# Patient Record
Sex: MALE | URBAN - METROPOLITAN AREA
[De-identification: names, ages, dates, MRNs, and addresses within clinical notes are randomized per-mention and may not be internally consistent; named-entity substitution may affect disease eponyms.]

---

## 2023-07-18 ENCOUNTER — LAB REQUISITION (OUTPATIENT)
Dept: LAB | Facility: CLINIC | Age: 43
End: 2023-07-18

## 2023-07-18 DIAGNOSIS — Z13.1 ENCOUNTER FOR SCREENING FOR DIABETES MELLITUS: ICD-10-CM

## 2023-07-18 DIAGNOSIS — Z13.220 ENCOUNTER FOR SCREENING FOR LIPOID DISORDERS: ICD-10-CM

## 2023-07-18 LAB
ANION GAP SERPL CALCULATED.3IONS-SCNC: 12 MMOL/L (ref 7–15)
BUN SERPL-MCNC: 14.7 MG/DL (ref 6–20)
CALCIUM SERPL-MCNC: 9.7 MG/DL (ref 8.6–10)
CHLORIDE SERPL-SCNC: 104 MMOL/L (ref 98–107)
CHOLEST SERPL-MCNC: 291 MG/DL
CREAT SERPL-MCNC: 0.8 MG/DL (ref 0.67–1.17)
DEPRECATED HCO3 PLAS-SCNC: 25 MMOL/L (ref 22–29)
GFR SERPL CREATININE-BSD FRML MDRD: >90 ML/MIN/1.73M2
GLUCOSE SERPL-MCNC: 86 MG/DL (ref 70–99)
HDLC SERPL-MCNC: 49 MG/DL
LDLC SERPL CALC-MCNC: 209 MG/DL
NONHDLC SERPL-MCNC: 242 MG/DL
POTASSIUM SERPL-SCNC: 4.3 MMOL/L (ref 3.4–5.3)
SODIUM SERPL-SCNC: 141 MMOL/L (ref 136–145)
TRIGL SERPL-MCNC: 165 MG/DL

## 2023-07-18 PROCEDURE — 80048 BASIC METABOLIC PNL TOTAL CA: CPT | Performed by: FAMILY MEDICINE

## 2023-07-18 PROCEDURE — 80061 LIPID PANEL: CPT | Performed by: FAMILY MEDICINE

## 2024-09-23 ENCOUNTER — TRANSFERRED RECORDS (OUTPATIENT)
Dept: HEALTH INFORMATION MANAGEMENT | Facility: CLINIC | Age: 44
End: 2024-09-23

## 2024-09-23 LAB — EJECTION FRACTION: NORMAL %

## 2025-01-06 ENCOUNTER — MEDICAL CORRESPONDENCE (OUTPATIENT)
Dept: HEALTH INFORMATION MANAGEMENT | Facility: CLINIC | Age: 45
End: 2025-01-06
Payer: COMMERCIAL

## 2025-01-06 ENCOUNTER — TRANSFERRED RECORDS (OUTPATIENT)
Dept: HEALTH INFORMATION MANAGEMENT | Facility: CLINIC | Age: 45
End: 2025-01-06
Payer: COMMERCIAL

## 2025-01-09 ENCOUNTER — OFFICE VISIT (OUTPATIENT)
Dept: CARDIOLOGY | Facility: CLINIC | Age: 45
End: 2025-01-09
Payer: COMMERCIAL

## 2025-01-09 VITALS
WEIGHT: 193 LBS | OXYGEN SATURATION: 99 % | HEART RATE: 80 BPM | SYSTOLIC BLOOD PRESSURE: 125 MMHG | DIASTOLIC BLOOD PRESSURE: 82 MMHG

## 2025-01-09 DIAGNOSIS — I34.1 MITRAL VALVE PROLAPSE: Primary | ICD-10-CM

## 2025-01-09 DIAGNOSIS — I34.0 NONRHEUMATIC MITRAL VALVE REGURGITATION: ICD-10-CM

## 2025-01-09 PROCEDURE — 99203 OFFICE O/P NEW LOW 30 MIN: CPT | Performed by: INTERNAL MEDICINE

## 2025-01-09 RX ORDER — DEXTROAMPHETAMINE SACCHARATE, AMPHETAMINE ASPARTATE, DEXTROAMPHETAMINE SULFATE AND AMPHETAMINE SULFATE 3.75; 3.75; 3.75; 3.75 MG/1; MG/1; MG/1; MG/1
0.5 TABLET ORAL
COMMUNITY
Start: 2025-01-06

## 2025-01-09 RX ORDER — DEXTROAMPHETAMINE SACCHARATE, AMPHETAMINE ASPARTATE MONOHYDRATE, DEXTROAMPHETAMINE SULFATE AND AMPHETAMINE SULFATE 2.5; 2.5; 2.5; 2.5 MG/1; MG/1; MG/1; MG/1
10 CAPSULE, EXTENDED RELEASE ORAL EVERY MORNING
COMMUNITY
Start: 2025-01-06

## 2025-01-09 NOTE — PROGRESS NOTES
SUBJECTIVE:  Johnie Buenrostro is a 44 year old male who presents for cardiac evaluation due to a recent echocardiogram showing mitral valve prolapse with moderate to severe mitral regurgitation.  Patient is a  by profession.  According to him his mother told him that he has a cardiac murmur but no evaluation was done till now.  Recently patient's PCP heard a murmur and asked for the echocardiogram.  Currently patient is very active with no cardiac symptoms.  He has no significant comorbidities.  Only other abnormality noticed is normal HDL with over 200 LDL level.  Patient has no other significant cardiac risk factors.  There are no active problems to display for this patient.   .  Current Outpatient Medications   Medication Sig Dispense Refill    amphetamine-dextroamphetamine (ADDERALL XR) 10 MG 24 hr capsule Take 10 mg by mouth every morning.      amphetamine-dextroamphetamine (ADDERALL) 15 MG tablet Take 0.5 tablets by mouth daily at 2 pm.       No current facility-administered medications for this visit.     No past medical history on file.  No past surgical history on file.  Allergies   Allergen Reactions    Penicillins Hives     Social History     Socioeconomic History    Marital status:      Spouse name: Not on file    Number of children: Not on file    Years of education: Not on file    Highest education level: Not on file   Occupational History    Not on file   Tobacco Use    Smoking status: Some Days     Types: Cigars    Smokeless tobacco: Never   Substance and Sexual Activity    Alcohol use: Yes     Alcohol/week: 7.0 standard drinks of alcohol     Types: 7 Standard drinks or equivalent per week    Drug use: Never    Sexual activity: Not on file   Other Topics Concern    Not on file   Social History Narrative    Not on file     Social Drivers of Health     Financial Resource Strain: High Risk (1/1/2022)    Received from [facility]    Financial Resource Strain      Difficulty of Paying Living Expenses: Not on file     Difficulty of Paying Living Expenses: Not on file   Food Insecurity: Not on file   Transportation Needs: Not on file   Physical Activity: Not on file   Stress: Not on file   Social Connections: Unknown (1/1/2022)    Received from [facility] [employer]  & [facility]    Social Connections     Frequency of Communication with Friends and Family: Not on file   Interpersonal Safety: Not on file   Housing Stability: Not on file     No family history on file.       REVIEW OF SYSTEMS:  General: negative, fever, chills, night sweats  Skin: negative, acne, rash, and scaling  Eyes: negative, double vision, eye pain, and photophobia  Ears/Nose/Throat: negative, nasal congestion, and purulent rhinorrhea  Respiratory: No dyspnea on exertion, No cough, No hemoptysis, and negative  Cardiovascular: negative, palpitations, tachycardia, irregular heart beat, chest pain, exertional chest pain or pressure, paroxysmal nocturnal dyspnea, dyspnea on exertion, and orthopnea       OBJECTIVE:  Blood pressure 125/82, pulse 80, weight 87.5 kg (193 lb), SpO2 99%.  General Appearance: healthy, alert, active, and no distress  Head: Normocephalic. No masses, lesions, tenderness or abnormalities  Eyes: conjuctiva clear, PERRL, EOM intact  Ears: External ears normal. Canals clear. TM's normal.  Nose: Nares normal  Mouth: normal  Neck: Supple, no cervical adenopathy, no thyromegaly  Lungs: clear to auscultation  Cardiac: regular rate and rhythm, normal S1 and S2, PSM radiating to axilla.     ASSESSMENT/PLAN:  Patient here for cardiac evaluation due to incidentally detected cardiac murmur and mitral valve prolapse with moderate to severe mitral regurgitation.  Patient is a .  Fairly active with no cardiac symptoms.  To admit to some shortness of breath for past 10 years but it is nonprogressive.  No other symptoms related to valvular heart disease.  According to patient his mother  told him that he has a cardiac murmur during childhood.  No evaluation was done.  Recently PCP heard a murmur and as per the echocardiogram.  This was done at outside hospital and reports suggest moderate to severe mitral regurgitation.  Discussed mitral valve prolapse, implications, indications for intervention and situatios.  They can be a sudden symptom change due to either a ruptured chordae or infection.  As patient is totally asymptomatic we will plan for a ARMANDO.  Patient will be contacted with the test result and further plan.  His LDL is about 200 with normal HDL.  This will be addressed in future.    Total visit duration 30 minutes.  This included face-to-face interview, physical exam, chart review, review of outside echo report and documentation

## 2025-01-09 NOTE — PATIENT INSTRUCTIONS
Thank you for coming to the [facility]; please note the following instructions:    You are scheduled for a Transesophageal Echocardiogram at the [facility] ([address], [facility] [address], [phone]).       Visitor Policy: Two visitors.    Follow these instructions:    1. Report to the GOLD waiting room in the [facility] on: Thursday, January 23rd 10:00 am check in    2. Do not eat for 8 hours prior to arrival, you can drink water up until 2 hours prior.    3. The morning of your procedure you may take your scheduled medications with a SIP of water.     4. You will receive medication that makes you sleepy; you will need a  and someone to stay with you for 6 hours following this procedure.  You should not make any legal decisions for 24 hours following discharge.       If you have further questions, please utilize [employer] to contact us.   If your question concerns the above instructions, contact:  Francheska Trujillo RN  Electrophysiology Nurse Coordinator.  [phone]    If your question concerns the schedule/appointment times, contact:  Celine Sanz  EP   [phone]          If you have any questions regarding your visit please contact your care team:     Cardiology  Telephone Number   Francheska CURRY., RN  Frances TANNER, RN  Lore SHI, RN  Vicky WATSON, RORY MCCAIN, RORY AYOUB, [place]  Chanel TANNER, PRASANTH Barr., [phone] (option 1)   For scheduling appts:     [phone] (select option 1)       For the Device Clinic (Pacemakers and ICD's)  RN's :  Adelaida Malhotra   During business hours: [phone]    *After business hours:  [phone] (select option 4)      Normal test result notifications will be released via [employer] or mailed within 7 business days.  All other test results, will be communicated via telephone once reviewed by your cardiologist.    If you need a medication refill please contact your pharmacy.  Please allow 3 business  days for your refill to be completed.    As always, thank you for trusting us with your health care needs!

## 2025-01-09 NOTE — LETTER
1/9/2025      RE: Johnie Buenrostro  [address] [facility] [address]       Dear Colleague,    Thank you for the opportunity to participate in the care of your patient, Johnie Buenrostro, at the [facility]. Please see a copy of my visit note below.       SUBJECTIVE:  Johnie Buenrostro is a 44 year old male who presents for cardiac evaluation due to a recent echocardiogram showing mitral valve prolapse with moderate to severe mitral regurgitation.  Patient is a  by profession.  According to him his mother told him that he has a cardiac murmur but no evaluation was done till now.  Recently patient's PCP heard a murmur and asked for the echocardiogram.  Currently patient is very active with no cardiac symptoms.  He has no significant comorbidities.  Only other abnormality noticed is normal HDL with over 200 LDL level.  Patient has no other significant cardiac risk factors.  There are no active problems to display for this patient.   .  Current Outpatient Medications   Medication Sig Dispense Refill     amphetamine-dextroamphetamine (ADDERALL XR) 10 MG 24 hr capsule Take 10 mg by mouth every morning.       amphetamine-dextroamphetamine (ADDERALL) 15 MG tablet Take 0.5 tablets by mouth daily at 2 pm.       No current facility-administered medications for this visit.     No past medical history on file.  No past surgical history on file.  Allergies   Allergen Reactions     Penicillins Hives     Social History     Socioeconomic History     Marital status:      Spouse name: Not on file     Number of children: Not on file     Years of education: Not on file     Highest education level: Not on file   Occupational History     Not on file   Tobacco Use     Smoking status: Some Days     Types: Cigars     Smokeless tobacco: Never   Substance and Sexual Activity     Alcohol use: Yes     Alcohol/week: 7.0 standard drinks of alcohol      Types: 7 Standard drinks or equivalent per week     Drug use: Never     Sexual activity: Not on file   Other Topics Concern     Not on file   Social History Narrative     Not on file     Social Drivers of Health     Financial Resource Strain: High Risk (1/1/2022)    Received from [facility]    Financial Resource Strain      Difficulty of Paying Living Expenses: Not on file      Difficulty of Paying Living Expenses: Not on file   Food Insecurity: Not on file   Transportation Needs: Not on file   Physical Activity: Not on file   Stress: Not on file   Social Connections: Unknown (1/1/2022)    Received from [facility]    Social Connections      Frequency of Communication with Friends and Family: Not on file   Interpersonal Safety: Not on file   Housing Stability: Not on file     No family history on file.       REVIEW OF SYSTEMS:  General: negative, fever, chills, night sweats  Skin: negative, acne, rash, and scaling  Eyes: negative, double vision, eye pain, and photophobia  Ears/Nose/Throat: negative, nasal congestion, and purulent rhinorrhea  Respiratory: No dyspnea on exertion, No cough, No hemoptysis, and negative  Cardiovascular: negative, palpitations, tachycardia, irregular heart beat, chest pain, exertional chest pain or pressure, paroxysmal nocturnal dyspnea, dyspnea on exertion, and orthopnea       OBJECTIVE:  Blood pressure 125/82, pulse 80, weight 87.5 kg (193 lb), SpO2 99%.  General Appearance: healthy, alert, active, and no distress  Head: Normocephalic. No masses, lesions, tenderness or abnormalities  Eyes: conjuctiva clear, PERRL, EOM intact  Ears: External ears normal. Canals clear. TM's normal.  Nose: Nares normal  Mouth: normal  Neck: Supple, no cervical adenopathy, no thyromegaly  Lungs: clear to auscultation  Cardiac: regular rate and rhythm, normal S1 and S2, PSM radiating to axilla.     ASSESSMENT/PLAN:  Patient here for cardiac  evaluation due to incidentally detected cardiac murmur and mitral valve prolapse with moderate to severe mitral regurgitation.  Patient is a .  Fairly active with no cardiac symptoms.  To admit to some shortness of breath for past 10 years but it is nonprogressive.  No other symptoms related to valvular heart disease.  According to patient his mother told him that he has a cardiac murmur during childhood.  No evaluation was done.  Recently PCP heard a murmur and as per the echocardiogram.  This was done at outside hospital and reports suggest moderate to severe mitral regurgitation.  Discussed mitral valve prolapse, implications, indications for intervention and situatios.  They can be a sudden symptom change due to either a ruptured chordae or infection.  As patient is totally asymptomatic we will plan for a ARMANDO.  Patient will be contacted with the test result and further plan.  His LDL is about 200 with normal HDL.  This will be addressed in future.    Total visit duration 30 minutes.  This included face-to-face interview, physical exam, chart review, review of outside echo report and documentation      Please do not hesitate to contact me if you have any questions/concerns.     Sincerely,     ROBBY Olguin MD

## 2025-01-19 ENCOUNTER — HEALTH MAINTENANCE LETTER (OUTPATIENT)
Age: 45
End: 2025-01-19

## 2025-01-23 ENCOUNTER — HOSPITAL ENCOUNTER (OUTPATIENT)
Dept: CARDIOLOGY | Facility: CLINIC | Age: 45
End: 2025-01-23
Attending: INTERNAL MEDICINE
Payer: COMMERCIAL

## 2025-01-23 VITALS
DIASTOLIC BLOOD PRESSURE: 80 MMHG | SYSTOLIC BLOOD PRESSURE: 106 MMHG | HEART RATE: 66 BPM | OXYGEN SATURATION: 98 % | RESPIRATION RATE: 11 BRPM

## 2025-01-23 DIAGNOSIS — I34.1 MITRAL VALVE PROLAPSE: ICD-10-CM

## 2025-01-23 LAB — LVEF ECHO: NORMAL

## 2025-01-23 PROCEDURE — 93325 DOPPLER ECHO COLOR FLOW MAPG: CPT

## 2025-01-23 PROCEDURE — 250N000011 HC RX IP 250 OP 636: Performed by: INTERNAL MEDICINE

## 2025-01-23 PROCEDURE — 250N000009 HC RX 250: Performed by: INTERNAL MEDICINE

## 2025-01-23 RX ORDER — LIDOCAINE HYDROCHLORIDE 20 MG/ML
15 SOLUTION OROPHARYNGEAL ONCE
Status: COMPLETED | OUTPATIENT
Start: 2025-01-23 | End: 2025-01-23

## 2025-01-23 RX ORDER — SODIUM CHLORIDE 9 MG/ML
1000 INJECTION, SOLUTION INTRAVENOUS CONTINUOUS
OUTPATIENT
Start: 2025-01-23

## 2025-01-23 RX ORDER — FENTANYL CITRATE 50 UG/ML
50 INJECTION, SOLUTION INTRAMUSCULAR; INTRAVENOUS
Status: ACTIVE | OUTPATIENT
Start: 2025-01-23

## 2025-01-23 RX ORDER — ACYCLOVIR 200 MG/1
9.5 CAPSULE ORAL
Status: ACTIVE | OUTPATIENT
Start: 2025-01-23

## 2025-01-23 RX ORDER — NALOXONE HYDROCHLORIDE 0.4 MG/ML
0.4 INJECTION, SOLUTION INTRAMUSCULAR; INTRAVENOUS; SUBCUTANEOUS
Status: ACTIVE | OUTPATIENT
Start: 2025-01-23 | End: 2025-01-25

## 2025-01-23 RX ORDER — NALOXONE HYDROCHLORIDE 0.4 MG/ML
0.2 INJECTION, SOLUTION INTRAMUSCULAR; INTRAVENOUS; SUBCUTANEOUS
Status: ACTIVE | OUTPATIENT
Start: 2025-01-23 | End: 2025-01-25

## 2025-01-23 RX ORDER — FENTANYL CITRATE 50 UG/ML
25 INJECTION, SOLUTION INTRAMUSCULAR; INTRAVENOUS
Status: ACTIVE | OUTPATIENT
Start: 2025-01-23

## 2025-01-23 RX ORDER — FLUMAZENIL 0.1 MG/ML
0.2 INJECTION, SOLUTION INTRAVENOUS
Status: ACTIVE | OUTPATIENT
Start: 2025-01-23 | End: 2025-01-25

## 2025-01-23 RX ADMIN — MIDAZOLAM 1 MG: 1 INJECTION INTRAMUSCULAR; INTRAVENOUS at 11:09

## 2025-01-23 RX ADMIN — FENTANYL CITRATE 25 MCG: 50 INJECTION, SOLUTION INTRAMUSCULAR; INTRAVENOUS at 10:58

## 2025-01-23 RX ADMIN — MIDAZOLAM 2 MG: 1 INJECTION INTRAMUSCULAR; INTRAVENOUS at 10:58

## 2025-01-23 RX ADMIN — MIDAZOLAM 0.5 MG: 1 INJECTION INTRAMUSCULAR; INTRAVENOUS at 11:06

## 2025-01-23 RX ADMIN — MIDAZOLAM 0.5 MG: 1 INJECTION INTRAMUSCULAR; INTRAVENOUS at 11:20

## 2025-01-23 RX ADMIN — MIDAZOLAM 1 MG: 1 INJECTION INTRAMUSCULAR; INTRAVENOUS at 11:01

## 2025-01-23 RX ADMIN — LIDOCAINE HYDROCHLORIDE 15 ML: 20 SOLUTION ORAL at 10:45

## 2025-01-23 RX ADMIN — MIDAZOLAM 0.5 MG: 1 INJECTION INTRAMUSCULAR; INTRAVENOUS at 11:18

## 2025-01-23 RX ADMIN — FENTANYL CITRATE 25 MCG: 50 INJECTION, SOLUTION INTRAMUSCULAR; INTRAVENOUS at 11:01

## 2025-01-23 RX ADMIN — TOPICAL ANESTHETIC 0.5 ML: 200 SPRAY DENTAL; PERIODONTAL at 10:45

## 2025-01-23 RX ADMIN — MIDAZOLAM 0.5 MG: 1 INJECTION INTRAMUSCULAR; INTRAVENOUS at 11:03

## 2025-01-23 RX ADMIN — FENTANYL CITRATE 25 MCG: 50 INJECTION, SOLUTION INTRAMUSCULAR; INTRAVENOUS at 11:18

## 2025-01-23 RX ADMIN — FENTANYL CITRATE 25 MCG: 50 INJECTION, SOLUTION INTRAMUSCULAR; INTRAVENOUS at 11:06

## 2025-01-23 NOTE — PROGRESS NOTES
Pt arrived in ECHO department for scheduled ARMANDO.   Procedure explained, questions answered and consent signed. Discharge instructions discussed with patient.  Pt's throat sprayed at 1045, therefore pt will not be able to eat or drink until 2 hours after at 1245. Informed pt of this time and encouraged to start with warm fluids and soft foods.    Pt tolerated procedure well, and was given a total of 100 mcg IV fentanyl and 6 mg IV versed for conscious sedation. Pt denied throat or chest pain after ARMANDO complete.   ARMANDO probe 61 used for procedure.  Pt denied chest or throat pain after procedure and was D/C home after awake and VSS. Escorted out to front lobby by staff in w/c to meet pt's ride home.

## 2025-01-23 NOTE — PRE-PROCEDURE
GENERAL PRE-PROCEDURE:     Verbal consent obtained?: Yes    Written consent obtained?: Yes    Risks and benefits: Risks, benefits and alternatives were discussed    DC Plan: Appropriate discharge home plan in place for patients who are going home after procedure   Consent given by:  Patient  Patient states understanding of procedure being performed: Yes    Patient's understanding of procedure matches consent: Yes    Procedure consent matches procedure scheduled: Yes    Appropriately NPO:  Yes  ASA Class:  2  Mallampati  :  Grade 1- soft palate, uvula, tonsillar pillars, and posterior pharyngeal wall visible  Lungs:  Lungs clear with good breath sounds bilaterally  Heart:  Normal heart sounds and rate and systolic murmur  Statement of review:  I have reviewed the lab findings, diagnostic data, medications, and the plan for sedation

## 2025-01-28 ENCOUNTER — TELEPHONE (OUTPATIENT)
Dept: CARDIOLOGY | Facility: CLINIC | Age: 45
End: 2025-01-28
Payer: COMMERCIAL

## 2025-01-28 NOTE — TELEPHONE ENCOUNTER
[facility] Call Center    Phone Message    May a detailed message be left on voicemail: yes     Reason for Call: Other: pt does have some questions about the results of the ARMANDO.  Please call to advise.     Action Taken: Message routed to:  [facility] ([facility] cardio    Travel Screening: Not Applicable    Thank you!  [facility]       Date of Service:

## 2025-01-29 NOTE — TELEPHONE ENCOUNTER
Appointment not needed per Dr. Browne.  Dr. Browne contacted patient via phone and discussed referral to Dr. Maxwell CVTS. Patient is agreeable with the plan.    Francheska Trujillo, RN  Cardiology Care Coordinator  [facility]  [phone] option 1    ROBBY Olguin MD  1/28/2025 12:44 PM CST       Zachariah Cheng,  Here is the result of your transesophageal echocardiogram.  The valve leak appears severe.  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  This is not going to get better by itself.  You will need surgery.  Most likely a minimally invasive surgery which means without opening the whole sternum is possible.  I will be referring to a surgeon who does the surgery and the minimally invasive way.  If you want to discuss further you can come to the clinic.  Thanks FAYE

## 2025-02-06 ENCOUNTER — OFFICE VISIT (OUTPATIENT)
Dept: CARDIOLOGY | Facility: CLINIC | Age: 45
End: 2025-02-06
Attending: SURGERY
Payer: COMMERCIAL

## 2025-02-06 VITALS
WEIGHT: 194.6 LBS | OXYGEN SATURATION: 96 % | SYSTOLIC BLOOD PRESSURE: 128 MMHG | DIASTOLIC BLOOD PRESSURE: 87 MMHG | HEART RATE: 92 BPM

## 2025-02-06 DIAGNOSIS — I34.0 NONRHEUMATIC MITRAL VALVE REGURGITATION: ICD-10-CM

## 2025-02-06 DIAGNOSIS — I34.1 MITRAL VALVE PROLAPSE: Primary | ICD-10-CM

## 2025-02-06 PROCEDURE — 99204 OFFICE O/P NEW MOD 45 MIN: CPT | Performed by: SURGERY

## 2025-02-06 PROCEDURE — 99213 OFFICE O/P EST LOW 20 MIN: CPT | Performed by: SURGERY

## 2025-02-06 RX ORDER — ERGOCALCIFEROL 1.25 MG/1
50000 CAPSULE, LIQUID FILLED ORAL WEEKLY
COMMUNITY

## 2025-02-06 ASSESSMENT — PAIN SCALES - GENERAL: PAINLEVEL_OUTOF10: NO PAIN (0)

## 2025-02-06 NOTE — NURSING NOTE
Chief Complaint   Patient presents with    New Patient     NEW CV SURGERY - MV           Vitals were take, medications reconciled     Rio Marrufo, Clinic Assistant     3:14 PM

## 2025-02-06 NOTE — LETTER
Cardiothoracic Surgery          This patient is scheduled to undergo a heart valve repair or replacement.    Please have the dentist sign the attached form and fax or e-mail it to the Cardiothoracic Surgery office prior to their surgery date.    Primary fax number:  [facility] ([facility]) [phone]    E-mail:   [facility] ([facility]): [email or web address]  (Sharda)      Please contact Sharda Youssef RN at [phone].      Thank you,  Cardiothoracic Surgery Office                                    PATIENT:   BATSHEVA VERDUZCO    :   1980       DATE OF DENTAL VISIT: __________________                                                    ___________________________   was seen by me today for dental clearance prior to heart valve surgery.     ______  No additional treatment is needed prior to surgery.    ______  Further treatment is needed and will be completed prior to surgery date.    ______  Surgery date may need to be rescheduled due to this condition     _________________________________________________________.    Dr. _______________________    Phone _______________________.      Clinic name ____________________________________________________      Address_______________________________________________________

## 2025-02-06 NOTE — LETTER
2/6/2025      RE: Johnie Buenrostro  [address] [facility] [place] [address]       Dear Colleague,    Thank you for the opportunity to participate in the care of your patient, Johnie Buenrostro, at the [facility] [place] [facility]. Please see a copy of my visit note below.    Cardiovascular and Thoracic Surgery Clinic Consultation    Johnie Buenrostro MRN# [id number]   YOB: 1980 Age: 44 year old        Reason for consult: I was asked by Dr. Browne to evaluate this patient for minimally invasive mitral valve repair.           Assessment and Plan:   Mr. 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 His LV function is preserved. My recommendation is a minimally invasive mitral valve repair.    I went over the diagnosis in detail as well as the ARMANDO findings, and the reason for recommending the mitral valve repair. I went over the risks and benefits of the operation as well, and the possible complications associated with the surgery. These complications include, but are not strictly limited to, infection, bleeding, stroke, postop MI, postop arrhythmias, pulmonary and renal complications. I think he is an overall excellent surgical candidate, and I quoted an operative mortality risk of 1% or less. I also told him that the valve is highly likely repairable, but in the event that the repair is not feasible, we will replace it with a mechanical valve. This would require him to be on lifelong anticoagulation with Coumadin. He understands and agrees to proceed. We will get a preoerative LHC, RHC, and MICS protocol CTA and hope to schedule him for a right mini-thoracotomy mitral valve repair in the near future.      It was a pleasure to meet Mr. Buenrostro today, and thank you very much for this referral.        Attestation:  Debbie Maxwell MD           Chief Complaint:   Occasional mild dyspnea on exertion           Review of Systems:   Mr. Buenrostro is a very pleasant otherwise healthy 44 year old  who was recently found to have severe mitral valve regurgitation. He was seen by Dr. Browne who referred the patient to me for a surgical evaluation for possible mitral valve repair.             Past Medical History:   No past medical history on file.          Past Surgical History:   No past surgical history on file.            Social History:     Social History     Tobacco Use     Smoking status: Some Days     Types: Cigars     Smokeless tobacco: Never   Substance Use Topics     Alcohol use: Yes     Alcohol/week: 7.0 standard drinks of alcohol     Types: 7 Standard drinks or equivalent per week             Family History:   No family history on file.          Immunizations:     Immunization History   Administered Date(s) Administered     COVID-19 MONOVALENT 12+ (Pfizer) 04/24/2021, 05/13/2021, 12/20/2021             Allergies:     Allergies   Allergen Reactions     Penicillins Hives             Medications:     Current Outpatient Medications   Medication Sig Dispense Refill     amphetamine-dextroamphetamine (ADDERALL XR) 10 MG 24 hr capsule Take 10 mg by mouth every morning.       amphetamine-dextroamphetamine (ADDERALL) 15 MG tablet Take 0.5 tablets by mouth daily at 2 pm.       vitamin D2 (ERGOCALCIFEROL) 24229 units (1250 mcg) capsule Take 50,000 Units by mouth once a week.       No current facility-administered medications for this visit.             Review of Systems:   The 10 point Review of Systems is negative other than noted in the HPI         Physical Examination:   AVSS    General: pleasant, in no acute distress  CV: RRR, normal S1 and S2, grade 3/6 systolic murmur at the apex  Resp: CTAB  Abd: soft, ND  Ext: no  edema  Neuro: no focal deficits            Data:     Lab Results   Component Value Date     2023    POTASSIUM 4.3 2023    CHLORIDE 104 2023    CO2 25 2023    BUN 14.7 2023    CR 0.80 2023    GLC 86 2023        ARMANDO 25:  Results  Transesophageal Echocardiogram (Order [id number])  External Result Report    External Result Report     PACS Images     Show images for Transesophageal Echocardiogram  Transesophageal Echocardiogram  Order: [id number]  Status: Edited Result - FINAL       Visible to patient: Yes (seen)       Next appt: 2025 at 10:00 AM in Radiology. ([facility] ROOM 4)       Dx: Mitral valve prolapse    3 Result Notes       1 Patient Communication  Details    Reading Physician Reading Date Result Priority   Tom Tian MD  [phone]  [phone] 2025      Result Text  [id number]  [id number]  [id number]  [id number]^ANA^ROBBY^EYAD     [facility],[place]  Echocardiography Laboratory  [address] [place] [address]     Name: BATSHEVA VERDUZCO  MRN: [id number]  : 1980  Study Date: 2025 10:57 AM  Age: 44 yrs  Gender: Male  Patient Location: [facility]  Reason For Study: Mitral valve prolapse  Ordering Physician: ROBBY PEREZ  Referring Physician: ROBBY PEREZ  Performed By: Valente Wynne     Attestation  I was present during ARMANDO probe placement by the fellow, Valente Wynne. I  personally viewed the imaging and agree with the interpretation and report as  documented by the fellow.  ______________________________________________________________________________  Interpretation Summary  The visual ejection fraction is 55-60%. No regional wall motion abnormalities  are seen.  The right ventricle is normal in size and function.     Severe mitral insufficiency(ERO 0.54, RVol of 83cc) in the setting of mymatous  degeneration with bileaflet prolapse and flail P2. Cleft also noted  between P2  and P1 scallops.     The left atrial appendage is normal. It is free of spontaneous echo contrast  and thrombus.  The atrial septum is intact as assessed by color Doppler .     No pericardial effusion.     Previous study not available for comparison.  ______________________________________________________________________________  Procedure  Transesophageal Echocardiogram with two-dimensional, color and spectral  Doppler. Procedure location Echo Lab. The procedure was performed in the Echo  Lab. Informed consent for Transesophegeal echo obtained. ARMANDO Probe #61 was  used during the procedure. Patient was sedated using Fentanyl 100 mcg. Patient  was sedated using Versed 6 mg. The heart rate, respiratory rate, oxygen  saturations, blood pressure, and response to care were monitored throughout  the procedure with the assistance of the nurse. I determined this patient to  be an appropriate candidate for the planned sedation and procedure and have  reassessed the patient immediately prior to sedation and procedure. Total  sedation time: 45 minutes of continuous bedside 1:1 monitoring. The Transducer  was inserted without difficulty . The patient tolerated the procedure well.  Complications None. ECG shows normal sinus rhythm.     Left Ventricle  The visual ejection fraction is 55-60%. The Ejection Fraction was visually  estimated. No regional wall motion abnormalities are seen.     Right Ventricle  The right ventricle is normal size. Global right ventricular function is  normal.     Atria  Both atria appear normal. The left atrial appendage is normal. It is free of  spontaneous echo contrast and thrombus. The atrial septum is intact as  assessed by color Doppler .     Mitral Valve  Anterior mitral leaflet length, 32mm. Posterior mitral leaflet length, 22mm.  Mitral leaflet thickness is increased . Mymatous degeneration with bileaflet  prolapse and flail P2. Cleft also noted between P2 and P1. Severe  mitral  insufficiency is present. ERO 0.54, RVol of 83cc.     Aortic Valve  Aortic valve is normal in structure and function. The aortic valve is  tricuspid.     Tricuspid Valve  The tricuspid valve is normal. Trace tricuspid insufficiency is present.     Pulmonic Valve  Trace pulmonic insufficiency is present. Systolic flow reversal.     Vessels  The aorta root is normal. Ascending aorta 3.3 cm.     Pericardium  No pericardial effusion is present.     Compared to Previous Study  Previous study not available for comparison.     ______________________________________________________________________________  Doppler Measurements & Calculations  MV max P.7 mmHg  MV mean P.0 mmHg  MV V2 VTI: 153.0 cm     MR PISA: 9.0 cm2  MR ERO: 0.56 cm2  MR volume: 87.2 ml     ______________________________________________________________________________  Report approved by: Tom Carreno MD on 2025 02:14 PM       Please do not hesitate to contact me if you have any questions/concerns.     Sincerely,     Debbie Maxwell MD

## 2025-02-06 NOTE — PATIENT INSTRUCTIONS
You were seen today in the [facility] Cardiothoracic Surgery Clinic    Preop testing needed:    PAC (anesthesia) appointment  Blood work  Dental clearance  Coronary angiogram  CT MICS protocol    Please call YANG Robin surgery coordinator with any questions.  Thank you.    Sharda Youssef RNCC  Cardiothoracic Surgery  Phone [phone]  Fax [phone]

## 2025-02-08 NOTE — PROGRESS NOTES
Cardiovascular and Thoracic Surgery Clinic Consultation    Johnie Buenrostro MRN# [id number]   YOB: 1980 Age: 44 year old        Reason for consult: I was asked by Dr. Browne to evaluate this patient for minimally invasive mitral valve repair.           Assessment and Plan:   Mr. 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 His LV function is preserved. My recommendation is a minimally invasive mitral valve repair.    I went over the diagnosis in detail as well as the ARMANDO findings, and the reason for recommending the mitral valve repair. I went over the risks and benefits of the operation as well, and the possible complications associated with the surgery. These complications include, but are not strictly limited to, infection, bleeding, stroke, postop MI, postop arrhythmias, pulmonary and renal complications. I think he is an overall excellent surgical candidate, and I quoted an operative mortality risk of 1% or less. I also told him that the valve is highly likely repairable, but in the event that the repair is not feasible, we will replace it with a mechanical valve. This would require him to be on lifelong anticoagulation with Coumadin. He understands and agrees to proceed. We will get a preoerative LHC, RHC, and MICS protocol CTA and hope to schedule him for a right mini-thoracotomy mitral valve repair in the near future.     It was a pleasure to meet Mr. Buenrostro today, and thank you very much for this referral.        Attestation:  Debbie Maxwell MD           Chief Complaint:   Occasional mild dyspnea on exertion           Review of Systems:   Mr. Buenrostro is a very pleasant otherwise healthy 44 year old  who was recently found to have severe  mitral valve regurgitation. He was seen by Dr. Browne who referred the patient to me for a surgical evaluation for possible mitral valve repair.             Past Medical History:   No past medical history on file.          Past Surgical History:   No past surgical history on file.            Social History:     Social History     Tobacco Use    Smoking status: Some Days     Types: Cigars    Smokeless tobacco: Never   Substance Use Topics    Alcohol use: Yes     Alcohol/week: 7.0 standard drinks of alcohol     Types: 7 Standard drinks or equivalent per week             Family History:   No family history on file.          Immunizations:     Immunization History   Administered Date(s) Administered    COVID-19 MONOVALENT 12+ (Pfizer) 04/24/2021, 05/13/2021, 12/20/2021             Allergies:     Allergies   Allergen Reactions    Penicillins Hives             Medications:     Current Outpatient Medications   Medication Sig Dispense Refill    amphetamine-dextroamphetamine (ADDERALL XR) 10 MG 24 hr capsule Take 10 mg by mouth every morning.      amphetamine-dextroamphetamine (ADDERALL) 15 MG tablet Take 0.5 tablets by mouth daily at 2 pm.      vitamin D2 (ERGOCALCIFEROL) 27951 units (1250 mcg) capsule Take 50,000 Units by mouth once a week.       No current facility-administered medications for this visit.             Review of Systems:   The 10 point Review of Systems is negative other than noted in the HPI         Physical Examination:   AVSS    General: pleasant, in no acute distress  CV: RRR, normal S1 and S2, grade 3/6 systolic murmur at the apex  Resp: CTAB  Abd: soft, ND  Ext: no edema  Neuro: no focal deficits            Data:     Lab Results   Component Value Date     07/18/2023    POTASSIUM 4.3 07/18/2023    CHLORIDE 104 07/18/2023    CO2 25 07/18/2023    BUN 14.7 07/18/2023    CR 0.80 07/18/2023    GLC 86 07/18/2023        ARMANDO 1/23/25:  Results  Transesophageal Echocardiogram (Order [id number])  External  Result Report    External Result Report     PACS Images     Show images for Transesophageal Echocardiogram  Transesophageal Echocardiogram  Order: [id number]  Status: Edited Result - FINAL       Visible to patient: Yes (seen)       Next appt: 2025 at 10:00 AM in Radiology. ([facility] ROOM 4)       Dx: Mitral valve prolapse    3 Result Notes       1 Patient Communication  Details    Reading Physician Reading Date Result Priority   Tom Tian MD  [phone]  [phone] 2025      Result Text  [id number]  [id number]  [id number]  [id number]^ANA^K^P     [facility],[place]  Echocardiography Laboratory  [address] [place] [address]     Name: BATSHEVA VERDUZCO  MRN: [id number]  : 1980  Study Date: 2025 10:57 AM  Age: 44 yrs  Gender: Male  Patient Location: [facility]  Reason For Study: Mitral valve prolapse  Ordering Physician: ROBBY PEREZ  Referring Physician: ROBBY PEREZ  Performed By: Valente Wynne     Attestation  I was present during ARMANDO probe placement by the fellow, Valente Wynne. I  personally viewed the imaging and agree with the interpretation and report as  documented by the fellow.  ______________________________________________________________________________  Interpretation Summary  The visual ejection fraction is 55-60%. No regional wall motion abnormalities  are seen.  The right ventricle is normal in size and function.     Severe mitral insufficiency(ERO 0.54, RVol of 83cc) in the setting of mymatous  degeneration with bileaflet prolapse and flail P2. Cleft also noted between P2  and P1 scallops.     The left atrial appendage is normal. It is free of spontaneous echo contrast  and thrombus.  The atrial septum is intact as assessed by color Doppler .     No pericardial effusion.     Previous study not available for  comparison.  ______________________________________________________________________________  Procedure  Transesophageal Echocardiogram with two-dimensional, color and spectral  Doppler. Procedure location Echo Lab. The procedure was performed in the Echo  Lab. Informed consent for Transesophegeal echo obtained. ARMANDO Probe #61 was  used during the procedure. Patient was sedated using Fentanyl 100 mcg. Patient  was sedated using Versed 6 mg. The heart rate, respiratory rate, oxygen  saturations, blood pressure, and response to care were monitored throughout  the procedure with the assistance of the nurse. I determined this patient to  be an appropriate candidate for the planned sedation and procedure and have  reassessed the patient immediately prior to sedation and procedure. Total  sedation time: 45 minutes of continuous bedside 1:1 monitoring. The Transducer  was inserted without difficulty . The patient tolerated the procedure well.  Complications None. ECG shows normal sinus rhythm.     Left Ventricle  The visual ejection fraction is 55-60%. The Ejection Fraction was visually  estimated. No regional wall motion abnormalities are seen.     Right Ventricle  The right ventricle is normal size. Global right ventricular function is  normal.     Atria  Both atria appear normal. The left atrial appendage is normal. It is free of  spontaneous echo contrast and thrombus. The atrial septum is intact as  assessed by color Doppler .     Mitral Valve  Anterior mitral leaflet length, 32mm. Posterior mitral leaflet length, 22mm.  Mitral leaflet thickness is increased . Mymatous degeneration with bileaflet  prolapse and flail P2. Cleft also noted between P2 and P1. Severe mitral  insufficiency is present. ERO 0.54, RVol of 83cc.     Aortic Valve  Aortic valve is normal in structure and function. The aortic valve is  tricuspid.     Tricuspid Valve  The tricuspid valve is normal. Trace tricuspid insufficiency is present.      Pulmonic Valve  Trace pulmonic insufficiency is present. Systolic flow reversal.     Vessels  The aorta root is normal. Ascending aorta 3.3 cm.     Pericardium  No pericardial effusion is present.     Compared to Previous Study  Previous study not available for comparison.     ______________________________________________________________________________  Doppler Measurements & Calculations  MV max P.7 mmHg  MV mean P.0 mmHg  MV V2 VTI: 153.0 cm     MR PISA: 9.0 cm2  MR ERO: 0.56 cm2  MR volume: 87.2 ml     ______________________________________________________________________________  Report approved by: Tom Carreno MD on 2025 02:14 PM

## 2025-02-10 DIAGNOSIS — I34.0 NONRHEUMATIC MITRAL VALVE REGURGITATION: Primary | ICD-10-CM

## 2025-02-10 RX ORDER — ACETAMINOPHEN 325 MG/1
975 TABLET ORAL ONCE
OUTPATIENT
Start: 2025-02-10 | End: 2025-02-10

## 2025-02-10 RX ORDER — CHLORHEXIDINE GLUCONATE ORAL RINSE 1.2 MG/ML
10 SOLUTION DENTAL ONCE
OUTPATIENT
Start: 2025-02-10 | End: 2025-02-10

## 2025-02-10 RX ORDER — CLINDAMYCIN PHOSPHATE 900 MG/50ML
900 INJECTION, SOLUTION INTRAVENOUS SEE ADMIN INSTRUCTIONS
OUTPATIENT
Start: 2025-02-10

## 2025-02-10 RX ORDER — GABAPENTIN 300 MG/1
300 CAPSULE ORAL
OUTPATIENT
Start: 2025-02-10

## 2025-02-10 RX ORDER — LIDOCAINE 40 MG/G
CREAM TOPICAL
OUTPATIENT
Start: 2025-02-10

## 2025-02-10 RX ORDER — DEXMEDETOMIDINE HYDROCHLORIDE 4 UG/ML
0.2-1.2 INJECTION, SOLUTION INTRAVENOUS CONTINUOUS
OUTPATIENT
Start: 2025-02-10

## 2025-02-10 RX ORDER — FAMOTIDINE 20 MG/1
20 TABLET, FILM COATED ORAL
OUTPATIENT
Start: 2025-02-10

## 2025-02-10 RX ORDER — CLINDAMYCIN PHOSPHATE 900 MG/50ML
900 INJECTION, SOLUTION INTRAVENOUS
OUTPATIENT
Start: 2025-02-10

## 2025-02-10 NOTE — TELEPHONE ENCOUNTER
FUTURE VISIT INFORMATION      SURGERY INFORMATION:  Date: 3/14/25  Location:  HEART CARDIAC CATH LAB   Surgeon:  Cain Juarez MD   Anesthesia Type:  Moderate Sedation   Procedure: Coronary Angiogram Left Heart Catheterization Right Heart Catheterization     RECORDS REQUESTED FROM:       Primary Care Provider: Kaushik Sun MD     Most recent Coronary Angiogram: 3/14/25

## 2025-03-06 ENCOUNTER — TELEPHONE (OUTPATIENT)
Dept: CARDIOLOGY | Facility: CLINIC | Age: 45
End: 2025-03-06
Payer: COMMERCIAL

## 2025-03-06 LAB
ABO + RH BLD: NORMAL
BLD GP AB SCN SERPL QL: NEGATIVE
SPECIMEN EXP DATE BLD: NORMAL

## 2025-03-06 NOTE — TELEPHONE ENCOUNTER
Pt needed to rescheduled CT due to change in ins. Spoke with pt and wife and rescheduled to 3/18. Pt and wife agreed to the plan

## 2025-03-07 ENCOUNTER — PRE VISIT (OUTPATIENT)
Dept: SURGERY | Facility: CLINIC | Age: 45
End: 2025-03-07

## 2025-03-07 ENCOUNTER — ANESTHESIA EVENT (OUTPATIENT)
Dept: SURGERY | Facility: CLINIC | Age: 45
End: 2025-03-07
Payer: COMMERCIAL

## 2025-03-07 ENCOUNTER — LAB (OUTPATIENT)
Dept: LAB | Facility: CLINIC | Age: 45
End: 2025-03-07
Payer: COMMERCIAL

## 2025-03-07 DIAGNOSIS — I34.1 MITRAL VALVE PROLAPSE: ICD-10-CM

## 2025-03-07 DIAGNOSIS — I34.0 NONRHEUMATIC MITRAL VALVE REGURGITATION: ICD-10-CM

## 2025-03-07 LAB
ALBUMIN SERPL BCG-MCNC: 4.5 G/DL (ref 3.5–5.2)
ALBUMIN UR-MCNC: NEGATIVE MG/DL
ALP SERPL-CCNC: 59 U/L (ref 40–150)
ALT SERPL W P-5'-P-CCNC: 22 U/L (ref 0–70)
ANION GAP SERPL CALCULATED.3IONS-SCNC: 9 MMOL/L (ref 7–15)
APPEARANCE UR: CLEAR
APTT PPP: 28 SECONDS (ref 22–38)
AST SERPL W P-5'-P-CCNC: 21 U/L (ref 0–45)
BILIRUB SERPL-MCNC: 1.2 MG/DL
BILIRUB UR QL STRIP: NEGATIVE
BUN SERPL-MCNC: 14.6 MG/DL (ref 6–20)
CALCIUM SERPL-MCNC: 9.5 MG/DL (ref 8.8–10.4)
CHLORIDE SERPL-SCNC: 103 MMOL/L (ref 98–107)
COLOR UR AUTO: NORMAL
CREAT SERPL-MCNC: 0.83 MG/DL (ref 0.67–1.17)
EGFRCR SERPLBLD CKD-EPI 2021: >90 ML/MIN/1.73M2
ERYTHROCYTE [DISTWIDTH] IN BLOOD BY AUTOMATED COUNT: 12.8 % (ref 10–15)
EST. AVERAGE GLUCOSE BLD GHB EST-MCNC: 100 MG/DL
GLUCOSE SERPL-MCNC: 90 MG/DL (ref 70–99)
GLUCOSE UR STRIP-MCNC: NEGATIVE MG/DL
HBA1C MFR BLD: 5.1 %
HCO3 SERPL-SCNC: 27 MMOL/L (ref 22–29)
HCT VFR BLD AUTO: 45 % (ref 40–53)
HGB BLD-MCNC: 15 G/DL (ref 13.3–17.7)
HGB UR QL STRIP: NEGATIVE
INR PPP: 0.99 (ref 0.85–1.15)
KETONES UR STRIP-MCNC: NEGATIVE MG/DL
LEUKOCYTE ESTERASE UR QL STRIP: NEGATIVE
MAGNESIUM SERPL-MCNC: 2.2 MG/DL (ref 1.7–2.3)
MCH RBC QN AUTO: 30.6 PG (ref 26.5–33)
MCHC RBC AUTO-ENTMCNC: 33.3 G/DL (ref 31.5–36.5)
MCV RBC AUTO: 92 FL (ref 78–100)
NITRATE UR QL: NEGATIVE
PH UR STRIP: 7 [PH] (ref 5–7)
PLATELET # BLD AUTO: 245 10E3/UL (ref 150–450)
POTASSIUM SERPL-SCNC: 4.4 MMOL/L (ref 3.4–5.3)
PREALB SERPL-MCNC: 30.5 MG/DL (ref 20–40)
PROT SERPL-MCNC: 7.4 G/DL (ref 6.4–8.3)
RBC # BLD AUTO: 4.9 10E6/UL (ref 4.4–5.9)
SODIUM SERPL-SCNC: 139 MMOL/L (ref 135–145)
SP GR UR STRIP: 1.01 (ref 1–1.03)
UROBILINOGEN UR STRIP-MCNC: NORMAL MG/DL
WBC # BLD AUTO: 4.5 10E3/UL (ref 4–11)

## 2025-03-07 PROCEDURE — 83036 HEMOGLOBIN GLYCOSYLATED A1C: CPT | Performed by: SURGERY

## 2025-03-07 PROCEDURE — 86850 RBC ANTIBODY SCREEN: CPT

## 2025-03-07 PROCEDURE — 85027 COMPLETE CBC AUTOMATED: CPT | Performed by: PATHOLOGY

## 2025-03-07 PROCEDURE — 80053 COMPREHEN METABOLIC PANEL: CPT | Performed by: PATHOLOGY

## 2025-03-07 PROCEDURE — 83735 ASSAY OF MAGNESIUM: CPT | Performed by: PATHOLOGY

## 2025-03-07 PROCEDURE — 85610 PROTHROMBIN TIME: CPT | Performed by: PATHOLOGY

## 2025-03-07 PROCEDURE — 85730 THROMBOPLASTIN TIME PARTIAL: CPT | Performed by: PATHOLOGY

## 2025-03-07 PROCEDURE — 86900 BLOOD TYPING SEROLOGIC ABO: CPT

## 2025-03-07 PROCEDURE — 99000 SPECIMEN HANDLING OFFICE-LAB: CPT | Performed by: PATHOLOGY

## 2025-03-07 PROCEDURE — 84134 ASSAY OF PREALBUMIN: CPT | Performed by: SURGERY

## 2025-03-07 PROCEDURE — 36415 COLL VENOUS BLD VENIPUNCTURE: CPT | Performed by: PATHOLOGY

## 2025-03-07 PROCEDURE — 81003 URINALYSIS AUTO W/O SCOPE: CPT | Performed by: PATHOLOGY

## 2025-03-10 ENCOUNTER — TELEPHONE (OUTPATIENT)
Dept: CARDIOLOGY | Facility: CLINIC | Age: 45
End: 2025-03-10
Payer: COMMERCIAL

## 2025-03-10 NOTE — TELEPHONE ENCOUNTER
Pre-procedure instructions - Coronary Angiogram  Patient Education     Your arrival time is 10:30 AM on Friday, March 14.  Location is [address] Waiting Room  Please plan on being at the hospital all day. If you are on dialysis, DO NOT schedule on a dialysis day.  At any time, emergencies and/or urgent cases may come up which could delay the start of your procedure.     Pre-procedure instructions - Coronary Angiogram  Shower in the evening before or the morning of the procedure  No solid food for 8 hours prior and nothing to drink 2 hours prior to arrival time  You can take your morning medications (except for diabetic and blood thinners) with sips of water.  Take 325 mg of Aspirin the night before and the morning of your procedure.  You will need to arrange a ride to drop you off and , as you will be unable to drive home. Prior to discharge you may be required to lay flat for approximately 2-6 hours in the recovery unit to ensure proper clotting of the artery. Please note: You cannot take an Uber/Taxi/etc unless you are accompanied by someone.You will need a responsible adult to stay with you for 24 hours post-procedure.               Diabetic Medication Instructions  Hold oral diabetic medication in morning of your procedure and for 48 hours after IV contrast is given  Typical instructions for insulin diabetic medication holding are below. However, please reach out to your Primary Care Provider or Endocrinologist for specific instructions  DO NOT take any oral diabetic medication, short-acting diabetes medications/insulin, humalog or regular insulin the morning of your test  Take   dose of long-acting insulin (Lantus, Levemir) the day of your test  Remember to bring your glucometer and insulin with you to take after your test if needed  GLP-1 Agonists Instructions  DO NOT take injectable GLP-1 agonists semaglutide  (Ozempic, Wegovy), dulaglutide (Trulicity), exenatide ER (Bydureon), tirzepatide (Mounjaro), or oral semaglutide (Rybelsus) for 7 days prior your procedure  Hold once daily injectable GLP-1 agonists exenatide (Byetta), liraglutide (Saxenda, Victoza), lixisenatide (Soligua) the day before and day of your procedure               Anticoagulation Medication Instructions   NA      Please call Sharda if you have any questions.     Sharda Youssef  Cardiovascular Surgery RN care coordinator  [phone]      ----- Message from Sharda MONIQUE sent at 2/10/2025  8:29 AM CST -----  Review instructions for CATH 3/14  ----- Message -----  From: Vashti Fields  Sent: 2/7/2025   4:10 PM CST  To: Sharda Youssef RN      He is in for the CT MICs at [facility]. PAC and lab at the [facility] on 3/7  Cath on 3/14  ----- Message -----  From: Marta Alvares  Sent: 2/7/2025  12:53 PM CST  To: Vashti Fields; YANG Plata!    I talked to him and scheduled surgery for 3/26    Thanks!  ----- Message -----  From: Sharda Youssef RN  Sent: 2/7/2025   8:00 AM CST  To: Marta Vergara friends,    Pt needs to schedule with RKV at the : minimally invasive Mitral valve repair    He also needs:  PAC  Labs  CT MICS  Cath    Please call him to schedule, thank you!  Mendy

## 2025-03-14 ENCOUNTER — APPOINTMENT (OUTPATIENT)
Dept: MEDSURG UNIT | Facility: CLINIC | Age: 45
End: 2025-03-14
Attending: INTERNAL MEDICINE
Payer: COMMERCIAL

## 2025-03-14 ENCOUNTER — APPOINTMENT (OUTPATIENT)
Dept: LAB | Facility: CLINIC | Age: 45
End: 2025-03-14
Attending: INTERNAL MEDICINE
Payer: COMMERCIAL

## 2025-03-14 ENCOUNTER — HOSPITAL ENCOUNTER (OUTPATIENT)
Facility: CLINIC | Age: 45
Discharge: HOME | End: 2025-03-14
Attending: INTERNAL MEDICINE | Admitting: INTERNAL MEDICINE
Payer: COMMERCIAL

## 2025-03-14 VITALS
OXYGEN SATURATION: 96 % | WEIGHT: 190.7 LBS | RESPIRATION RATE: 14 BRPM | DIASTOLIC BLOOD PRESSURE: 77 MMHG | HEIGHT: 72 IN | HEART RATE: 86 BPM | TEMPERATURE: 98.2 F | SYSTOLIC BLOOD PRESSURE: 118 MMHG | BODY MASS INDEX: 25.83 KG/M2

## 2025-03-14 DIAGNOSIS — I34.0 NONRHEUMATIC MITRAL VALVE REGURGITATION: ICD-10-CM

## 2025-03-14 DIAGNOSIS — I34.1 MITRAL VALVE PROLAPSE: ICD-10-CM

## 2025-03-14 PROBLEM — Z98.890 STATUS POST CORONARY ANGIOGRAM: Status: ACTIVE | Noted: 2025-03-14

## 2025-03-14 LAB
ANION GAP SERPL CALCULATED.3IONS-SCNC: 13 MMOL/L (ref 7–15)
APTT PPP: 28 SECONDS (ref 22–38)
BUN SERPL-MCNC: 14.3 MG/DL (ref 6–20)
CALCIUM SERPL-MCNC: 9.5 MG/DL (ref 8.8–10.4)
CHLORIDE SERPL-SCNC: 103 MMOL/L (ref 98–107)
CREAT SERPL-MCNC: 0.81 MG/DL (ref 0.67–1.17)
EGFRCR SERPLBLD CKD-EPI 2021: >90 ML/MIN/1.73M2
ERYTHROCYTE [DISTWIDTH] IN BLOOD BY AUTOMATED COUNT: 12.7 % (ref 10–15)
GLUCOSE SERPL-MCNC: 89 MG/DL (ref 70–99)
HCO3 SERPL-SCNC: 23 MMOL/L (ref 22–29)
HCT VFR BLD AUTO: 43.8 % (ref 40–53)
HGB BLD-MCNC: 14.9 G/DL (ref 13.3–17.7)
INR PPP: 1.04 (ref 0.85–1.15)
MCH RBC QN AUTO: 31.2 PG (ref 26.5–33)
MCHC RBC AUTO-ENTMCNC: 34 G/DL (ref 31.5–36.5)
MCV RBC AUTO: 92 FL (ref 78–100)
PLATELET # BLD AUTO: 252 10E3/UL (ref 150–450)
POTASSIUM SERPL-SCNC: 4.3 MMOL/L (ref 3.4–5.3)
RBC # BLD AUTO: 4.78 10E6/UL (ref 4.4–5.9)
SODIUM SERPL-SCNC: 139 MMOL/L (ref 135–145)
WBC # BLD AUTO: 6.6 10E3/UL (ref 4–11)

## 2025-03-14 PROCEDURE — 250N000011 HC RX IP 250 OP 636: Performed by: INTERNAL MEDICINE

## 2025-03-14 PROCEDURE — 999N000134 HC STATISTIC PP CARE STAGE 3

## 2025-03-14 PROCEDURE — 250N000013 HC RX MED GY IP 250 OP 250 PS 637: Performed by: STUDENT IN AN ORGANIZED HEALTH CARE EDUCATION/TRAINING PROGRAM

## 2025-03-14 PROCEDURE — 272N000001 HC OR GENERAL SUPPLY STERILE: Performed by: INTERNAL MEDICINE

## 2025-03-14 PROCEDURE — 999N000054 HC STATISTIC EKG NON-CHARGEABLE

## 2025-03-14 PROCEDURE — 93456 R HRT CORONARY ARTERY ANGIO: CPT | Mod: 26 | Performed by: INTERNAL MEDICINE

## 2025-03-14 PROCEDURE — 250N000009 HC RX 250: Performed by: INTERNAL MEDICINE

## 2025-03-14 PROCEDURE — 250N000013 HC RX MED GY IP 250 OP 250 PS 637: Performed by: NURSE PRACTITIONER

## 2025-03-14 PROCEDURE — 99152 MOD SED SAME PHYS/QHP 5/>YRS: CPT | Performed by: INTERNAL MEDICINE

## 2025-03-14 PROCEDURE — 85014 HEMATOCRIT: CPT | Performed by: NURSE PRACTITIONER

## 2025-03-14 PROCEDURE — 85610 PROTHROMBIN TIME: CPT | Performed by: NURSE PRACTITIONER

## 2025-03-14 PROCEDURE — 80048 BASIC METABOLIC PNL TOTAL CA: CPT | Performed by: NURSE PRACTITIONER

## 2025-03-14 PROCEDURE — 93456 R HRT CORONARY ARTERY ANGIO: CPT | Performed by: INTERNAL MEDICINE

## 2025-03-14 PROCEDURE — 36415 COLL VENOUS BLD VENIPUNCTURE: CPT | Performed by: NURSE PRACTITIONER

## 2025-03-14 PROCEDURE — 93005 ELECTROCARDIOGRAM TRACING: CPT

## 2025-03-14 PROCEDURE — C1894 INTRO/SHEATH, NON-LASER: HCPCS | Performed by: INTERNAL MEDICINE

## 2025-03-14 PROCEDURE — C1726 CATH, BAL DIL, NON-VASCULAR: HCPCS | Performed by: INTERNAL MEDICINE

## 2025-03-14 PROCEDURE — 99152 MOD SED SAME PHYS/QHP 5/>YRS: CPT | Mod: GC | Performed by: INTERNAL MEDICINE

## 2025-03-14 PROCEDURE — 999N000142 HC STATISTIC PROCEDURE PREP ONLY

## 2025-03-14 PROCEDURE — 258N000003 HC RX IP 258 OP 636: Performed by: NURSE PRACTITIONER

## 2025-03-14 PROCEDURE — 93010 ELECTROCARDIOGRAM REPORT: CPT | Performed by: INTERNAL MEDICINE

## 2025-03-14 PROCEDURE — 99153 MOD SED SAME PHYS/QHP EA: CPT | Performed by: INTERNAL MEDICINE

## 2025-03-14 PROCEDURE — 250N000009 HC RX 250: Performed by: NURSE PRACTITIONER

## 2025-03-14 PROCEDURE — 85730 THROMBOPLASTIN TIME PARTIAL: CPT | Performed by: NURSE PRACTITIONER

## 2025-03-14 PROCEDURE — 84132 ASSAY OF SERUM POTASSIUM: CPT | Performed by: NURSE PRACTITIONER

## 2025-03-14 PROCEDURE — C1751 CATH, INF, PER/CENT/MIDLINE: HCPCS | Performed by: INTERNAL MEDICINE

## 2025-03-14 RX ORDER — OXYCODONE HYDROCHLORIDE 5 MG/1
5 TABLET ORAL EVERY 4 HOURS PRN
Status: DISCONTINUED | OUTPATIENT
Start: 2025-03-14 | End: 2025-03-14 | Stop reason: HOSPADM

## 2025-03-14 RX ORDER — NALOXONE HYDROCHLORIDE 0.4 MG/ML
0.2 INJECTION, SOLUTION INTRAMUSCULAR; INTRAVENOUS; SUBCUTANEOUS
Status: DISCONTINUED | OUTPATIENT
Start: 2025-03-14 | End: 2025-03-14 | Stop reason: HOSPADM

## 2025-03-14 RX ORDER — FLUMAZENIL 0.1 MG/ML
0.2 INJECTION, SOLUTION INTRAVENOUS
Status: DISCONTINUED | OUTPATIENT
Start: 2025-03-14 | End: 2025-03-14 | Stop reason: HOSPADM

## 2025-03-14 RX ORDER — ASPIRIN 325 MG
325 TABLET ORAL ONCE
Status: COMPLETED | OUTPATIENT
Start: 2025-03-14 | End: 2025-03-14

## 2025-03-14 RX ORDER — NICARDIPINE HYDROCHLORIDE 2.5 MG/ML
INJECTION INTRAVENOUS
Status: DISCONTINUED | OUTPATIENT
Start: 2025-03-14 | End: 2025-03-14 | Stop reason: HOSPADM

## 2025-03-14 RX ORDER — ASPIRIN 81 MG/1
243 TABLET, CHEWABLE ORAL ONCE
Status: COMPLETED | OUTPATIENT
Start: 2025-03-14 | End: 2025-03-14

## 2025-03-14 RX ORDER — POTASSIUM CHLORIDE 750 MG/1
40 TABLET, EXTENDED RELEASE ORAL
Status: DISCONTINUED | OUTPATIENT
Start: 2025-03-14 | End: 2025-03-14 | Stop reason: HOSPADM

## 2025-03-14 RX ORDER — NALOXONE HYDROCHLORIDE 0.4 MG/ML
0.4 INJECTION, SOLUTION INTRAMUSCULAR; INTRAVENOUS; SUBCUTANEOUS
Status: DISCONTINUED | OUTPATIENT
Start: 2025-03-14 | End: 2025-03-14 | Stop reason: HOSPADM

## 2025-03-14 RX ORDER — SODIUM CHLORIDE 9 MG/ML
INJECTION, SOLUTION INTRAVENOUS CONTINUOUS
Status: DISCONTINUED | OUTPATIENT
Start: 2025-03-14 | End: 2025-03-14 | Stop reason: HOSPADM

## 2025-03-14 RX ORDER — NITROGLYCERIN 5 MG/ML
VIAL (ML) INTRAVENOUS
Status: DISCONTINUED | OUTPATIENT
Start: 2025-03-14 | End: 2025-03-14 | Stop reason: HOSPADM

## 2025-03-14 RX ORDER — FENTANYL CITRATE 50 UG/ML
INJECTION, SOLUTION INTRAMUSCULAR; INTRAVENOUS
Status: DISCONTINUED | OUTPATIENT
Start: 2025-03-14 | End: 2025-03-14 | Stop reason: HOSPADM

## 2025-03-14 RX ORDER — HEPARIN SODIUM 1000 [USP'U]/ML
INJECTION, SOLUTION INTRAVENOUS; SUBCUTANEOUS
Status: DISCONTINUED | OUTPATIENT
Start: 2025-03-14 | End: 2025-03-14 | Stop reason: HOSPADM

## 2025-03-14 RX ORDER — IOPAMIDOL 755 MG/ML
INJECTION, SOLUTION INTRAVASCULAR
Status: DISCONTINUED | OUTPATIENT
Start: 2025-03-14 | End: 2025-03-14 | Stop reason: HOSPADM

## 2025-03-14 RX ORDER — LIDOCAINE 40 MG/G
CREAM TOPICAL
Status: DISCONTINUED | OUTPATIENT
Start: 2025-03-14 | End: 2025-03-14 | Stop reason: HOSPADM

## 2025-03-14 RX ORDER — FENTANYL CITRATE 50 UG/ML
25 INJECTION, SOLUTION INTRAMUSCULAR; INTRAVENOUS
Status: DISCONTINUED | OUTPATIENT
Start: 2025-03-14 | End: 2025-03-14 | Stop reason: HOSPADM

## 2025-03-14 RX ORDER — ACETAMINOPHEN 325 MG/1
650 TABLET ORAL EVERY 4 HOURS PRN
Status: DISCONTINUED | OUTPATIENT
Start: 2025-03-14 | End: 2025-03-14 | Stop reason: HOSPADM

## 2025-03-14 RX ORDER — OXYCODONE HYDROCHLORIDE 10 MG/1
10 TABLET ORAL EVERY 4 HOURS PRN
Status: DISCONTINUED | OUTPATIENT
Start: 2025-03-14 | End: 2025-03-14 | Stop reason: HOSPADM

## 2025-03-14 RX ORDER — POTASSIUM CHLORIDE 750 MG/1
20 TABLET, EXTENDED RELEASE ORAL
Status: DISCONTINUED | OUTPATIENT
Start: 2025-03-14 | End: 2025-03-14 | Stop reason: HOSPADM

## 2025-03-14 RX ORDER — ATROPINE SULFATE 0.1 MG/ML
0.5 INJECTION INTRAVENOUS
Status: DISCONTINUED | OUTPATIENT
Start: 2025-03-14 | End: 2025-03-14 | Stop reason: HOSPADM

## 2025-03-14 RX ADMIN — SODIUM CHLORIDE: 0.9 INJECTION, SOLUTION INTRAVENOUS at 11:29

## 2025-03-14 RX ADMIN — ASPIRIN 325 MG ORAL TABLET 325 MG: 325 PILL ORAL at 11:21

## 2025-03-14 RX ADMIN — ACETAMINOPHEN 650 MG: 325 TABLET, FILM COATED ORAL at 18:35

## 2025-03-14 RX ADMIN — LIDOCAINE: 40 CREAM TOPICAL at 11:33

## 2025-03-14 ASSESSMENT — ACTIVITIES OF DAILY LIVING (ADL)
ADLS_ACUITY_SCORE: 41

## 2025-03-14 NOTE — Clinical Note
dry, intact, no bleeding and no hematoma. 7F venous sheath removed from the RIJ, manual pressure held.  No complications.  6F arterial sheath removed from the left radial artery, TR band in place.

## 2025-03-14 NOTE — PRE-PROCEDURE
GENERAL PRE-PROCEDURE:   Procedure:  RHC/LHC/coronary angiogram  Date/Time:  3/14/2025 11:39 AM    Verbal consent obtained?: Yes    Written consent obtained?: Yes    Risks and benefits: Risks, benefits and alternatives were discussed    DC Plan: Appropriate discharge home plan in place for patients who are going home after procedure   Consent given by:  Patient  Patient states understanding of procedure being performed: Yes    Patient's understanding of procedure matches consent: Yes    Procedure consent matches procedure scheduled: Yes    Expected level of sedation:  Moderate  Appropriately NPO:  Yes  ASA Class:  3  Mallampati  :  Grade 1- soft palate, uvula, tonsillar pillars, and posterior pharyngeal wall visible  Lungs:  Lungs clear with good breath sounds bilaterally  Heart:  Normal heart sounds and rate  History & Physical reviewed:  History and physical reviewed and updates made (see comment)  H&P Comments:  Clinically Significant Risk Factors Present on Admission    Cardiovascular: Non-Rheumatic Valve Disease: Mitral valve prolapse    Fluid & Electrolyte Disorders : Not present on admission    Gastroenterology : Not present on admission    Hematology/Oncology : Not present on admission    Nephrology : Not present on admission    Neurology : Not present on admission    Pulmonology : Not present on admission    Systemic : Not present on admission    [unfilled]    Statement of review:  I have reviewed the lab findings, diagnostic data, medications, and the plan for sedation    Consenting/Education for Cardiology Procedure: Right heart catheterization , Left heart catheterization, Possible percutaneous intervention, and Coronary angiogram    Patient understands we would like to perform the listed procedure(s) due to mitral valve prolapse, workup for surgery.    The patient understands the following:     The procedure was described to the patient in detail.    Moderate sedation is required for  this procedure and the risks, benefits and alternatives to moderate sedation were discussed. Patient also understands risks and complications of the procedure which include but are not limited to bruising/swelling around the incision site, infection, bleeding, allergic reaction to local anesthetic, air embolism, arterial puncture, stroke, heart attack, need for emergency surgery, death.    Patient verbalized understanding of risks and benefits and has elected to proceed with the procedure or procedures listed above.    Linus Angeles, BAKARI  Interventional Cardiology

## 2025-03-14 NOTE — PROGRESS NOTES
D/I/A: Pt roomed on 2A, room 9.  Arrived via litter and accompanied by RN off monitor.  VSSA.  Rhythm upon arrival: SR on monitor.  Denies pain or sob.  Reviewed activity restrictions and when to notify RN, ie-changes to breathing or increased chest pressure or chest pain.  CCL access: Right internal jugular with primapore soft, dry and intact. Left radial TR band with 12cc of air. CMS intact. Pt eating/drinking. Pt and spouse updated by MD at bedside.   P: Continue to monitor status.  Discharge to home once meeting criteria.

## 2025-03-14 NOTE — DISCHARGE INSTRUCTIONS
Going Home after a Coronary Angiogram, Right and Left Heart Catherization        After you go home:  Have an adult stay with you for 24 hours.  Drink plenty of fluids.  You may eat your normal diet, unless your doctor tells you otherwise.  Do not scrub the procedure site vigorously  No lotion or powder to the puncture site for 3 days.  For 24 hours:  - Relax and take it easy.  - Do NOT smoke.  - Do NOT make any important or legal decisions.  - Do NOT drive or operate machines at home or at work.  - Do NOT drink alcohol.    Remove the Dressings after 24 hours. If there is minor oozing, apply another Band-aid and remove it after 12 hours.  For 2 days, do NOT have sex or do any heavy exercise.  Do NOT take a bath, or use a hot tub or pool for at least 3 days. You may shower.    Care of wrist or arm site  It is normal to have soreness at the puncture site and mild tingling in your hand for up to 3 days.  For 2 days, do not use your hand or arm to support your weight (such as rising from a chair) or bend your wrist (such as lifting a garage door).  For 2 days, do not lift more than 5 pounds or exercise your arm (tennis, golf or bowling).      If you start bleeding from the site in your arm:  Sit down and press firmly on the site with your fingers for 10 minutes. Call your doctor as soon as you can.  If the bleeding stops, sit still and keep your wrist straight for 2 hours.    Medicines  No changes to your medication.     Call your doctor if:  You have a large or growing hard lump around the site.    The site is red, swollen, hot or tender.  Blood or fluid is draining from the site.  You have chills or a fever greater than 101 F (38 C).  Your leg or arm turns bluish, feels numb or cool.  You have hives, a rash or unusual itching.    CALL YOUR PRIMARY PHYSICIAN IF: Monitor neck site for bleeding, swelling, or voice changes. If you notice bleeding or swelling immediately apply pressure to the site and call number below to  speak with Cardiology Fellow.  If you experience any changes in your breathing you should call your doctor immediately or come to the closest Emergency Department.  Do not drive yourself.    Call [phone] right away if you have:   Bleeding that does not stop.   Heavy bleeding.    Follow Up: Per your primary cardiology team    If you have any questions or concerns regarding your procedure site please call [phone] at anytime and ask for Cardiology Fellow on call.  They are available 24 hours a day.  You may also contact the Cardiology Clinic after hours number at [phone].                                                       Telephone Numbers [phone] Monday-Friday 8:00 am to 4:30 pm    [phone] After 4:30 pm Monday-Friday, Weekends & Holidays  Ask for Interventional Cardiologist on call. Someone is on call 24 hours/day   [facility] toll free number [phone] Monday-Friday 8:00 am to 4:30 pm   [facility] Emergency Dept [phone]

## 2025-03-14 NOTE — PROGRESS NOTES
Pt arrived on 2A, room 9 for RHC, LHC, Coronary Angiogram. Vitals stable. Denies any pain. Labs reviewed. Consent signed and H and P is up to date. Aspirin 325mg given as pt did not take any dose today. Groin prepped and pulses marked. Wife Gretchen at bedside: [phone].

## 2025-03-15 NOTE — PROGRESS NOTES
Left radial TR band deflated, site covered with primapore remained soft, dry and intact. CMS intact. Pt c/o  pain at the puncture site radiating to his shoulder, tylenol given with some relief. Dr Mcneal notified, warm compress applied per MD recommendation. Pt will call if pain gets worse. Right internal jugular site covered with tegaderm soft, dry and intact. Pt ate/drank, voided and ambulated in [place] with steady gait. Vitals stable. Rhythm: SR. Discharge instructions were reviewed with pt and spouse and they verbalizes understanding. Copy of AVS given. PIV removed. Pt discharged ambulatory to the  desk accompanied by his wife. Pt has all his belongings.

## 2025-03-16 LAB
ATRIAL RATE - MUSE: 78 BPM
DIASTOLIC BLOOD PRESSURE - MUSE: NORMAL MMHG
INTERPRETATION ECG - MUSE: NORMAL
P AXIS - MUSE: 40 DEGREES
PR INTERVAL - MUSE: 148 MS
QRS DURATION - MUSE: 80 MS
QT - MUSE: 378 MS
QTC - MUSE: 430 MS
R AXIS - MUSE: -10 DEGREES
SYSTOLIC BLOOD PRESSURE - MUSE: NORMAL MMHG
T AXIS - MUSE: 4 DEGREES
VENTRICULAR RATE- MUSE: 78 BPM

## 2025-03-17 ENCOUNTER — TELEPHONE (OUTPATIENT)
Dept: CARDIOLOGY | Facility: CLINIC | Age: 45
End: 2025-03-17
Payer: COMMERCIAL

## 2025-03-17 NOTE — TELEPHONE ENCOUNTER
Pt needs to reschedule CT MICs due to ins auth not received. Rescheduled and left VM for pt informing.

## 2025-03-26 ENCOUNTER — TELEPHONE (OUTPATIENT)
Dept: CARDIOLOGY | Facility: CLINIC | Age: 45
End: 2025-03-26
Payer: COMMERCIAL

## 2025-03-27 DIAGNOSIS — I34.0 NONRHEUMATIC MITRAL VALVE REGURGITATION: Primary | ICD-10-CM

## 2025-03-27 NOTE — TELEPHONE ENCOUNTER
FUTURE VISIT INFORMATION      SURGERY INFORMATION:  Date: 4/30/25  Location: [facility] OR  Surgeon:  Debbie Maxwell MD   Anesthesia Type:  General   Procedure: REPAIR, MITRAL VALVE, MINIMALLY INVASIVE, TRANSESOPHAGEAL ECHOCARDIOGRAM (ARMANDO) AND ANY ASSOCIATED PROCEDURES  Consult: 2/6/25    RECORDS REQUESTED FROM:       Primary Care Provider: Kaushik Sun MD - [facility]     Pertinent Medical History: Mitral valve prolapse    Most recent EKG+ Tracing: 3/14/25    Most recent ECHO: 1/23/25    Most recent Coronary Angiogram: 3/14/25

## 2025-03-30 LAB
ABO + RH BLD: NORMAL
BLD GP AB SCN SERPL QL: NEGATIVE
SPECIMEN EXP DATE BLD: NORMAL

## 2025-03-31 ENCOUNTER — LAB (OUTPATIENT)
Dept: LAB | Facility: CLINIC | Age: 45
End: 2025-03-31
Attending: FAMILY MEDICINE
Payer: COMMERCIAL

## 2025-03-31 ENCOUNTER — HOSPITAL ENCOUNTER (OUTPATIENT)
Dept: CT IMAGING | Facility: CLINIC | Age: 45
Discharge: HOME | End: 2025-03-31
Attending: SURGERY
Payer: COMMERCIAL

## 2025-03-31 DIAGNOSIS — I34.0 NONRHEUMATIC MITRAL VALVE REGURGITATION: ICD-10-CM

## 2025-03-31 DIAGNOSIS — I34.1 MITRAL VALVE PROLAPSE: ICD-10-CM

## 2025-03-31 PROCEDURE — 74174 CTA ABD&PLVS W/CONTRAST: CPT | Mod: 26 | Performed by: INTERNAL MEDICINE

## 2025-03-31 PROCEDURE — 71275 CT ANGIOGRAPHY CHEST: CPT | Mod: 26 | Performed by: INTERNAL MEDICINE

## 2025-03-31 PROCEDURE — 71275 CT ANGIOGRAPHY CHEST: CPT

## 2025-03-31 PROCEDURE — 86900 BLOOD TYPING SEROLOGIC ABO: CPT

## 2025-03-31 PROCEDURE — 250N000011 HC RX IP 250 OP 636: Performed by: INTERNAL MEDICINE

## 2025-03-31 PROCEDURE — 86850 RBC ANTIBODY SCREEN: CPT

## 2025-03-31 PROCEDURE — 36415 COLL VENOUS BLD VENIPUNCTURE: CPT

## 2025-03-31 RX ORDER — IOPAMIDOL 755 MG/ML
110 INJECTION, SOLUTION INTRAVASCULAR ONCE
Status: COMPLETED | OUTPATIENT
Start: 2025-03-31 | End: 2025-03-31

## 2025-03-31 RX ADMIN — IOPAMIDOL 110 ML: 755 INJECTION, SOLUTION INTRAVENOUS at 14:00

## 2025-04-03 ENCOUNTER — PRE VISIT (OUTPATIENT)
Dept: SURGERY | Facility: CLINIC | Age: 45
End: 2025-04-03

## 2025-04-03 ENCOUNTER — VIRTUAL VISIT (OUTPATIENT)
Dept: SURGERY | Facility: CLINIC | Age: 45
End: 2025-04-03
Payer: COMMERCIAL

## 2025-04-03 VITALS — HEIGHT: 72 IN | BODY MASS INDEX: 26.14 KG/M2 | WEIGHT: 193 LBS

## 2025-04-03 DIAGNOSIS — I34.0 NONRHEUMATIC MITRAL VALVE REGURGITATION: ICD-10-CM

## 2025-04-03 DIAGNOSIS — Z01.818 PREOP EXAMINATION: Primary | ICD-10-CM

## 2025-04-03 RX ORDER — NAPROXEN SODIUM 220 MG/1
220 TABLET, FILM COATED ORAL 2 TIMES DAILY PRN
COMMUNITY

## 2025-04-03 ASSESSMENT — ENCOUNTER SYMPTOMS
DYSRHYTHMIAS: 0
SEIZURES: 1

## 2025-04-03 ASSESSMENT — LIFESTYLE VARIABLES: TOBACCO_USE: 1

## 2025-04-03 NOTE — PATIENT INSTRUCTIONS
Preparing for Your Surgery      Name:  Johnie Buenrostro   MRN:  [id number]   :  1980   Today's Date:  4/3/2025     The [place] [employer] [address] Construction Project                                Timeline 2025 -2025    This project will affect travel to the [facility] and [facility], as well as the [facility] and [facility] Center.      Please check the [facility] [address] project website for the most up to date information and give yourself additional time to reach your destination.        Arriving for surgery:  Surgery date:  2025  Arrival time:  5:00 am    Please come to:         [facility]    [address] [place]  [address]     The [facility] [place] Patient/Visitor Ramp is at [address]. Patients and visitors who self-park will receive the reduced hospital parking rate. If the Patient /Visitor Ramp is full, please follow the signs to the [employer] car park located at the [facility] entrance.      [address] parking is available (24 hours/ 7 days a week)      Discounted parking pass options are available for patients and visitors. They can be purchased at the [employer] Park and Ride desk at the [facility] entrance.     -    Stop at the security desk and they will direct surgery patients to the Surgery Check in and Family Lounge. [phone]        - If you need directions, a wheelchair or an escort please stop at the Information/security desk in the lobby.     What can I eat or drink?  -  You may eat and drink normally up to 8 hours prior to arrival time. (Until 25 at 10 pm)  -  You may have clear liquids until 2 hours prior to arrival time. (Until 3 am)    Examples of clear liquids:  Water  Clear broth  Juices (apple, white grape, white cranberry  and cider) without pulp  Noncarbonated, powder based beverages  (lemonade and Aries-Aid)  Sodas  (Karmen, 7-Up, ginger ale and seltzer)  Coffee or tea (without milk or cream)  Gatorade    -  No Alcohol or cannabis products for at least 24 hours before surgery.     Which medicines can I take?    Hold Multivitamins for 7 days before surgery.  Hold Supplements for 7 days before surgery.  Hold Ibuprofen (Advil, Motrin) for 1 day(s) before surgery--unless otherwise directed by surgeon.    Hold Naproxen (Aleve) for 4 days before surgery.    -  DO NOT take these medications the day of surgery:  Adderall   Vitamin D      -  PLEASE TAKE these medications the day of surgery:  NONE      How do I prepare myself?  - Please take 2 showers (one the night prior to surgery and one the morning of surgery) using Scrubcare or Hibiclens soap.    Use this soap only from the neck to your toes. Avoid genital area      Leave the soap on your skin for one minute--then rinse thoroughly.      You may use your own shampoo and conditioner. No other hair products.   - Please remove all jewelry and body piercings.  - No lotions, deodorants or fragrance.  - No makeup or fingernail polish.   - Bring your ID and insurance card.    -For patients being admitted to the [facility]  Family members are to take the patient belongings with them and place them in the lockers provided in the Family Lounge.  Please limit the items you bring to 1 bag as the lockers are small.      -If you use a CPAP machine, please bring the CPAP machine, tubing, and mask to hospital.    -If you have a Deep Brain Stimulator, Spinal Cord Stimulator, or any Neuro Stimulator device---you must bring the remote control to the hospital.      ALL PATIENTS GOING HOME THE SAME DAY OF SURGERY ARE REQUIRED TO HAVE A RESPONSIBLE ADULT TO DRIVE AND BE IN ATTENDANCE WITH THEM FOR 24 HOURS FOLLOWING SURGERY.    Covid testing policy as of 12/06/2022  Your surgeon will notify and schedule you for a COVID test if one is needed before surgery--please direct any questions or COVID  symptoms to your surgeon      Questions or Concerns:    - For any questions regarding the day of surgery or your hospital stay, please contact the Pre Admission Nursing Office at [phone].       - If you have health changes between today and your surgery, please call your surgeon.   Please call me with any questions.  Thank you,  Sharda Youssef RN  Cardiothoracic Surgery Coordinator  [phone]  Direct Phone  [phone]  Fax         - For questions after surgery, please call your surgeons office.           Current Visitor Guidelines    2 adult visitors for adult patients in the pre op area    If additional visitors come (beyond a patient care attendant or a group home caregiver), the additional visitors will be asked to wait in the main lobby of the hospital    Visiting hours: 8 a.m. to 8:30 p.m.    Patients confirmed or suspected to have symptoms of COVID 19 or flu:     No visitors allowed for adult patients.   Children (under age 18) can have 1 named visitor.     People who are sick or showing symptoms of COVID 19 or flu:    Are not allowed to visit patients--we can only make exceptions in special situations.       Please follow these guidelines for your visit:          Please maintain social distance          Masking is optional--however at times you may be asked to wear a mask for the safety of yourself and others     Clean your hands with alcohol hand . Do this when you arrive at and leave the building and patient room,    And again after you touch your mask or anything in the room.     Go directly to and from the room you are visiting.     Stay in the patient s room during your visit. Limit going to other places in the hospital as much as possible     Leave bags and jackets at home or in the car.     For everyone s health, please don t come and go during your visit. That includes for smoking   during your visit.

## 2025-04-03 NOTE — H&P
Pre-Operative H & P     CC:  Preoperative exam to assess for increased cardiopulmonary risk while undergoing surgery and anesthesia.    Date of Encounter: 4/3/2025  Primary Care Physician:  Kaushik Sun     Reason for visit:   Encounter Diagnoses   Name Primary?    Preop examination Yes    Nonrheumatic mitral valve regurgitation        HPI  Johnie Buenrostro is a 45 year old male who presents for pre-operative H & P in preparation for  Procedure Information       Case: [id number] Date/Time: 04/30/25 0730    Procedure: REPAIR, MITRAL VALVE, MINIMALLY INVASIVE, Transesophageal Echocardiogram (ARMANDO) AND ANY ASSOCIATED PROCEDURES (Chest)    Anesthesia type: General    Diagnosis: Mitral regurgitation [I34.0]    Pre-op diagnosis: Mitral regurgitation [I34.0]    Location:  OR  /  OR    Providers: Debbie Maxwell MD          History is obtained from the patient and chart review    Patient who was recently referred to Dr. 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 His LV function is preserved. He has been counseled for above procedures.     On 3/14/2025, a coronary angiogram was performed by Dr. Reynoso with findings of normal bilateral filling pressures, pulmonary artery pressure, and cardiac output.  Coronary arteries were angiographically normal.    The patient was initially seen in the [facility] on 3/7/2025 for an earlier surgery date, that had to be rescheduled.     He presents today virtually for updated preop evaluation for above surgery.    There have been no changes in the interim, except that he has had a minor injury to his back with low back soreness, improving with conservative measures. He continues to swim regularly.    His history  is otherwise significant for ADHD and epilepsy as a child, on meds until 2017.      Hx of abnormal bleeding or anti-platelet use: Denies      Past Medical History  Past Medical History:   Diagnosis Date    ADHD (attention deficit hyperactivity disorder)     Epilepsy (H)     as child    Mitral valve regurgitation     severe    Myxomatous mitral valve        Past Surgical History  Past Surgical History:   Procedure Laterality Date    COLONOSCOPY      X 2    CV CORONARY ANGIOGRAM N/A 3/14/2025    Procedure: Coronary Angiogram;  Surgeon: Cain Juarez MD;  Location:  [facility]    CV RIGHT HEART CATH MEASUREMENTS RECORDED N/A 3/14/2025    Procedure: Right Heart Catheterization;  Surgeon: Cain Juarez MD;  Location:  [facility]ARD[facility] CATH LAB    [place] teeth extraction      age 16       Prior to Admission Medications  Current Outpatient Medications   Medication Sig Dispense Refill    amphetamine-dextroamphetamine (ADDERALL XR) 10 MG 24 hr capsule Take 10 mg by mouth every morning.      naproxen sodium (ANAPROX) 220 MG tablet Take 220 mg by mouth 2 times daily as needed for moderate pain.      amphetamine-dextroamphetamine (ADDERALL) 15 MG tablet Take 0.5 tablets by mouth every morning.      cholecalciferol (VITAMIN D3) 125 mcg (5000 units) capsule Take 125 mcg by mouth every morning. (Patient not taking: Reported on 4/3/2025)         Allergies  Allergies   Allergen Reactions    Berm[place] Grass Shortness Of Breath    Mugwort Shortness Of Breath    Penicillins Hives and Rash       Social History  Social History     Socioeconomic History    Marital status:      Spouse name: Not on file    Number of children: Not on file    Years of education: Not on file    Highest education level: Not on file   Occupational History    Not on file   Tobacco Use    Smoking status: Some Days     Types: Cigars     Passive exposure: Current    Smokeless tobacco: Never    Tobacco comments:     Once a month    Substance  and Sexual Activity    Alcohol use: Yes     Comment: 1 drink a day    Drug use: Never    Sexual activity: Not on file   Other Topics Concern    Not on file   Social History Narrative    Not on file     Social Drivers of Health     Financial Resource Strain: High Risk (1/1/2022)    Received from [facility]    Financial Resource Strain     Difficulty of Paying Living Expenses: Not on file     Difficulty of Paying Living Expenses: Not on file   Food Insecurity: Not on file   Transportation Needs: Not on file   Physical Activity: Not on file   Stress: Not on file   Social Connections: Unknown (1/1/2022)    Received from [facility]    Social Connections     Frequency of Communication with Friends and Family: Not on file   Interpersonal Safety: Unknown (3/14/2025)    Interpersonal Safety     Do you feel physically and emotionally safe where you currently live?: Patient unable to answer     Within the past 12 months, have you been hit, slapped, kicked or otherwise physically hurt by someone?: Patient unable to answer     Within the past 12 months, have you been humiliated or emotionally abused in other ways by your partner or ex-partner?: Patient unable to answer   Housing Stability: Not on file       Family History  Family History   Problem Relation Age of Onset    Anesthesia Reaction Mother         Woke up during surgery    Bleeding Disorder No family hx of     Clotting Disorder No family hx of        Review of Systems  The complete review of systems is negative other than noted in the HPI or here.   Anesthesia Evaluation   Pt has had prior anesthetic. Type: MAC.    History of anesthetic complications  - .  Patient reports waking up during one of his colonoscopies. Angiogram went well.    ROS/MED HX  ENT/Pulmonary: Comment: Occ cigar    (+)                tobacco use,                     (-) recent URI   Neurologic: Comment: ADHD    (+)       seizures,  last seizure: History of epilepsy as child, on meds until 2017, features: no recurrence,                    (-) no CVA   Cardiovascular: Comment: Reports 1 episode of chest pain, at rest, brief, with no associated symptoms. No recurrence. Angiogram on 3/14/25    (+)  - -   -  - -                           valvular problems/murmurs type: MR severe.    Previous cardiac testing   Echo: Date: 1/23/25 Results:    Stress Test:  Date: Results:    ECG Reviewed:  Date: 3/14/25 Results:  Sinus rhythm with sinus arrhythmia   Normal ECG     Cath:  Date: 3/14/25 Results:   (-) taking anticoagulants/antiplatelets, ESQUIVEL and arrhythmias   METS/Exercise Tolerance: >4 METS Comment: Vigorous swim routine for exercise without exertional symptoms   Hematologic:    (-) history of blood clots and history of blood transfusion   Musculoskeletal: Comment: Low back pain with recent injury, mild, improving with conservative measures      GI/Hepatic:    (-) GERD   Renal/Genitourinary:  - neg Renal ROS     Endo:    (-) Type II DM   Psychiatric/Substance Use:  - neg psychiatric ROS  (-) psychiatric history   Infectious Disease:  - neg infectious disease ROS     Malignancy:  - neg malignancy ROS     Other:  - neg other ROS          Virtual visit -  No vitals were obtained    Physical Exam  Constitutional: Awake, alert, no apparent distress, and appears stated age.  HENT: Normocephalic  Respiratory: non labored breathing: no cough   Neurologic: Oriented to name, place and time.   Neuropsychiatric: Calm, cooperative. Normal affect.      Prior Labs/Diagnostic Studies   All labs and imaging personally reviewed   Lab Results   Component Value Date    WBC 6.6 03/14/2025     Lab Results   Component Value Date    RBC 4.78 03/14/2025     Lab Results   Component Value Date    HGB 14.9 03/14/2025     Lab Results   Component Value Date    HCT 43.8 03/14/2025     Lab Results   Component Value Date    MCV 92 03/14/2025     Lab Results   Component Value Date     MCH 31.2 03/14/2025     Lab Results   Component Value Date    MCHC 34.0 03/14/2025     Lab Results   Component Value Date    RDW 12.7 03/14/2025     Lab Results   Component Value Date     03/14/2025     Last Comprehensive Metabolic Panel:  Sodium   Date Value Ref Range Status   03/14/2025 139 135 - 145 mmol/L Final     Potassium   Date Value Ref Range Status   03/14/2025 4.3 3.4 - 5.3 mmol/L Final     Chloride   Date Value Ref Range Status   03/14/2025 103 98 - 107 mmol/L Final     Carbon Dioxide (CO2)   Date Value Ref Range Status   03/14/2025 23 22 - 29 mmol/L Final     Anion Gap   Date Value Ref Range Status   03/14/2025 13 7 - 15 mmol/L Final     Glucose   Date Value Ref Range Status   03/14/2025 89 70 - 99 mg/dL Final     Urea Nitrogen   Date Value Ref Range Status   03/14/2025 14.3 6.0 - 20.0 mg/dL Final     Creatinine   Date Value Ref Range Status   03/14/2025 0.81 0.67 - 1.17 mg/dL Final     GFR Estimate   Date Value Ref Range Status   03/14/2025 >90 >60 mL/min/1.73m2 Final     Comment:     eGFR calculated using 2021 CKD-EPI equation.     Calcium   Date Value Ref Range Status   03/14/2025 9.5 8.8 - 10.4 mg/dL Final     Bilirubin Total   Date Value Ref Range Status   03/07/2025 1.2 <=1.2 mg/dL Final     Alkaline Phosphatase   Date Value Ref Range Status   03/07/2025 59 40 - 150 U/L Final     ALT   Date Value Ref Range Status   03/07/2025 22 0 - 70 U/L Final     AST   Date Value Ref Range Status   03/07/2025 21 0 - 45 U/L Final     INR 1.04  PTT 28    Last Mg 2.2  Type and screen     EKG: 3/14/25 Sinus rhythm with sinus arrhythmia   Normal ECG     Echocardiogram (ARMANDO)1/23/25  Interpretation Summary  The visual ejection fraction is 55-60%. No regional wall motion abnormalities  are seen.  The right ventricle is normal in size and function.     Severe mitral insufficiency(ERO 0.54, RVol of 83cc) in the setting of mymatous  degeneration with bileaflet prolapse and flail P2. Cleft also noted between P2  and  P1 scallops.     The left atrial appendage is normal. It is free of spontaneous echo contrast  and thrombus.  The atrial septum is intact as assessed by color Doppler .     No pericardial effusion.     The patient's records and results personally reviewed by this provider.     Outside records reviewed from: Care Everywhere      Assessment    Johnie Buenrostro is a 45 year old male seen as a PAC referral for risk assessment and optimization for anesthesia.    Plan/Recommendations  Pt will be optimized for the proposed procedure.  See below for details on the assessment, risk, and preoperative recommendations    Anesthesia concern: Patient reports waking up earlier than expected with sedation procedures.  Angiogram went well.    NEUROLOGY  History of epilepsy as a child, followed by neurology and on medication until 2017.  No recurrence  Denies stroke history  ADHD.  Will hold Adderall on day of surgery    -Post Op delirium risk factors:  No risk identified    ENT  - No current airway concerns.  Will need to be reassessed day of surgery.  Mallampati: Unable to assess  TM: Unable to assess    Denies swallowing difficulty    CARDIAC  Normal BP range. Patient knew he had a murmur growing up, but was not aware of his severe regurgitation until evaluation.  Today he denies chest pain, irregular heart rate, or dyspnea on exertion. Coronary arteries were angiographically normal on recent cardiac cath. Swims regularly for exercise with no exertional symptoms. All testing above  - METS (Metabolic Equivalents)>4    PULMONARY  Monthly cigar  Denies cough or use of inhaler  Low risk for ELISA  - Tobacco History    History   Smoking Status    Some Days    Types: Cigars   Smokeless Tobacco    Never       GI: Denies GERD    /RENAL  - Baseline Creatinine  0.81    ENDOCRINE    - BMI: Estimated body mass index is 26.18 kg/m  as calculated from the following:    Height as of this encounter: 1.829 m (6').    Weight as of this encounter: 87.5  kg (193 lb).  Overweight (BMI 25.0-29.9)  - No history of Diabetes Mellitus  Last A1C  5.1    HEME  VTE Low Risk 0.26%             Total Score: 0      Denies personal or family history of blood clots  Denies personal or family history of bleeding disorder  Denies history of blood transfusion   Type and screen drawn by service     TOMMY  Patient is NOT Frail             Total Score: 0      Patient has sore lower back at this time, is being managed conservatively, and reported improvement      Different anesthesia methods/types have been discussed with the patient, but they are aware that the final plan will be decided by the assigned anesthesia provider on the date of service.    The patient is optimized for their procedure. AVS with information on surgery time/arrival time, meds and NPO status given by nursing staff. No further diagnostic testing indicated.    Please refer to the physical examination documented by the anesthesiologist in the anesthesia record on the day of surgery.    Video-Visit Details    Type of service:  Video Visit    Provider received verbal consent for a Video Visit from the patient? Yes   Video Start Time:  9:00am  Video End Time: 9:12am    Originating Location (pt. Location): Home    Distant Location (provider location):  Off-site  Mode of Communication:  Video Conference via [employer] when no connection with [employer]  On the day of service:     Prep time: 10 minutes  Visit time: 12 minutes  Documentation time: 13 minutes  ------------------------------------------  Total time: 35 minutes      AMY Hernandez CNS  Preoperative Assessment Center  [facility]  Phone: [phone]  Fax: [phone]

## 2025-04-28 ASSESSMENT — ENCOUNTER SYMPTOMS
SEIZURES: 1
DYSRHYTHMIAS: 0

## 2025-04-28 ASSESSMENT — LIFESTYLE VARIABLES: TOBACCO_USE: 1

## 2025-04-28 NOTE — ANESTHESIA PREPROCEDURE EVALUATION
Anesthesia Pre-Procedure Evaluation    Patient: Johnie Buenrostro   MRN: [id number] : 1980        Procedure : Procedure(s):  REPAIR, MITRAL VALVE, MINIMALLY INVASIVE, Transesophageal Echocardiogram (ARMANDO) AND ANY ASSOCIATED PROCEDURES          Past Medical History:   Diagnosis Date    ADHD (attention deficit hyperactivity disorder)     Epilepsy (H)     as child    Mitral valve regurgitation     severe    Myxomatous mitral valve       Past Surgical History:   Procedure Laterality Date    COLONOSCOPY      X 2    CV CORONARY ANGIOGRAM N/A 3/14/2025    Procedure: Coronary Angiogram;  Surgeon: Cain Juarez MD;  [facility]    CV RIGHT HEART CATH MEASUREMENTS RECORDED N/A 3/14/2025    Procedure: Right Heart Catheterization;  Surgeon: Cain Juarez MD;  Location:  HEART CARDIAC CATH LAB    [place] teeth extraction      age 16      Allergies   Allergen Reactions    Berm[place] Grass Shortness Of Breath    Mugwort Shortness Of Breath    Penicillins Hives and Rash      Social History     Tobacco Use    Smoking status: Some Days     Types: Cigars     Passive exposure: Current    Smokeless tobacco: Never    Tobacco comments:     Once a month    Substance Use Topics    Alcohol use: Yes     Comment: 1 drink a day      Wt Readings from Last 1 Encounters:   25 87.5 kg (193 lb)        Anesthesia Evaluation   Pt has had prior anesthetic. Type: MAC.    History of anesthetic complications  - .  Patient reports waking up during one of his colonoscopies. Angiogram went well.    ROS/MED HX  ENT/Pulmonary: Comment: Occ cigar    (+)                tobacco use,                     (-) recent URI   Neurologic: Comment: ADHD    (+)       seizures, last seizure: History of epilepsy as child, on meds until 2017, features: no recurrence,                    (-) no CVA   Cardiovascular: Comment: Reports 1 episode of chest pain, at rest, brief, with no associated symptoms. No recurrence. Angiogram on 3/14/25     (+)  - -   -  - -                           valvular problems/murmurs type: MR severe.    Previous cardiac testing   Echo: Date: 1/23/25 Results:  The visual ejection fraction is 55-60%. No regional wall motion abnormalities  are seen.  The right ventricle is normal in size and function.     Severe mitral insufficiency(ERO 0.54, RVol of 83cc) in the setting of mymatous  degeneration with bileaflet prolapse and flail P2. Cleft also noted between P2  and P1 scallops.     The left atrial appendage is normal. It is free of spontaneous echo contrast  and thrombus.  The atrial septum is intact as assessed by color Doppler .     No pericardial effusion.     The patient's records and results personally reviewed by this provider.      Outside records reviewed from: Care Everywhere    Stress Test:  Date: Results:    ECG Reviewed:  Date: 3/14/25 Results:  Sinus rhythm with sinus arrhythmia   Normal ECG     Cath:  Date: 3/14/25 Results:  1. Normal bilateral filling pressures.  2. Normal pulmonary artery pressure.  3. Normal cardiac output.  4. Angiographically normal coronary arteries.  5. Successful uncomplicated left radial arterial and right internal jugular venous access with hemostasis obtained by TR band and manual compression respectively.   (-) taking anticoagulants/antiplatelets, ESQUIVEL and arrhythmias   METS/Exercise Tolerance: >4 METS Comment: Vigorous swim routine for exercise without exertional symptoms   Hematologic:    (-) history of blood clots and history of blood transfusion   Musculoskeletal: Comment: Low back pain with recent injury, mild, improving with conservative measures      GI/Hepatic:    (-) GERD   Renal/Genitourinary:  - neg Renal ROS     Endo:    (-) Type II DM   Psychiatric/Substance Use:  - neg psychiatric ROS  (-) psychiatric history   Infectious Disease:  - neg infectious disease ROS     Malignancy:  - neg malignancy ROS     Other:  - neg other ROS          Physical Exam    Airway       "  Mallampati: I   TM distance: > 3 FB   Neck ROM: full   Mouth opening: > 3 cm    Respiratory Devices and Support         Dental       (+) Completely normal teeth      Cardiovascular   cardiovascular exam normal       Rhythm and rate: regular and normal     Pulmonary   pulmonary exam normal                OUTSIDE LABS:  CBC:   Lab Results   Component Value Date    WBC 6.6 03/14/2025    WBC 4.5 03/07/2025    HGB 14.9 03/14/2025    HGB 15.0 03/07/2025    HCT 43.8 03/14/2025    HCT 45.0 03/07/2025     03/14/2025     03/07/2025     BMP:   Lab Results   Component Value Date     03/14/2025     03/07/2025    POTASSIUM 4.3 03/14/2025    POTASSIUM 4.4 03/07/2025    CHLORIDE 103 03/14/2025    CHLORIDE 103 03/07/2025    CO2 23 03/14/2025    CO2 27 03/07/2025    BUN 14.3 03/14/2025    BUN 14.6 03/07/2025    CR 0.81 03/14/2025    CR 0.83 03/07/2025    GLC 89 03/14/2025    GLC 90 03/07/2025     COAGS:   Lab Results   Component Value Date    PTT 28 03/14/2025    INR 1.04 03/14/2025     POC: No results found for: \"BGM\", \"HCG\", \"HCGS\"  HEPATIC:   Lab Results   Component Value Date    ALBUMIN 4.5 03/07/2025    PROTTOTAL 7.4 03/07/2025    ALT 22 03/07/2025    AST 21 03/07/2025    ALKPHOS 59 03/07/2025    BILITOTAL 1.2 03/07/2025     OTHER:   Lab Results   Component Value Date    A1C 5.1 03/07/2025    JOSE MARIA 9.5 03/14/2025    MAG 2.2 03/07/2025       Anesthesia Plan    ASA Status:  4    NPO Status:  NPO Appropriate    Anesthesia Type: General.     - Airway: ETT   Induction: Intravenous, Propofol.   Maintenance: Balanced.   Techniques and Equipment:     - Lines/Monitors: Arterial Line, Central Line, PAC, CVP, BIS, NIRS, ARMANDO            ARMANDO Absolute Contra-indication: NONE            ARMANDO Relative Contra-indication: NONE     - Blood: T&S, PRBC     - Drips/Meds: Norepi, Epinephrine     Consents    Anesthesia Plan(s) and associated risks, benefits, and realistic alternatives discussed. Questions answered and " patient/representative(s) expressed understanding.     - Discussed: Risks, Benefits and Alternatives for BOTH SEDATION and the PROCEDURE were discussed     - Discussed with:  Patient      - Extended Intubation/Ventilatory Support Discussed: Yes.      - Patient is DNR/DNI Status: No     Use of blood products discussed: Yes.     - Discussed with: Patient.     - Consented: consented to blood products     Postoperative Care    Pain management: IV analgesics, Oral pain medications, Peripheral nerve block (Continuous), Multi-modal analgesia.   PONV prophylaxis: Ondansetron (or other 5HT-3), Dexamethasone or Solumedrol, Background Propofol Infusion     Comments:               Johnie Kelley MD    Clinically Significant Risk Factors Present on Admission                             # Overweight: Estimated body mass index is 26.18 kg/m  as calculated from the following:    Height as of 4/3/25: 1.829 m (6').    Weight as of 4/3/25: 87.5 kg (193 lb).

## 2025-04-30 ENCOUNTER — APPOINTMENT (OUTPATIENT)
Dept: GENERAL RADIOLOGY | Facility: CLINIC | Age: 45
End: 2025-04-30
Attending: STUDENT IN AN ORGANIZED HEALTH CARE EDUCATION/TRAINING PROGRAM
Payer: COMMERCIAL

## 2025-04-30 ENCOUNTER — ANESTHESIA (OUTPATIENT)
Dept: SURGERY | Facility: CLINIC | Age: 45
End: 2025-04-30
Payer: COMMERCIAL

## 2025-04-30 ENCOUNTER — APPOINTMENT (OUTPATIENT)
Dept: GENERAL RADIOLOGY | Facility: CLINIC | Age: 45
End: 2025-04-30
Payer: COMMERCIAL

## 2025-04-30 ENCOUNTER — HOSPITAL ENCOUNTER (INPATIENT)
Facility: CLINIC | Age: 45
End: 2025-04-30
Attending: SURGERY | Admitting: SURGERY
Payer: COMMERCIAL

## 2025-04-30 DIAGNOSIS — Z98.890 S/P MITRAL VALVE REPAIR: Primary | ICD-10-CM

## 2025-04-30 DIAGNOSIS — I34.0 NONRHEUMATIC MITRAL VALVE REGURGITATION: ICD-10-CM

## 2025-04-30 LAB
ABO + RH BLD: NORMAL
ALBUMIN SERPL BCG-MCNC: 3.8 G/DL (ref 3.5–5.2)
ALBUMIN SERPL BCG-MCNC: 4.2 G/DL (ref 3.5–5.2)
ALLEN'S TEST: ABNORMAL
ALLEN'S TEST: NORMAL
ALP SERPL-CCNC: 45 U/L (ref 40–150)
ALP SERPL-CCNC: 51 U/L (ref 40–150)
ALT SERPL W P-5'-P-CCNC: 23 U/L (ref 0–70)
ALT SERPL W P-5'-P-CCNC: 25 U/L (ref 0–70)
ANION GAP SERPL CALCULATED.3IONS-SCNC: 11 MMOL/L (ref 7–15)
ANION GAP SERPL CALCULATED.3IONS-SCNC: 12 MMOL/L (ref 7–15)
APTT PPP: 21 SECONDS (ref 22–38)
APTT PPP: 27 SECONDS (ref 22–38)
AST SERPL W P-5'-P-CCNC: 74 U/L (ref 0–45)
AST SERPL W P-5'-P-CCNC: 81 U/L (ref 0–45)
ATRIAL RATE - MUSE: 83 BPM
ATRIAL RATE - MUSE: 88 BPM
BASE EXCESS BLDA CALC-SCNC: -0.3 MMOL/L (ref -3–3)
BASE EXCESS BLDA CALC-SCNC: -0.3 MMOL/L (ref -3–3)
BASE EXCESS BLDA CALC-SCNC: -0.6 MMOL/L (ref -3–3)
BASE EXCESS BLDA CALC-SCNC: -0.9 MMOL/L (ref -3–3)
BASE EXCESS BLDA CALC-SCNC: -3.7 MMOL/L (ref -3–3)
BASE EXCESS BLDA CALC-SCNC: 0.1 MMOL/L (ref -3–3)
BASE EXCESS BLDA CALC-SCNC: 0.5 MMOL/L (ref -3–3)
BASE EXCESS BLDA CALC-SCNC: 0.7 MMOL/L (ref -3–3)
BASE EXCESS BLDA CALC-SCNC: 1.3 MMOL/L (ref -3–3)
BASE EXCESS BLDV CALC-SCNC: -1.4 MMOL/L (ref -3–3)
BASE EXCESS BLDV CALC-SCNC: 0.2 MMOL/L (ref -3–3)
BASE EXCESS BLDV CALC-SCNC: 0.6 MMOL/L (ref -3–3)
BASE EXCESS BLDV CALC-SCNC: 1.6 MMOL/L (ref -3–3)
BILIRUB SERPL-MCNC: 1.2 MG/DL
BILIRUB SERPL-MCNC: 1.6 MG/DL
BLD GP AB SCN SERPL QL: NEGATIVE
BLD PROD TYP BPU: NORMAL
BLD PROD TYP BPU: NORMAL
BLOOD COMPONENT TYPE: NORMAL
BLOOD COMPONENT TYPE: NORMAL
BUN SERPL-MCNC: 16.4 MG/DL (ref 6–20)
BUN SERPL-MCNC: 18.7 MG/DL (ref 6–20)
CA-I BLD-MCNC: 3.5 MG/DL (ref 4.4–5.2)
CA-I BLD-MCNC: 3.7 MG/DL (ref 4.4–5.2)
CA-I BLD-MCNC: 3.9 MG/DL (ref 4.4–5.2)
CA-I BLD-MCNC: 4 MG/DL (ref 4.4–5.2)
CA-I BLD-MCNC: 4.1 MG/DL (ref 4.4–5.2)
CA-I BLD-MCNC: 4.3 MG/DL (ref 4.4–5.2)
CA-I BLD-MCNC: 4.3 MG/DL (ref 4.4–5.2)
CA-I BLD-MCNC: 4.5 MG/DL (ref 4.4–5.2)
CA-I BLD-MCNC: 4.6 MG/DL (ref 4.4–5.2)
CA-I BLD-MCNC: 4.6 MG/DL (ref 4.4–5.2)
CALCIUM SERPL-MCNC: 8.5 MG/DL (ref 8.8–10.4)
CALCIUM SERPL-MCNC: 8.6 MG/DL (ref 8.8–10.4)
CHLORIDE SERPL-SCNC: 103 MMOL/L (ref 98–107)
CHLORIDE SERPL-SCNC: 106 MMOL/L (ref 98–107)
CLOT INIT KAOL IND TO POST HEP NEUT TRTO: 1 {RATIO}
CLOT INIT KAOL IND TO POST HEP NEUT TRTO: 1 {RATIO}
CLOT INIT KAOLIN IND BLD US: 102 SEC (ref 113–166)
CLOT INIT KAOLIN IND BLD US: 131 SEC (ref 113–166)
CLOT INIT KAOLIN IND P HEP NEUT BLD US: 107 SEC (ref 103–153)
CLOT INIT KAOLIN IND P HEP NEUT BLD US: 132 SEC (ref 103–153)
CLOT STIFF PLT CONT BLD CALC: 15.9 HPA (ref 11.9–29.8)
CLOT STIFF PLT CONT BLD CALC: 17.6 HPA (ref 11.9–29.8)
CLOT STIFF TF IND P HEP NEUT BLD US: 17.7 HPA (ref 13–33.2)
CLOT STIFF TF IND P HEP NEUT BLD US: 20 HPA (ref 13–33.2)
CLOT STIFF TF IND+IIB-IIIA INH P HEP NEU: 1.8 HPA (ref 1–3.7)
CLOT STIFF TF IND+IIB-IIIA INH P HEP NEU: 2.4 HPA (ref 1–3.7)
CODING SYSTEM: NORMAL
CODING SYSTEM: NORMAL
CREAT SERPL-MCNC: 0.77 MG/DL (ref 0.67–1.17)
CREAT SERPL-MCNC: 0.89 MG/DL (ref 0.67–1.17)
CROSSMATCH: NORMAL
CROSSMATCH: NORMAL
DIASTOLIC BLOOD PRESSURE - MUSE: NORMAL MMHG
DIASTOLIC BLOOD PRESSURE - MUSE: NORMAL MMHG
EGFRCR SERPLBLD CKD-EPI 2021: >90 ML/MIN/1.73M2
EGFRCR SERPLBLD CKD-EPI 2021: >90 ML/MIN/1.73M2
ERYTHROCYTE [DISTWIDTH] IN BLOOD BY AUTOMATED COUNT: 12.6 % (ref 10–15)
ERYTHROCYTE [DISTWIDTH] IN BLOOD BY AUTOMATED COUNT: 12.7 % (ref 10–15)
FIBRINOGEN PPP-MCNC: 264 MG/DL (ref 170–510)
GLUCOSE BLD-MCNC: 101 MG/DL (ref 70–99)
GLUCOSE BLD-MCNC: 107 MG/DL (ref 70–99)
GLUCOSE BLD-MCNC: 108 MG/DL (ref 70–99)
GLUCOSE BLD-MCNC: 140 MG/DL (ref 70–99)
GLUCOSE BLD-MCNC: 155 MG/DL (ref 70–99)
GLUCOSE BLD-MCNC: 168 MG/DL (ref 70–99)
GLUCOSE BLD-MCNC: 191 MG/DL (ref 70–99)
GLUCOSE BLD-MCNC: 91 MG/DL (ref 70–99)
GLUCOSE BLD-MCNC: 97 MG/DL (ref 70–99)
GLUCOSE BLDC GLUCOMTR-MCNC: 142 MG/DL (ref 70–99)
GLUCOSE BLDC GLUCOMTR-MCNC: 158 MG/DL (ref 70–99)
GLUCOSE BLDC GLUCOMTR-MCNC: 190 MG/DL (ref 70–99)
GLUCOSE BLDC GLUCOMTR-MCNC: 90 MG/DL (ref 70–99)
GLUCOSE SERPL-MCNC: 155 MG/DL (ref 70–99)
GLUCOSE SERPL-MCNC: 160 MG/DL (ref 70–99)
HCO3 BLD-SCNC: 26 MMOL/L (ref 21–28)
HCO3 BLD-SCNC: 26 MMOL/L (ref 21–28)
HCO3 BLDA-SCNC: 23 MMOL/L (ref 21–28)
HCO3 BLDA-SCNC: 25 MMOL/L (ref 21–28)
HCO3 BLDA-SCNC: 25 MMOL/L (ref 21–28)
HCO3 BLDA-SCNC: 26 MMOL/L (ref 21–28)
HCO3 BLDA-SCNC: 26 MMOL/L (ref 21–28)
HCO3 BLDV-SCNC: 23 MMOL/L (ref 21–28)
HCO3 BLDV-SCNC: 26 MMOL/L (ref 21–28)
HCO3 BLDV-SCNC: 27 MMOL/L (ref 21–28)
HCO3 BLDV-SCNC: 27 MMOL/L (ref 21–28)
HCO3 SERPL-SCNC: 23 MMOL/L (ref 22–29)
HCO3 SERPL-SCNC: 23 MMOL/L (ref 22–29)
HCT VFR BLD AUTO: 36.8 % (ref 40–53)
HCT VFR BLD AUTO: 39 % (ref 40–53)
HGB BLD-MCNC: 10.6 G/DL (ref 13.3–17.7)
HGB BLD-MCNC: 10.8 G/DL (ref 13.3–17.7)
HGB BLD-MCNC: 12.1 G/DL (ref 13.3–17.7)
HGB BLD-MCNC: 12.6 G/DL (ref 13.3–17.7)
HGB BLD-MCNC: 12.7 G/DL (ref 13.3–17.7)
HGB BLD-MCNC: 12.8 G/DL (ref 13.3–17.7)
HGB BLD-MCNC: 13.3 G/DL (ref 13.3–17.7)
HGB BLD-MCNC: 13.4 G/DL (ref 13.3–17.7)
HGB BLD-MCNC: 13.9 G/DL (ref 13.3–17.7)
HGB BLD-MCNC: 13.9 G/DL (ref 13.3–17.7)
HGB BLD-MCNC: 14.3 G/DL (ref 13.3–17.7)
INR PPP: 1.33 (ref 0.85–1.15)
INR PPP: 1.33 (ref 0.85–1.15)
INTERPRETATION ECG - MUSE: NORMAL
INTERPRETATION ECG - MUSE: NORMAL
LACTATE BLD-SCNC: 0.8 MMOL/L (ref 0.7–2)
LACTATE BLD-SCNC: 1.1 MMOL/L (ref 0.7–2)
LACTATE BLD-SCNC: 1.6 MMOL/L (ref 0.7–2)
LACTATE BLD-SCNC: 1.7 MMOL/L (ref 0.7–2)
LACTATE SERPL-SCNC: 0.8 MMOL/L (ref 0.7–2)
LACTATE SERPL-SCNC: 1.1 MMOL/L (ref 0.7–2)
LACTATE SERPL-SCNC: 1.4 MMOL/L (ref 0.7–2)
MAGNESIUM SERPL-MCNC: 3.1 MG/DL (ref 1.7–2.3)
MCH RBC QN AUTO: 30.7 PG (ref 26.5–33)
MCH RBC QN AUTO: 30.8 PG (ref 26.5–33)
MCHC RBC AUTO-ENTMCNC: 34.1 G/DL (ref 31.5–36.5)
MCHC RBC AUTO-ENTMCNC: 34.5 G/DL (ref 31.5–36.5)
MCV RBC AUTO: 89 FL (ref 78–100)
MCV RBC AUTO: 90 FL (ref 78–100)
O2/TOTAL GAS SETTING VFR VENT: 100 %
O2/TOTAL GAS SETTING VFR VENT: 21 %
O2/TOTAL GAS SETTING VFR VENT: 21 %
O2/TOTAL GAS SETTING VFR VENT: 40 %
O2/TOTAL GAS SETTING VFR VENT: 40 %
O2/TOTAL GAS SETTING VFR VENT: 80 %
OXYHGB MFR BLDA: 95 % (ref 92–100)
OXYHGB MFR BLDA: 97 % (ref 92–100)
OXYHGB MFR BLDA: 98 % (ref 92–100)
OXYHGB MFR BLDA: 99 % (ref 92–100)
OXYHGB MFR BLDV: 74 % (ref 70–75)
OXYHGB MFR BLDV: 74 % (ref 70–75)
OXYHGB MFR BLDV: 81 % (ref 70–75)
OXYHGB MFR BLDV: 95 % (ref 70–75)
P AXIS - MUSE: 19 DEGREES
P AXIS - MUSE: 42 DEGREES
PCO2 BLD: 44 MM HG (ref 35–45)
PCO2 BLD: 45 MM HG (ref 35–45)
PCO2 BLDA: 33 MM HG (ref 35–45)
PCO2 BLDA: 35 MM HG (ref 35–45)
PCO2 BLDA: 38 MM HG (ref 35–45)
PCO2 BLDA: 44 MM HG (ref 35–45)
PCO2 BLDA: 45 MM HG (ref 35–45)
PCO2 BLDV: 38 MM HG (ref 40–50)
PCO2 BLDV: 40 MM HG (ref 40–50)
PCO2 BLDV: 47 MM HG (ref 40–50)
PCO2 BLDV: 50 MM HG (ref 40–50)
PH BLD: 7.36 [PH] (ref 7.35–7.45)
PH BLD: 7.38 [PH] (ref 7.35–7.45)
PH BLDA: 7.31 [PH] (ref 7.35–7.45)
PH BLDA: 7.35 [PH] (ref 7.35–7.45)
PH BLDA: 7.35 [PH] (ref 7.35–7.45)
PH BLDA: 7.39 [PH] (ref 7.35–7.45)
PH BLDA: 7.44 [PH] (ref 7.35–7.45)
PH BLDA: 7.44 [PH] (ref 7.35–7.45)
PH BLDA: 7.46 [PH] (ref 7.35–7.45)
PH BLDV: 7.34 [PH] (ref 7.32–7.43)
PH BLDV: 7.36 [PH] (ref 7.32–7.43)
PH BLDV: 7.4 [PH] (ref 7.32–7.43)
PH BLDV: 7.43 [PH] (ref 7.32–7.43)
PHOSPHATE SERPL-MCNC: 3.9 MG/DL (ref 2.5–4.5)
PLATELET # BLD AUTO: 170 10E3/UL (ref 150–450)
PLATELET # BLD AUTO: 173 10E3/UL (ref 150–450)
PLATELET # BLD AUTO: 194 10E3/UL (ref 150–450)
PO2 BLD: 103 MM HG (ref 80–105)
PO2 BLD: 83 MM HG (ref 80–105)
PO2 BLDA: 307 MM HG (ref 80–105)
PO2 BLDA: 318 MM HG (ref 80–105)
PO2 BLDA: 357 MM HG (ref 80–105)
PO2 BLDA: 371 MM HG (ref 80–105)
PO2 BLDA: 396 MM HG (ref 80–105)
PO2 BLDA: 399 MM HG (ref 80–105)
PO2 BLDA: 448 MM HG (ref 80–105)
PO2 BLDV: 42 MM HG (ref 25–47)
PO2 BLDV: 43 MM HG (ref 25–47)
PO2 BLDV: 47 MM HG (ref 25–47)
PO2 BLDV: 88 MM HG (ref 25–47)
POTASSIUM BLD-SCNC: 3.6 MMOL/L (ref 3.4–5.3)
POTASSIUM BLD-SCNC: 3.9 MMOL/L (ref 3.4–5.3)
POTASSIUM BLD-SCNC: 3.9 MMOL/L (ref 3.4–5.3)
POTASSIUM BLD-SCNC: 4 MMOL/L (ref 3.4–5.3)
POTASSIUM BLD-SCNC: 4 MMOL/L (ref 3.4–5.3)
POTASSIUM BLD-SCNC: 4.5 MMOL/L (ref 3.4–5.3)
POTASSIUM BLD-SCNC: 4.6 MMOL/L (ref 3.4–5.3)
POTASSIUM BLD-SCNC: 4.8 MMOL/L (ref 3.4–5.3)
POTASSIUM BLD-SCNC: 5.2 MMOL/L (ref 3.4–5.3)
POTASSIUM SERPL-SCNC: 4.2 MMOL/L (ref 3.4–5.3)
POTASSIUM SERPL-SCNC: 4.6 MMOL/L (ref 3.4–5.3)
PR INTERVAL - MUSE: 148 MS
PR INTERVAL - MUSE: 152 MS
PROT SERPL-MCNC: 5.6 G/DL (ref 6.4–8.3)
PROT SERPL-MCNC: 6.3 G/DL (ref 6.4–8.3)
PROTHROMBIN TIME: 16.4 SECONDS (ref 11.8–14.8)
PROTHROMBIN TIME: 16.8 SECONDS (ref 11.8–14.8)
QRS DURATION - MUSE: 80 MS
QRS DURATION - MUSE: 90 MS
QT - MUSE: 360 MS
QT - MUSE: 382 MS
QTC - MUSE: 423 MS
QTC - MUSE: 462 MS
R AXIS - MUSE: -12 DEGREES
R AXIS - MUSE: 12 DEGREES
RBC # BLD AUTO: 4.14 10E6/UL (ref 4.4–5.9)
RBC # BLD AUTO: 4.32 10E6/UL (ref 4.4–5.9)
SAO2 % BLDA: 100 % (ref 96–97)
SAO2 % BLDA: 96.4 % (ref 96–97)
SAO2 % BLDA: 98.4 % (ref 96–97)
SAO2 % BLDV: 75 % (ref 70–75)
SAO2 % BLDV: 76 % (ref 70–75)
SAO2 % BLDV: 83 % (ref 70–75)
SAO2 % BLDV: 98 % (ref 70–75)
SODIUM BLD-SCNC: 137 MMOL/L (ref 135–145)
SODIUM BLD-SCNC: 138 MMOL/L (ref 135–145)
SODIUM BLD-SCNC: 139 MMOL/L (ref 135–145)
SODIUM BLD-SCNC: 140 MMOL/L (ref 135–145)
SODIUM BLD-SCNC: 142 MMOL/L (ref 135–145)
SODIUM BLD-SCNC: 142 MMOL/L (ref 135–145)
SODIUM SERPL-SCNC: 137 MMOL/L (ref 135–145)
SODIUM SERPL-SCNC: 141 MMOL/L (ref 135–145)
SPECIMEN EXP DATE BLD: NORMAL
SYSTOLIC BLOOD PRESSURE - MUSE: NORMAL MMHG
SYSTOLIC BLOOD PRESSURE - MUSE: NORMAL MMHG
T AXIS - MUSE: -5 DEGREES
T AXIS - MUSE: -8 DEGREES
UNIT ABO/RH: NORMAL
UNIT ABO/RH: NORMAL
UNIT NUMBER: NORMAL
UNIT NUMBER: NORMAL
UNIT STATUS: NORMAL
UNIT STATUS: NORMAL
UNIT TYPE ISBT: 8400
UNIT TYPE ISBT: 8400
VENTRICULAR RATE- MUSE: 83 BPM
VENTRICULAR RATE- MUSE: 88 BPM
WBC # BLD AUTO: 10.1 10E3/UL (ref 4–11)
WBC # BLD AUTO: 11.6 10E3/UL (ref 4–11)

## 2025-04-30 PROCEDURE — 250N000011 HC RX IP 250 OP 636: Performed by: SURGERY

## 2025-04-30 PROCEDURE — 250N000013 HC RX MED GY IP 250 OP 250 PS 637

## 2025-04-30 PROCEDURE — 250N000011 HC RX IP 250 OP 636: Performed by: STUDENT IN AN ORGANIZED HEALTH CARE EDUCATION/TRAINING PROGRAM

## 2025-04-30 PROCEDURE — 02U90JZ SUPPLEMENT CHORDAE TENDINEAE WITH SYNTHETIC SUBSTITUTE, OPEN APPROACH: ICD-10-PCS | Performed by: SURGERY

## 2025-04-30 PROCEDURE — 85610 PROTHROMBIN TIME: CPT | Performed by: SURGERY

## 2025-04-30 PROCEDURE — 84155 ASSAY OF PROTEIN SERUM: CPT

## 2025-04-30 PROCEDURE — 272N000088 HC PUMP APP ADULT PERFUSION: Performed by: SURGERY

## 2025-04-30 PROCEDURE — 82805 BLOOD GASES W/O2 SATURATION: CPT | Performed by: STUDENT IN AN ORGANIZED HEALTH CARE EDUCATION/TRAINING PROGRAM

## 2025-04-30 PROCEDURE — 02UG0JZ SUPPLEMENT MITRAL VALVE WITH SYNTHETIC SUBSTITUTE, OPEN APPROACH: ICD-10-PCS | Performed by: SURGERY

## 2025-04-30 PROCEDURE — 85384 FIBRINOGEN ACTIVITY: CPT | Performed by: SURGERY

## 2025-04-30 PROCEDURE — 250N000013 HC RX MED GY IP 250 OP 250 PS 637: Performed by: PHYSICIAN ASSISTANT

## 2025-04-30 PROCEDURE — 93005 ELECTROCARDIOGRAM TRACING: CPT

## 2025-04-30 PROCEDURE — 82330 ASSAY OF CALCIUM: CPT

## 2025-04-30 PROCEDURE — 250N000011 HC RX IP 250 OP 636

## 2025-04-30 PROCEDURE — 02QG0ZZ REPAIR MITRAL VALVE, OPEN APPROACH: ICD-10-PCS | Performed by: SURGERY

## 2025-04-30 PROCEDURE — 85396 CLOTTING ASSAY WHOLE BLOOD: CPT

## 2025-04-30 PROCEDURE — 86923 COMPATIBILITY TEST ELECTRIC: CPT | Performed by: SURGERY

## 2025-04-30 PROCEDURE — 99291 CRITICAL CARE FIRST HOUR: CPT | Mod: GC | Performed by: SURGERY

## 2025-04-30 PROCEDURE — 370N000017 HC ANESTHESIA TECHNICAL FEE, PER MIN: Performed by: SURGERY

## 2025-04-30 PROCEDURE — P9045 ALBUMIN (HUMAN), 5%, 250 ML: HCPCS

## 2025-04-30 PROCEDURE — 272N000001 HC OR GENERAL SUPPLY STERILE: Performed by: SURGERY

## 2025-04-30 PROCEDURE — 999N000065 XR CHEST PORT 1 VIEW

## 2025-04-30 PROCEDURE — 82947 ASSAY GLUCOSE BLOOD QUANT: CPT | Performed by: STUDENT IN AN ORGANIZED HEALTH CARE EDUCATION/TRAINING PROGRAM

## 2025-04-30 PROCEDURE — 82805 BLOOD GASES W/O2 SATURATION: CPT

## 2025-04-30 PROCEDURE — 250N000013 HC RX MED GY IP 250 OP 250 PS 637: Performed by: STUDENT IN AN ORGANIZED HEALTH CARE EDUCATION/TRAINING PROGRAM

## 2025-04-30 PROCEDURE — C1889 IMPLANT/INSERT DEVICE, NOC: HCPCS | Performed by: SURGERY

## 2025-04-30 PROCEDURE — 250N000012 HC RX MED GY IP 250 OP 636 PS 637: Performed by: STUDENT IN AN ORGANIZED HEALTH CARE EDUCATION/TRAINING PROGRAM

## 2025-04-30 PROCEDURE — 85018 HEMOGLOBIN: CPT

## 2025-04-30 PROCEDURE — 999N000141 HC STATISTIC PRE-PROCEDURE NURSING ASSESSMENT: Performed by: SURGERY

## 2025-04-30 PROCEDURE — 250N000011 HC RX IP 250 OP 636: Mod: JZ

## 2025-04-30 PROCEDURE — 5A1221Z PERFORMANCE OF CARDIAC OUTPUT, CONTINUOUS: ICD-10-PCS | Performed by: SURGERY

## 2025-04-30 PROCEDURE — 74018 RADEX ABDOMEN 1 VIEW: CPT | Mod: 26 | Performed by: STUDENT IN AN ORGANIZED HEALTH CARE EDUCATION/TRAINING PROGRAM

## 2025-04-30 PROCEDURE — 83605 ASSAY OF LACTIC ACID: CPT | Performed by: STUDENT IN AN ORGANIZED HEALTH CARE EDUCATION/TRAINING PROGRAM

## 2025-04-30 PROCEDURE — 272N000002 HC OR SUPPLY OTHER OPNP: Performed by: SURGERY

## 2025-04-30 PROCEDURE — 83605 ASSAY OF LACTIC ACID: CPT

## 2025-04-30 PROCEDURE — 82947 ASSAY GLUCOSE BLOOD QUANT: CPT

## 2025-04-30 PROCEDURE — 250N000013 HC RX MED GY IP 250 OP 250 PS 637: Performed by: SURGERY

## 2025-04-30 PROCEDURE — 410N000003 HC PER-PERFUSION 1ST 30 MIN: Performed by: SURGERY

## 2025-04-30 PROCEDURE — 82810 BLOOD GASES O2 SAT ONLY: CPT

## 2025-04-30 PROCEDURE — 02HQ32Z INSERTION OF MONITORING DEVICE INTO RIGHT PULMONARY ARTERY, PERCUTANEOUS APPROACH: ICD-10-PCS | Performed by: SURGERY

## 2025-04-30 PROCEDURE — 85610 PROTHROMBIN TIME: CPT | Performed by: STUDENT IN AN ORGANIZED HEALTH CARE EDUCATION/TRAINING PROGRAM

## 2025-04-30 PROCEDURE — 85049 AUTOMATED PLATELET COUNT: CPT | Performed by: SURGERY

## 2025-04-30 PROCEDURE — 250N000009 HC RX 250: Performed by: STUDENT IN AN ORGANIZED HEALTH CARE EDUCATION/TRAINING PROGRAM

## 2025-04-30 PROCEDURE — 271N000002 HC RX 271

## 2025-04-30 PROCEDURE — 360N000078 HC SURGERY LEVEL 5, PER MIN: Performed by: SURGERY

## 2025-04-30 PROCEDURE — 250N000009 HC RX 250: Performed by: SURGERY

## 2025-04-30 PROCEDURE — 250N000009 HC RX 250

## 2025-04-30 PROCEDURE — 999N000065 XR ABDOMEN 1 VIEW

## 2025-04-30 PROCEDURE — 85730 THROMBOPLASTIN TIME PARTIAL: CPT | Performed by: STUDENT IN AN ORGANIZED HEALTH CARE EDUCATION/TRAINING PROGRAM

## 2025-04-30 PROCEDURE — 4A1239Z MONITORING OF CARDIAC OUTPUT, PERCUTANEOUS APPROACH: ICD-10-PCS | Performed by: SURGERY

## 2025-04-30 PROCEDURE — 258N000003 HC RX IP 258 OP 636: Performed by: SURGERY

## 2025-04-30 PROCEDURE — 999N000253 HC STATISTIC WEANING TRIALS

## 2025-04-30 PROCEDURE — 83735 ASSAY OF MAGNESIUM: CPT | Performed by: STUDENT IN AN ORGANIZED HEALTH CARE EDUCATION/TRAINING PROGRAM

## 2025-04-30 PROCEDURE — 94002 VENT MGMT INPAT INIT DAY: CPT

## 2025-04-30 PROCEDURE — C1898 LEAD, PMKR, OTHER THAN TRANS: HCPCS | Performed by: SURGERY

## 2025-04-30 PROCEDURE — 84100 ASSAY OF PHOSPHORUS: CPT | Performed by: STUDENT IN AN ORGANIZED HEALTH CARE EDUCATION/TRAINING PROGRAM

## 2025-04-30 PROCEDURE — 258N000003 HC RX IP 258 OP 636: Performed by: STUDENT IN AN ORGANIZED HEALTH CARE EDUCATION/TRAINING PROGRAM

## 2025-04-30 PROCEDURE — 410N000004: Performed by: SURGERY

## 2025-04-30 PROCEDURE — 36415 COLL VENOUS BLD VENIPUNCTURE: CPT | Performed by: SURGERY

## 2025-04-30 PROCEDURE — 82330 ASSAY OF CALCIUM: CPT | Performed by: STUDENT IN AN ORGANIZED HEALTH CARE EDUCATION/TRAINING PROGRAM

## 2025-04-30 PROCEDURE — 85730 THROMBOPLASTIN TIME PARTIAL: CPT | Performed by: SURGERY

## 2025-04-30 PROCEDURE — 250N000011 HC RX IP 250 OP 636: Mod: JZ | Performed by: STUDENT IN AN ORGANIZED HEALTH CARE EDUCATION/TRAINING PROGRAM

## 2025-04-30 PROCEDURE — 250N000024 HC ISOFLURANE, PER MIN: Performed by: SURGERY

## 2025-04-30 PROCEDURE — 999N000259 HC STATISTIC EXTUBATION

## 2025-04-30 PROCEDURE — 120N000002 HC R&B MED SURG/OB UMMC

## 2025-04-30 PROCEDURE — 33426 REPAIR OF MITRAL VALVE: CPT | Mod: GC | Performed by: SURGERY

## 2025-04-30 PROCEDURE — 71045 X-RAY EXAM CHEST 1 VIEW: CPT | Mod: 26 | Performed by: RADIOLOGY

## 2025-04-30 PROCEDURE — 272N000085 HC PACK CELL SAVER CSP: Performed by: SURGERY

## 2025-04-30 PROCEDURE — 86900 BLOOD TYPING SEROLOGIC ABO: CPT | Performed by: SURGERY

## 2025-04-30 PROCEDURE — 250N000011 HC RX IP 250 OP 636: Mod: JZ | Performed by: SURGERY

## 2025-04-30 PROCEDURE — 4A133B3 MONITORING OF ARTERIAL PRESSURE, PULMONARY, PERCUTANEOUS APPROACH: ICD-10-PCS | Performed by: SURGERY

## 2025-04-30 PROCEDURE — 999N000157 HC STATISTIC RCP TIME EA 10 MIN

## 2025-04-30 PROCEDURE — 85014 HEMATOCRIT: CPT | Performed by: STUDENT IN AN ORGANIZED HEALTH CARE EDUCATION/TRAINING PROGRAM

## 2025-04-30 DEVICE — IMPLANTABLE DEVICE
Type: IMPLANTABLE DEVICE | Site: HEART | Status: FUNCTIONAL
Brand: CARPENTIER-EDWARDS PHYSIO II ANNULOPLASTY RING

## 2025-04-30 RX ORDER — NALOXONE HYDROCHLORIDE 0.4 MG/ML
0.4 INJECTION, SOLUTION INTRAMUSCULAR; INTRAVENOUS; SUBCUTANEOUS
Status: DISCONTINUED | OUTPATIENT
Start: 2025-04-30 | End: 2025-05-07 | Stop reason: HOSPADM

## 2025-04-30 RX ORDER — METHOCARBAMOL 500 MG/1
500 TABLET, FILM COATED ORAL 4 TIMES DAILY PRN
Status: DISCONTINUED | OUTPATIENT
Start: 2025-04-30 | End: 2025-05-01

## 2025-04-30 RX ORDER — CHLORHEXIDINE GLUCONATE ORAL RINSE 1.2 MG/ML
15 SOLUTION DENTAL EVERY 12 HOURS
Status: DISCONTINUED | OUTPATIENT
Start: 2025-04-30 | End: 2025-05-01

## 2025-04-30 RX ORDER — HEPARIN SOD,PORCINE/0.9 % NACL 30K/1000ML
INTRAVENOUS SOLUTION INTRAVENOUS
Status: DISCONTINUED | OUTPATIENT
Start: 2025-04-30 | End: 2025-04-30 | Stop reason: HOSPADM

## 2025-04-30 RX ORDER — SODIUM CHLORIDE, SODIUM LACTATE, POTASSIUM CHLORIDE, CALCIUM CHLORIDE 600; 310; 30; 20 MG/100ML; MG/100ML; MG/100ML; MG/100ML
INJECTION, SOLUTION INTRAVENOUS CONTINUOUS PRN
Status: DISCONTINUED | OUTPATIENT
Start: 2025-04-30 | End: 2025-04-30

## 2025-04-30 RX ORDER — LIDOCAINE 40 MG/G
CREAM TOPICAL
Status: DISCONTINUED | OUTPATIENT
Start: 2025-04-30 | End: 2025-04-30 | Stop reason: HOSPADM

## 2025-04-30 RX ORDER — HYDROMORPHONE HYDROCHLORIDE 4 MG/1
4 TABLET ORAL EVERY 4 HOURS PRN
Status: DISCONTINUED | OUTPATIENT
Start: 2025-04-30 | End: 2025-04-30

## 2025-04-30 RX ORDER — DEXAMETHASONE SODIUM PHOSPHATE 4 MG/ML
INJECTION, SOLUTION INTRA-ARTICULAR; INTRALESIONAL; INTRAMUSCULAR; INTRAVENOUS; SOFT TISSUE PRN
Status: DISCONTINUED | OUTPATIENT
Start: 2025-04-30 | End: 2025-04-30

## 2025-04-30 RX ORDER — PROPOFOL 10 MG/ML
INJECTION, EMULSION INTRAVENOUS PRN
Status: DISCONTINUED | OUTPATIENT
Start: 2025-04-30 | End: 2025-04-30

## 2025-04-30 RX ORDER — HYDRALAZINE HYDROCHLORIDE 20 MG/ML
10 INJECTION INTRAMUSCULAR; INTRAVENOUS EVERY 30 MIN PRN
Status: DISCONTINUED | OUTPATIENT
Start: 2025-04-30 | End: 2025-05-03

## 2025-04-30 RX ORDER — CEFAZOLIN SODIUM/WATER 2 G/20 ML
SYRINGE (ML) INTRAVENOUS PRN
Status: DISCONTINUED | OUTPATIENT
Start: 2025-04-30 | End: 2025-04-30

## 2025-04-30 RX ORDER — LIDOCAINE 40 MG/G
CREAM TOPICAL
Status: DISCONTINUED | OUTPATIENT
Start: 2025-04-30 | End: 2025-05-07 | Stop reason: HOSPADM

## 2025-04-30 RX ORDER — GABAPENTIN 400 MG/1
750 CAPSULE ORAL 4 TIMES DAILY PRN
Status: DISCONTINUED | OUTPATIENT
Start: 2025-04-30 | End: 2025-04-30

## 2025-04-30 RX ORDER — PHENYLEPHRINE HCL IN 0.9% NACL 50MG/250ML
.1-6 PLASTIC BAG, INJECTION (ML) INTRAVENOUS CONTINUOUS
Status: DISCONTINUED | OUTPATIENT
Start: 2025-04-30 | End: 2025-04-30 | Stop reason: HOSPADM

## 2025-04-30 RX ORDER — PROTAMINE SULFATE 10 MG/ML
INJECTION, SOLUTION INTRAVENOUS PRN
Status: DISCONTINUED | OUTPATIENT
Start: 2025-04-30 | End: 2025-04-30

## 2025-04-30 RX ORDER — DEXMEDETOMIDINE HYDROCHLORIDE 4 UG/ML
.1-1.2 INJECTION, SOLUTION INTRAVENOUS CONTINUOUS
Status: DISCONTINUED | OUTPATIENT
Start: 2025-04-30 | End: 2025-05-01

## 2025-04-30 RX ORDER — NALOXONE HYDROCHLORIDE 0.4 MG/ML
0.4 INJECTION, SOLUTION INTRAMUSCULAR; INTRAVENOUS; SUBCUTANEOUS
Status: DISCONTINUED | OUTPATIENT
Start: 2025-04-30 | End: 2025-04-30 | Stop reason: HOSPADM

## 2025-04-30 RX ORDER — NICOTINE POLACRILEX 4 MG
15-30 LOZENGE BUCCAL
Status: DISCONTINUED | OUTPATIENT
Start: 2025-04-30 | End: 2025-05-07 | Stop reason: HOSPADM

## 2025-04-30 RX ORDER — LIDOCAINE HYDROCHLORIDE 20 MG/ML
INJECTION, SOLUTION INFILTRATION; PERINEURAL PRN
Status: DISCONTINUED | OUTPATIENT
Start: 2025-04-30 | End: 2025-04-30

## 2025-04-30 RX ORDER — POLYETHYLENE GLYCOL 3350 17 G/17G
17 POWDER, FOR SOLUTION ORAL DAILY
Status: DISCONTINUED | OUTPATIENT
Start: 2025-05-01 | End: 2025-05-05

## 2025-04-30 RX ORDER — ACETAMINOPHEN 325 MG/1
975 TABLET ORAL ONCE
Status: COMPLETED | OUTPATIENT
Start: 2025-04-30 | End: 2025-04-30

## 2025-04-30 RX ORDER — CALCIUM GLUCONATE 20 MG/ML
2 INJECTION, SOLUTION INTRAVENOUS
Status: DISCONTINUED | OUTPATIENT
Start: 2025-04-30 | End: 2025-05-02

## 2025-04-30 RX ORDER — CALCIUM GLUCONATE 20 MG/ML
1 INJECTION, SOLUTION INTRAVENOUS
Status: DISCONTINUED | OUTPATIENT
Start: 2025-04-30 | End: 2025-05-02

## 2025-04-30 RX ORDER — EPINEPHRINE IN 0.9 % SOD CHLOR 5 MG/250ML
.01-.3 PLASTIC BAG, INJECTION (ML) INTRAVENOUS CONTINUOUS
Status: DISCONTINUED | OUTPATIENT
Start: 2025-04-30 | End: 2025-04-30 | Stop reason: HOSPADM

## 2025-04-30 RX ORDER — HYDROMORPHONE HCL IN WATER/PF 6 MG/30 ML
0.2 PATIENT CONTROLLED ANALGESIA SYRINGE INTRAVENOUS
Status: DISCONTINUED | OUTPATIENT
Start: 2025-04-30 | End: 2025-05-06

## 2025-04-30 RX ORDER — DEXTROSE MONOHYDRATE 25 G/50ML
25-50 INJECTION, SOLUTION INTRAVENOUS
Status: DISCONTINUED | OUTPATIENT
Start: 2025-04-30 | End: 2025-05-07 | Stop reason: HOSPADM

## 2025-04-30 RX ORDER — NOREPINEPHRINE BITARTRATE 0.06 MG/ML
.01-.15 INJECTION, SOLUTION INTRAVENOUS CONTINUOUS
Status: CANCELLED | OUTPATIENT
Start: 2025-04-30

## 2025-04-30 RX ORDER — ACETAMINOPHEN 325 MG/1
975 TABLET ORAL EVERY 8 HOURS
Status: DISCONTINUED | OUTPATIENT
Start: 2025-04-30 | End: 2025-05-06

## 2025-04-30 RX ORDER — ONDANSETRON 4 MG/1
4 TABLET, ORALLY DISINTEGRATING ORAL EVERY 6 HOURS PRN
Status: DISCONTINUED | OUTPATIENT
Start: 2025-04-30 | End: 2025-05-07 | Stop reason: HOSPADM

## 2025-04-30 RX ORDER — ONDANSETRON 2 MG/ML
4 INJECTION INTRAMUSCULAR; INTRAVENOUS EVERY 6 HOURS PRN
Status: DISCONTINUED | OUTPATIENT
Start: 2025-04-30 | End: 2025-05-07 | Stop reason: HOSPADM

## 2025-04-30 RX ORDER — OXYCODONE HYDROCHLORIDE 10 MG/1
10 TABLET ORAL EVERY 4 HOURS PRN
Status: DISCONTINUED | OUTPATIENT
Start: 2025-04-30 | End: 2025-04-30

## 2025-04-30 RX ORDER — EPINEPHRINE IN 0.9 % SOD CHLOR 5 MG/250ML
.01-.1 PLASTIC BAG, INJECTION (ML) INTRAVENOUS CONTINUOUS
Status: DISCONTINUED | OUTPATIENT
Start: 2025-04-30 | End: 2025-04-30

## 2025-04-30 RX ORDER — GABAPENTIN 300 MG/1
300 CAPSULE ORAL
Status: COMPLETED | OUTPATIENT
Start: 2025-04-30 | End: 2025-04-30

## 2025-04-30 RX ORDER — CLINDAMYCIN PHOSPHATE 900 MG/50ML
900 INJECTION, SOLUTION INTRAVENOUS
Status: COMPLETED | OUTPATIENT
Start: 2025-04-30 | End: 2025-04-30

## 2025-04-30 RX ORDER — HYDROMORPHONE HCL IN WATER/PF 6 MG/30 ML
0.4 PATIENT CONTROLLED ANALGESIA SYRINGE INTRAVENOUS
Status: DISCONTINUED | OUTPATIENT
Start: 2025-04-30 | End: 2025-05-06

## 2025-04-30 RX ORDER — BUPIVACAINE HYDROCHLORIDE 2.5 MG/ML
INJECTION, SOLUTION EPIDURAL; INFILTRATION; INTRACAUDAL; PERINEURAL PRN
Status: DISCONTINUED | OUTPATIENT
Start: 2025-04-30 | End: 2025-04-30

## 2025-04-30 RX ORDER — NOREPINEPHRINE BITARTRATE 0.06 MG/ML
.01-.6 INJECTION, SOLUTION INTRAVENOUS CONTINUOUS
Status: DISCONTINUED | OUTPATIENT
Start: 2025-04-30 | End: 2025-04-30 | Stop reason: HOSPADM

## 2025-04-30 RX ORDER — GABAPENTIN 300 MG/1
300 CAPSULE ORAL ONCE
Status: COMPLETED | OUTPATIENT
Start: 2025-04-30 | End: 2025-04-30

## 2025-04-30 RX ORDER — NOREPINEPHRINE BITARTRATE 0.06 MG/ML
.01-.1 INJECTION, SOLUTION INTRAVENOUS CONTINUOUS
Status: DISCONTINUED | OUTPATIENT
Start: 2025-04-30 | End: 2025-04-30 | Stop reason: HOSPADM

## 2025-04-30 RX ORDER — PANTOPRAZOLE SODIUM 40 MG/1
40 TABLET, DELAYED RELEASE ORAL DAILY
Status: DISCONTINUED | OUTPATIENT
Start: 2025-04-30 | End: 2025-05-01

## 2025-04-30 RX ORDER — NITROGLYCERIN 10 MG/100ML
INJECTION INTRAVENOUS PRN
Status: DISCONTINUED | OUTPATIENT
Start: 2025-04-30 | End: 2025-04-30

## 2025-04-30 RX ORDER — ASPIRIN 81 MG/1
81 TABLET, CHEWABLE ORAL DAILY
Status: DISCONTINUED | OUTPATIENT
Start: 2025-05-01 | End: 2025-05-07 | Stop reason: HOSPADM

## 2025-04-30 RX ORDER — OXYCODONE HYDROCHLORIDE 5 MG/1
5 TABLET ORAL EVERY 4 HOURS PRN
Status: DISCONTINUED | OUTPATIENT
Start: 2025-04-30 | End: 2025-05-01

## 2025-04-30 RX ORDER — CHLORHEXIDINE GLUCONATE ORAL RINSE 1.2 MG/ML
10 SOLUTION DENTAL ONCE
Status: COMPLETED | OUTPATIENT
Start: 2025-04-30 | End: 2025-04-30

## 2025-04-30 RX ORDER — FAMOTIDINE 20 MG/1
20 TABLET, FILM COATED ORAL
Status: COMPLETED | OUTPATIENT
Start: 2025-04-30 | End: 2025-04-30

## 2025-04-30 RX ORDER — BISACODYL 10 MG
10 SUPPOSITORY, RECTAL RECTAL DAILY PRN
Status: DISCONTINUED | OUTPATIENT
Start: 2025-05-03 | End: 2025-05-07 | Stop reason: HOSPADM

## 2025-04-30 RX ORDER — HEPARIN SODIUM 1000 [USP'U]/ML
INJECTION, SOLUTION INTRAVENOUS; SUBCUTANEOUS PRN
Status: DISCONTINUED | OUTPATIENT
Start: 2025-04-30 | End: 2025-04-30

## 2025-04-30 RX ORDER — NALOXONE HYDROCHLORIDE 0.4 MG/ML
0.2 INJECTION, SOLUTION INTRAMUSCULAR; INTRAVENOUS; SUBCUTANEOUS
Status: DISCONTINUED | OUTPATIENT
Start: 2025-04-30 | End: 2025-05-07 | Stop reason: HOSPADM

## 2025-04-30 RX ORDER — FENTANYL CITRATE 50 UG/ML
INJECTION, SOLUTION INTRAMUSCULAR; INTRAVENOUS PRN
Status: DISCONTINUED | OUTPATIENT
Start: 2025-04-30 | End: 2025-04-30

## 2025-04-30 RX ORDER — CLINDAMYCIN PHOSPHATE 900 MG/50ML
900 INJECTION, SOLUTION INTRAVENOUS EVERY 8 HOURS
Status: COMPLETED | OUTPATIENT
Start: 2025-04-30 | End: 2025-05-01

## 2025-04-30 RX ORDER — HYDROMORPHONE HYDROCHLORIDE 4 MG/ML
INJECTION, SOLUTION INTRAMUSCULAR; INTRAVENOUS; SUBCUTANEOUS PRN
Status: DISCONTINUED | OUTPATIENT
Start: 2025-04-30 | End: 2025-04-30

## 2025-04-30 RX ORDER — AMOXICILLIN 250 MG
1 CAPSULE ORAL 2 TIMES DAILY
Status: DISCONTINUED | OUTPATIENT
Start: 2025-04-30 | End: 2025-05-01

## 2025-04-30 RX ORDER — HEPARIN SODIUM 5000 [USP'U]/.5ML
5000 INJECTION, SOLUTION INTRAVENOUS; SUBCUTANEOUS EVERY 8 HOURS
Status: DISCONTINUED | OUTPATIENT
Start: 2025-05-01 | End: 2025-05-06

## 2025-04-30 RX ORDER — NALOXONE HYDROCHLORIDE 0.4 MG/ML
0.2 INJECTION, SOLUTION INTRAMUSCULAR; INTRAVENOUS; SUBCUTANEOUS
Status: DISCONTINUED | OUTPATIENT
Start: 2025-04-30 | End: 2025-04-30 | Stop reason: HOSPADM

## 2025-04-30 RX ORDER — PROCHLORPERAZINE MALEATE 5 MG/1
10 TABLET ORAL EVERY 6 HOURS PRN
Status: DISCONTINUED | OUTPATIENT
Start: 2025-04-30 | End: 2025-05-02

## 2025-04-30 RX ORDER — OXYCODONE HYDROCHLORIDE 5 MG/1
5 TABLET ORAL EVERY 4 HOURS PRN
Status: DISCONTINUED | OUTPATIENT
Start: 2025-04-30 | End: 2025-04-30

## 2025-04-30 RX ORDER — OXYCODONE HYDROCHLORIDE 10 MG/1
10 TABLET ORAL EVERY 4 HOURS PRN
Status: DISCONTINUED | OUTPATIENT
Start: 2025-04-30 | End: 2025-05-01

## 2025-04-30 RX ORDER — EPINEPHRINE IN 0.9 % SOD CHLOR 5 MG/250ML
.01-.3 PLASTIC BAG, INJECTION (ML) INTRAVENOUS CONTINUOUS
Status: DISCONTINUED | OUTPATIENT
Start: 2025-04-30 | End: 2025-04-30

## 2025-04-30 RX ORDER — CLINDAMYCIN PHOSPHATE 900 MG/50ML
900 INJECTION, SOLUTION INTRAVENOUS SEE ADMIN INSTRUCTIONS
Status: DISCONTINUED | OUTPATIENT
Start: 2025-04-30 | End: 2025-04-30 | Stop reason: HOSPADM

## 2025-04-30 RX ORDER — FENTANYL CITRATE 50 UG/ML
25-50 INJECTION, SOLUTION INTRAMUSCULAR; INTRAVENOUS
Status: DISCONTINUED | OUTPATIENT
Start: 2025-04-30 | End: 2025-04-30 | Stop reason: HOSPADM

## 2025-04-30 RX ORDER — HYDROMORPHONE HYDROCHLORIDE 2 MG/1
2 TABLET ORAL EVERY 4 HOURS PRN
Status: DISCONTINUED | OUTPATIENT
Start: 2025-04-30 | End: 2025-04-30

## 2025-04-30 RX ORDER — FLUMAZENIL 0.1 MG/ML
0.2 INJECTION, SOLUTION INTRAVENOUS
Status: DISCONTINUED | OUTPATIENT
Start: 2025-04-30 | End: 2025-04-30 | Stop reason: HOSPADM

## 2025-04-30 RX ORDER — DEXMEDETOMIDINE HYDROCHLORIDE 4 UG/ML
.2-1.2 INJECTION, SOLUTION INTRAVENOUS CONTINUOUS
Status: DISCONTINUED | OUTPATIENT
Start: 2025-04-30 | End: 2025-04-30 | Stop reason: HOSPADM

## 2025-04-30 RX ORDER — DEXMEDETOMIDINE HYDROCHLORIDE 4 UG/ML
.2-.7 INJECTION, SOLUTION INTRAVENOUS CONTINUOUS
Status: DISCONTINUED | OUTPATIENT
Start: 2025-04-30 | End: 2025-04-30

## 2025-04-30 RX ORDER — SODIUM CHLORIDE, SODIUM GLUCONATE, SODIUM ACETATE, POTASSIUM CHLORIDE AND MAGNESIUM CHLORIDE 526; 502; 368; 37; 30 MG/100ML; MG/100ML; MG/100ML; MG/100ML; MG/100ML
INJECTION, SOLUTION INTRAVENOUS CONTINUOUS PRN
Status: DISCONTINUED | OUTPATIENT
Start: 2025-04-30 | End: 2025-04-30

## 2025-04-30 RX ADMIN — AMINOCAPROIC ACID 5 G: 250 INJECTION, SOLUTION INTRAVENOUS at 08:00

## 2025-04-30 RX ADMIN — ACETAMINOPHEN 975 MG: 325 TABLET, FILM COATED ORAL at 15:53

## 2025-04-30 RX ADMIN — PHENYLEPHRINE HYDROCHLORIDE 200 MCG: 10 INJECTION INTRAVENOUS at 11:23

## 2025-04-30 RX ADMIN — HYDROMORPHONE HYDROCHLORIDE 0.2 MG: 0.2 INJECTION, SOLUTION INTRAMUSCULAR; INTRAVENOUS; SUBCUTANEOUS at 14:41

## 2025-04-30 RX ADMIN — ACETAMINOPHEN 975 MG: 325 TABLET, FILM COATED ORAL at 20:26

## 2025-04-30 RX ADMIN — BUPIVACAINE HYDROCHLORIDE 5 ML: 2.5 INJECTION, SOLUTION EPIDURAL; INFILTRATION; INTRACAUDAL; PERINEURAL at 11:40

## 2025-04-30 RX ADMIN — Medication 30 MG: at 10:45

## 2025-04-30 RX ADMIN — Medication 100 MG: at 07:40

## 2025-04-30 RX ADMIN — PROPOFOL 100 MG: 10 INJECTION, EMULSION INTRAVENOUS at 07:40

## 2025-04-30 RX ADMIN — HYDROMORPHONE HYDROCHLORIDE 0.2 MG: 0.2 INJECTION, SOLUTION INTRAMUSCULAR; INTRAVENOUS; SUBCUTANEOUS at 15:34

## 2025-04-30 RX ADMIN — AMINOCAPROIC ACID 1 G/HR: 250 INJECTION, SOLUTION INTRAVENOUS at 08:59

## 2025-04-30 RX ADMIN — OXYCODONE HYDROCHLORIDE 10 MG: 10 TABLET ORAL at 16:25

## 2025-04-30 RX ADMIN — Medication 2 G: at 08:00

## 2025-04-30 RX ADMIN — FENTANYL CITRATE 100 MCG: 50 INJECTION INTRAMUSCULAR; INTRAVENOUS at 08:09

## 2025-04-30 RX ADMIN — PHENYLEPHRINE HYDROCHLORIDE 100 MCG: 10 INJECTION INTRAVENOUS at 07:40

## 2025-04-30 RX ADMIN — INSULIN HUMAN 3 UNITS/HR: 1 INJECTION, SOLUTION INTRAVENOUS at 10:20

## 2025-04-30 RX ADMIN — PROTAMINE SULFATE 240 MG: 10 INJECTION, SOLUTION INTRAVENOUS at 11:45

## 2025-04-30 RX ADMIN — Medication 20 MG: at 09:34

## 2025-04-30 RX ADMIN — INSULIN HUMAN 3 UNITS: 100 INJECTION, SOLUTION PARENTERAL at 10:20

## 2025-04-30 RX ADMIN — ACETAMINOPHEN 975 MG: 325 TABLET ORAL at 06:24

## 2025-04-30 RX ADMIN — FENTANYL CITRATE 50 MCG: 50 INJECTION, SOLUTION INTRAMUSCULAR; INTRAVENOUS at 06:52

## 2025-04-30 RX ADMIN — DEXMEDETOMIDINE HYDROCHLORIDE 0.3 MCG/KG/HR: 100 INJECTION, SOLUTION INTRAVENOUS at 11:45

## 2025-04-30 RX ADMIN — MIDAZOLAM 2 MG: 1 INJECTION INTRAMUSCULAR; INTRAVENOUS at 06:53

## 2025-04-30 RX ADMIN — METHOCARBAMOL 500 MG: 500 TABLET ORAL at 22:26

## 2025-04-30 RX ADMIN — NOREPINEPHRINE BITARTRATE 0.03 MCG/KG/MIN: 0.06 INJECTION, SOLUTION INTRAVENOUS at 10:51

## 2025-04-30 RX ADMIN — DEXAMETHASONE SODIUM PHOSPHATE 10 MG: 4 INJECTION, SOLUTION INTRAMUSCULAR; INTRAVENOUS at 08:20

## 2025-04-30 RX ADMIN — OXYCODONE HYDROCHLORIDE 10 MG: 10 TABLET ORAL at 20:27

## 2025-04-30 RX ADMIN — PROTAMINE SULFATE 10 MG: 10 INJECTION, SOLUTION INTRAVENOUS at 11:43

## 2025-04-30 RX ADMIN — PANTOPRAZOLE SODIUM 40 MG: 40 TABLET, DELAYED RELEASE ORAL at 15:52

## 2025-04-30 RX ADMIN — HYDROMORPHONE HYDROCHLORIDE 1 MG: 4 INJECTION, SOLUTION INTRAMUSCULAR; INTRAVENOUS; SUBCUTANEOUS at 11:54

## 2025-04-30 RX ADMIN — NOREPINEPHRINE BITARTRATE 6.4 MCG: 1 INJECTION, SOLUTION, CONCENTRATE INTRAVENOUS at 11:24

## 2025-04-30 RX ADMIN — Medication 200 MG: at 12:42

## 2025-04-30 RX ADMIN — BUPIVACAINE HYDROCHLORIDE 5 ML: 2.5 INJECTION, SOLUTION EPIDURAL; INFILTRATION; INTRACAUDAL; PERINEURAL at 12:17

## 2025-04-30 RX ADMIN — CLINDAMYCIN IN 5 PERCENT DEXTROSE 900 MG: 18 INJECTION, SOLUTION INTRAVENOUS at 07:12

## 2025-04-30 RX ADMIN — SODIUM CHLORIDE, SODIUM LACTATE, POTASSIUM CHLORIDE, CALCIUM CHLORIDE: 600; 310; 30; 20 INJECTION, SOLUTION INTRAVENOUS at 07:34

## 2025-04-30 RX ADMIN — DEXMEDETOMIDINE HYDROCHLORIDE 0.2 MCG/KG/HR: 400 INJECTION INTRAVENOUS at 17:43

## 2025-04-30 RX ADMIN — MIDAZOLAM 2 MG: 1 INJECTION INTRAMUSCULAR; INTRAVENOUS at 07:37

## 2025-04-30 RX ADMIN — CLINDAMYCIN IN 5 PERCENT DEXTROSE 900 MG: 18 INJECTION, SOLUTION INTRAVENOUS at 16:01

## 2025-04-30 RX ADMIN — NOREPINEPHRINE BITARTRATE 12.8 MCG: 1 INJECTION, SOLUTION, CONCENTRATE INTRAVENOUS at 11:40

## 2025-04-30 RX ADMIN — FAMOTIDINE 20 MG: 20 TABLET, FILM COATED ORAL at 06:26

## 2025-04-30 RX ADMIN — Medication 30 MG: at 08:35

## 2025-04-30 RX ADMIN — LIDOCAINE HYDROCHLORIDE 100 MG: 20 INJECTION, SOLUTION INFILTRATION; PERINEURAL at 07:40

## 2025-04-30 RX ADMIN — GABAPENTIN 300 MG: 300 CAPSULE ORAL at 06:26

## 2025-04-30 RX ADMIN — SENNOSIDES AND DOCUSATE SODIUM 1 TABLET: 50; 8.6 TABLET ORAL at 20:27

## 2025-04-30 RX ADMIN — NITROGLYCERIN 50 MCG: 10 INJECTION INTRAVENOUS at 11:45

## 2025-04-30 RX ADMIN — HEPARIN SODIUM 30000 UNITS: 1000 INJECTION INTRAVENOUS; SUBCUTANEOUS at 08:48

## 2025-04-30 RX ADMIN — Medication 2 G: at 12:00

## 2025-04-30 RX ADMIN — EPINEPHRINE 0.02 MCG/KG/MIN: 1 INJECTION INTRAMUSCULAR; INTRAVENOUS; SUBCUTANEOUS at 11:33

## 2025-04-30 RX ADMIN — CHLORHEXIDINE GLUCONATE 10 ML: 1.2 SOLUTION ORAL at 06:25

## 2025-04-30 RX ADMIN — GABAPENTIN 300 MG: 300 CAPSULE ORAL at 16:21

## 2025-04-30 RX ADMIN — HYDROMORPHONE HYDROCHLORIDE 0.4 MG: 0.2 INJECTION, SOLUTION INTRAMUSCULAR; INTRAVENOUS; SUBCUTANEOUS at 17:17

## 2025-04-30 RX ADMIN — FENTANYL CITRATE 100 MCG: 50 INJECTION INTRAMUSCULAR; INTRAVENOUS at 11:53

## 2025-04-30 RX ADMIN — Medication 20 MG: at 11:41

## 2025-04-30 RX ADMIN — FENTANYL CITRATE 100 MCG: 50 INJECTION INTRAMUSCULAR; INTRAVENOUS at 08:34

## 2025-04-30 RX ADMIN — METHOCARBAMOL 500 MG: 500 TABLET ORAL at 15:53

## 2025-04-30 RX ADMIN — Medication 1500 MG: at 08:00

## 2025-04-30 RX ADMIN — FENTANYL CITRATE 150 MCG: 50 INJECTION INTRAMUSCULAR; INTRAVENOUS at 08:42

## 2025-04-30 RX ADMIN — SODIUM CHLORIDE, SODIUM GLUCONATE, SODIUM ACETATE, POTASSIUM CHLORIDE AND MAGNESIUM CHLORIDE: 526; 502; 368; 37; 30 INJECTION, SOLUTION INTRAVENOUS at 12:09

## 2025-04-30 RX ADMIN — Medication 10 ML/HR: at 11:51

## 2025-04-30 RX ADMIN — Medication 1500 MG: at 20:27

## 2025-04-30 RX ADMIN — PHENYLEPHRINE HYDROCHLORIDE 200 MCG: 10 INJECTION INTRAVENOUS at 11:24

## 2025-04-30 RX ADMIN — SODIUM CHLORIDE, SODIUM LACTATE, POTASSIUM CHLORIDE, AND CALCIUM CHLORIDE: .6; .31; .03; .02 INJECTION, SOLUTION INTRAVENOUS at 08:00

## 2025-04-30 RX ADMIN — FENTANYL CITRATE 150 MCG: 50 INJECTION INTRAMUSCULAR; INTRAVENOUS at 07:40

## 2025-04-30 RX ADMIN — CLINDAMYCIN IN 5 PERCENT DEXTROSE 900 MG: 18 INJECTION, SOLUTION INTRAVENOUS at 22:25

## 2025-04-30 ASSESSMENT — ACTIVITIES OF DAILY LIVING (ADL)
ADLS_ACUITY_SCORE: 17
ADLS_ACUITY_SCORE: 15
ADLS_ACUITY_SCORE: 17
ADLS_ACUITY_SCORE: 15
ADLS_ACUITY_SCORE: 35
ADLS_ACUITY_SCORE: 15
ADLS_ACUITY_SCORE: 35
ADLS_ACUITY_SCORE: 15
ADLS_ACUITY_SCORE: 17
ADLS_ACUITY_SCORE: 15
ADLS_ACUITY_SCORE: 35

## 2025-04-30 NOTE — ANESTHESIA PROCEDURE NOTES
Perioperative ARMANDO Procedure Note    Staff -        Anesthesiologist:  Ced Mckenzie MD       Performed By: anesthesiologist  Preanesthesia Checklist:  Patient identified, IV assessed, risks and benefits discussed, monitors and equipment assessed, procedure being performed at surgeon's request and anesthesia consent obtained.    ARMANDO Probe Insertion    Probe Status PRE Insertion: NO obvious damage  Probe type:  Adult 3D  Bite block used:   Molar  Insertion Technique: Easy, no oropharyngeal manipulation  Insertion complications: None obvious  Billing Report:ARMANDO report by Anesthesiologist (See Separate Report note)  Probe Status POST Removal: NO obvious damage    ARMANDO Report  General Procedure Information  Images for this study have been archived.  Modalities: 2D, 3D, CW Doppler, PW Doppler and Color flow mapping  Echocardiographic and Doppler Measurements  Right Ventricle:  Cavity size normal.    Hypertrophy not present.   Thrombus not present.    Global function normal.     Left Ventricle:  Cavity size normal.    Hypertrophy not present.   Thrombus not present.   Global Function normal.       Ventricular Regional Function:  1- Basal Anteroseptal:  normal  2- Basal Anterior:  normal  3- Basal Anterolateral:  normal  4- Basal Inferolateral:  normal  5- Basal Inferior:  normal  6- Basal Inferoseptal:  normal  7- Mid Anteroseptal:  normal  8- Mid Anterior:  normal  9- Mid Anterolateral:  normal  10- Mid Inferolateral:  normal  11- Mid Inferior:  normal  12- Mid Inferoseptal:  normal  13- Apical Anterior:  normal  14- Apical Lateral:  normal  15- Apical Inferior:  normal  16- Apical Septal:  normal  17- [place]:  normal    Valves  Aortic Valve: Annulus normal.  Stenosis not present.  Regurgitation absent.  Leaflets normal.  Leaflet motions normal.    Mitral Valve: Annulus dilated.  Stenosis not present.  Regurgitation +4.  Leaflets myxomatous and thickened.  Leaflet motions prolapsed and flail.  Specific leaflet segments  with abnormal motions:  A2 and P2  Tricuspid Valve: Annulus normal.  Stenosis not present.  Regurgitation absent.  Leaflets normal.  Leaflet motions normal.    Pulmonic Valve: Annulus normal.  Stenosis not present.  Regurgitation absent.      Aorta: Ascending Aorta: Size normal.  Diameter 3.1 cm.  Dissection not present.  Plaque thickness less than 3 mm.  Mobile plaque not present.    Aortic Arch: Size normal.    Dissection not present.   Plaque thickness less than 3 mm.   Mobile plaque not present.    Descending Aorta: Size normal.   Diameter 2.2 cm.   Dissection not present.   Plaque thickness less than 3 mm.   Mobile plaque not present.      Right Atrium:  Size normal.   Spontaneous echo contrast not present.   Thrombus not present.   Tumor not present.   Device not present.     Left Atrium: Size normal.  Spontaneous echo contrast not present.  Thrombus not present.  Tumor not present.  Device not present.    Left atrial appendage normal.     Atrial Septum: Intra-atrial septal morphology normal.       Ventricular Septum: Intra-ventricular septum morphology normal.        Other Findings:   Pericardium:  normal. Pleural Effusion:  none. Pulmonary Arteries:  normal. Pulmonary Venous Flow:  blunted (decreased) systolic flow. No coronary sinus catheter present.    Post Intervention Findings  Procedure(s) performed:  Mitral Valve Repair/Replace. Global function:  Unchanged. Regional wall motion: Unchanged. Surgeon(s) notified of all postintervention findings: Yes. Mitral Valve: Native valve repaired. No ANDRES present. No paravalvular leak.            Post Intervention comments: Post ARMANDO:  RV function normal   LVEF 55% with no RWMA  No TR  No WI  No AR  No MR, no paravalvular leak, mean gradient of 3mmHg through the mitral valve  No systolic anterior motion of the mitral valve  No aortic dissections  No effusions in the pericardial or left pleural space.   .    Echocardiogram Comments

## 2025-04-30 NOTE — PLAN OF CARE
Major shift events:  Extubated.  C/O right shoulder and back pain along with right ring and pinky finger numbness; PRN pain meds given, dex restarted.  PV block partially occluded; anesthesia flushed, now working well.    Neuro:  Fully intact.  Perrla.    Cardiac:  SR, no ectopy.  SBP<140.  Temp pacer in place.  VVI @ 50    Pulm:  Extubated, 2L NC.  Lungs clear.  CT x2 with ~ 120mL this shift    GI/:  Regular diet.  Rojas with adequate uop.    Drips:    Precedex @ 0.3    Plan:  Dex overnight for right shoulder/back pain control.

## 2025-04-30 NOTE — ANESTHESIA PROCEDURE NOTES
Arterial Line Procedure Note    Pre-Procedure   Staff -        Anesthesiologist:  Ced Mckenzie MD       Resident/Fellow: Johnie Kelley MD       Performed By: resident       Location: OR       Pre-Anesthestic Checklist: patient identified, IV checked, risks and benefits discussed, informed consent, monitors and equipment checked, pre-op evaluation and at physician/surgeon's request  Timeout:       Correct Patient: Yes        Correct Procedure: Yes        Correct Site: Yes        Correct Position: Yes   Line Placement:   This line was placed Post Induction  Procedure   Procedure: arterial line       Diagnosis: hemodynamic monitoring       Laterality: left       Insertion Site: radial.  Sterile Prep        Standard elements of sterile barrier followed       Skin prep: Chloraprep  Insertion/Injection        Technique: ultrasound guided and Seldinger Technique        1. Ultrasound was used to evaluate the access site.       2. Artery evaluated via ultrasound for patency/adequacy.       3. Using real-time ultrasound the needle/catheter was observed entering the artery/vein.       Catheter Type/Size: 20 G, 12 cm  Narrative         Secured by: anchor securement device       Tegaderm dressing used.       Complications: None apparent,        Arterial waveform: Yes        IBP within 10% of NIBP: Yes

## 2025-04-30 NOTE — BRIEF OP NOTE
[facility]    Brief Operative Note    Pre-operative diagnosis: Mitral regurgitation [I34.0]  Post-operative diagnosis Same as pre-operative diagnosis    Procedure: RIGHT MINI-THORACOTOMY, ON CARDIOPULMONARY BYPASS, MINIMALLY INVASIVE MITRAL VALVE REPAIR (ANNULOPLASTY RING AMI CAT PHYSIO II 32MM), TRANESOPHAGEAL ECHOCARDIOGRAM (ARMANDO) BY ANESTHESIA., N/A - Chest    Surgeon: Surgeons and Role:     * Debbie Maxwell MD - Primary     * Kam Gtz MD - Fellow - Assisting  Anesthesia: General with Block   Estimated Blood Loss: 400 ml    Drains: 2x pleural anita  Specimens: * No specimens in log *  Findings:   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  Repaired with 3x 16mm neochords with cleft closure and 32mm ring  Complications: None.  Implants:   Implant Name Type Inv. Item Serial No.  Lot No. LRB No. Used Action   ANNULOPLASTY RING AMI CAT PHYSIO II 32MM [id number] - [id number] Annuloplasty Ring ANNULOPLASTY RING AMI CAT PHYSIO II 32MM [id number] [employer]  N/A 1 Implanted       Kam Gtz MD  Cardiothoracic Surgery Fellow

## 2025-04-30 NOTE — ANESTHESIA PROCEDURE NOTES
Paravertebral Procedure Note    Pre-Procedure   Staff -        Anesthesiologist:  Tomas Lua MD       Resident/Fellow: Oc Davis MD       Performed By: resident and anesthesiologist       Location: pre-op       Procedure Start/Stop Times: 4/30/2025 6:50 AM and 4/30/2025 7:05 AM       Pre-Anesthestic Checklist: patient identified, IV checked, site marked, risks and benefits discussed, informed consent, monitors and equipment checked, pre-op evaluation, at physician/surgeon's request and post-op pain management  Timeout:       Correct Patient: Yes        Correct Procedure: Yes        Correct Site: Yes        Correct Position: Yes        Correct Laterality: Yes        Site Marked: Yes  Procedure Documentation  Procedure: Paravertebral  Thoracic plane block         Diagnosis: PERIOPERATIVE ANALGESIA       Laterality: right       Patient Position: prone       Patient Prep/Sterile Barriers: sterile gloves, mask       Skin prep: Chloraprep       Local skin infiltrated with 3 mL of 1% lidocaine.        Insertion Site: T6-7.       Needle Type: Touhy needle       Needle Gauge: 17.        Needle Length (millimeters): 90        Catheter: 17 G.          Catheter threaded easily.             Ultrasound guided       1. Ultrasound was used to identify targeted nerve, plexus, vascular marker, or fascial plane and place a needle adjacent to it in real-time.       2. Ultrasound was used to visualize the spread of anesthetic in close proximity to the above referenced structure.       3. A permanent image is entered into the patient's record.    Assessment/Narrative         The placement was negative for: blood aspirated, painful injection and site bleeding       Paresthesias: No.       Bolus given via catheter. no blood aspirated via catheter.        Secured via Tegaderm and Dermabond.        Insertion/Infusion Method: Continuous Infusion and Single Shot       Complications: none    Medication(s) Administered   Medication  "Administration Time: 4/30/2025 6:50 AM      FOR [facility] ONLY:   Pain Team Contact information: please page the Pain Team Via [employer]. Search \"Pain\". During daytime hours, please page the attending first. At night please page the resident first.      "

## 2025-04-30 NOTE — ANESTHESIA PROCEDURE NOTES
Central Line/PA Catheter Placement    Pre-Procedure   Staff -        Anesthesiologist:  Ced Mckenzie MD       Resident/Fellow: Johnie Kelley MD       Performed By: resident       Location: OR       Pre-Anesthestic Checklist: patient identified, IV checked, site marked, risks and benefits discussed, informed consent, monitors and equipment checked, pre-op evaluation and at physician/surgeon's request  Timeout:       Correct Patient: Yes        Correct Procedure: Yes        Correct Site: Yes        Correct Position: Yes        Correct Laterality: Yes   Line Placement:   This line was placed Post Induction    Procedure   Procedure: central line       Laterality: right       Insertion Site: internal jugular.       Patient Position: Trendelenburg and supine  Sterile Prep        All elements of maximal sterile barrier technique followed       Patient Prep/Sterile Barriers: draped, hand hygiene, gloves , hat , mask , draped, gown, sterile gel and probe cover       Skin prep: Chloraprep  Insertion/Injection        Technique: ultrasound guided and Seldinger Technique        1. Ultrasound was used to evaluate the access site.       2. Vein evaluated via ultrasound for patency/adequacy.       3. Using real-time ultrasound the needle/catheter was observed entering the artery/vein.       Introducer Type: 9 Fr, 2-lumen MAC        Type: PA/CVC with Introducer       PA Catheter Type: CCO         Appropriate RV, RA and PA waveforms noted:  Yes            Distance to Wedge Position cm: 50            Withdrawn and Locked at cm: 50            Balloon down at end of the procedure:   Narrative         Secured by: suture       Tegaderm and Biopatch dressing used.       Complications: None apparent,        blood aspirated from all lumens,        All lumens flushed: Yes       Verification method: Ultrasound, Placement to be verified post-op and ARMANDO       Tip termination: The catheter tip was threaded to reside in the superior vena  cava subject to post surgical x-ray

## 2025-04-30 NOTE — OP NOTE
Date of Service: April 30th, 2025    Referring Cardiologist: JEFF Olguin MD    Preoperative Diagnosis: severe mitral valve regurgitation    Postoperative Diagnosis: severe mitral valve regurgitation    Surgeon: Debbie Maxwell MD    Assistant: Kam Gtz MD    Name of Operation: right mini-thoracotomy endoscopic-assisted mitral valve repair with PTFE neochords to the flail P2 segment, closure of clefts between P1/P2 and P2/P3, implantation of a 32 mm [employer] Physio 2 annoloplasty ring, right common femoral arterial and venous cannulation for cardiopulmonary bypass, intraoperative ARMANDO    Anesthesia: general orotracheal    Indications for Procedure: Mr. Buenrostro is a very pleasant otherwise healthy 45-year-old gentleman who was recently referred to me for an evaluation for minimally invasive mitral valve repair.  Preoperative ARMANDO demonstrated severe mitral regurgitation from bileaflet prolapse with some annular disjunction and a very prominent flail P2 segment and a large cleft between the P1 and P2 segments.  He was taken to the operating today for a minimally invasive mitral valve repair.    I also explained to the patient that I am a consultant for several medical device companies including [employer] and [employer], whose products I may use during the operation. I am a consultant for [employer], and based on my expertise, I receive speakership fees for giving invited talks at educational conferences and also teaching the surgical techniques related to minimally invasive mitral valve repair and replacements. [employer] products include the On-X mechanical valves, and the Chord-X system which are PTFE neochords used for mitral valve repairs. [employer] products include bioprosthetic valves and annuloplasty ring devices. Based on the patient's valve pathology, I have determined that the patient may be a great candidate for use of one or more of the products of [employer].  I do not receive any form of compensation for  using these products. I explained to the patient that the patient has the right to refuse the procedure or revoke the informed consent because of this disclosure, and the patient could continue with other treatments/procedures without any penalty or loss of benefit to which the patient may otherwise be entitled. The patient understood and agreed to proceed.       Operative Findings: The patient had an overall normal LV systolic size and function.  He had severe left atrial enlargement.  He had severe mitral valve regurgitation with multiple complex jets with some annular disjunction causing a pseudo anterior leaflet prolapse and an obvious flail P2 segment from multiple ruptured chordae.  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    Description of the Procedure: After informed consent was obtained, the patient was brought onto the operating room and was placed on the OR table in the supine position.  Intravenous and intra-arterial lines were begun.  While monitoring his blood pressure and EKG tracing, he was anesthetized and intubated using a single-lumen endotracheal tube.  A [place]-Andres catheter was also placed.  His entire chest, abdomen, both groins and legs were prepped down to the toes using multiple layers of DuraPrep.  He was draped in a sterile field.  A right minithoracotomy incision was made, and the fourth intercostal space was entered.  An Shahbaz soft tissue wound retractor was used for exposure.  Carbon dioxide was flooded into the chest to prevent air embolism.  A 10 mm 30 degree endoscope was then passed into the chest via the third intercostal space using a trocar.  The patient was then fully heparinized.  A small right groin incision was made to expose the right common femoral artery and vein.  5-0 Prolene pursestring sutures were used to cannulate the right common femoral artery and vein using a 17 Chinese femoral arterial cannula and a 27 Chinese multistage  venous cannula, respectively.  Seldinger technique was used with ARMANDO guidance.  After appropriate ACT level was achieved, cardiopulmonary bypass was established and the patient was kept normothermic during the entire operation.    The right phrenic nerve was identified and protected.  The pericardium was opened 1 cm anteriorly in a parallel fashion to the phrenic nerve.  The venous drainage of the heart was excellent.  The inter-atrial groove was dissected out using electrocautery.  An antegrade needle/aortic root vent was placed in the ascending aorta.  The aorta was then crossclamped and 1200 cc of [place] antegrade cardioplegia was given to fully arrest the heart.  The patient went into good diastolic arrest without any LV distention.  A standard left atriotomy was then made and the [facility] Spectrum5 HV retractor was used to expose the mitral valve.  Our exposure was excellent.  Findings were noted above.  We decided to proceed with the mitral valve repair using a nonresectional technique with artificial cords.  We first began by placing the annular sutures using interrupted 2-0 Ethibond sutures.  The valve was carefully examined.  Multiple ruptured cords from the flail P2 segment was resected.  The 16 mm premeasured Chord-X system was brought to the field and the system was anchored down to the posterior head of the posteromedial papillary muscle.  The 3 individual 16 mm neochords were then sewn to the medial aspect of the flail P2 segment, taking about 1 cm bites and the 3 sutures were tied and cut.  This eliminated the flail segment.  The annulus was then true-sized to a 32 mm Physio 2 annuloplasty ring.  The ring was brought to the field and all sutures were brought through the sewing ring and the ring was seated down without difficulty.  All sutures were tied down securely using the Cor-Knot device.  The valve was tested by filling the left ventricle with [place]ido cardioplegia.  There were several small  regurgitant jets from a cleft between the P1 and P2 segments and another cleft between the P2 and P3 segments which we closed individually using interrupted CV 4 [place]-Wili sutures.  Final valve test demonstrated excellent leaflet coaptation with no residual regurgitant jets or any regurgitant segmental prolapse with excellent deep and symmetric closure line.  We were happy with the repair.  The left atriotomy is closed in 2 layers of running 4-0 Prolene.  The patient was placed in Trendelenburg position.  Antegrade hotshot was given and the aortic cross-clamp was removed.  Aortic cross-clamp time was 103 minutes.      The ascending aorta was continuously vented to de-air the heart.  The patient was then weaned off cardiopulmonary bypass with low-dose inotropic and vasopressor support.  Total cardiopulmonary bypass time was 152 minutes.  LV function was good.  The heart was adequately de-aired.  The mitral valve repair looked good with no residual regurgitation with a mean gradient of 2 mmHg.  Once the patient remained stable off bypass, the femoral venous cannula was removed and protamine was given.  The femoral arterial cannula was subsequently removed as well.  Temporary ventricular pacing wire was placed in the RV muscle.  Two 24 [place] Solitario drains were placed in the right pleural space.  These were all brought out percutaneously below the minithoracotomy incision and secured to the skin using 2-0 Ethibond.  The right lung reinflated nicely.  Mediastinal hemostasis was confirmed.  The rib space was reapproximated using #5 Ethibond suture.  The minithoracotomy incision was finally closed in layers of running Vicryl suture.  The skin was closed using 3-0 Vicryl and sealed using Dermabond.    There were no intraoperative complications and the patient tolerated the operation well.  No blood products were given intraoperative.  All sponge counts, needle counts, and instrument counts were correct x 2 at the end of the  operation.  EBL: 200 cc.  Specimen removed: none.  The patient was brought to the ICU in hemodynamically stable condition and remained intubated.    Debbie Maxwell MD

## 2025-04-30 NOTE — PROGRESS NOTES
Notified pump was giving alarms for occlusion. Assessed at bedside. Able to hand bolus 5ml of saline through catheter. Pt was not able to get prior PIB bolus. Bolused 6ml via pump. Next bolus scheduled in 35 min. Discussed with patient and bedside nurse.    Igor May MD on 4/30/2025 at 3:53 PM  Anesthesiology PGY4

## 2025-04-30 NOTE — PROGRESS NOTES
RT CV Post-op Extubation Plan    ETT Cuffed 8 mm-Secured at (cm): 24 cm cm at teeth/gums    Initial vent settings:     Resp Rate (Set): 18 breaths/min  Tidal Volume (Set, mL): 450 mL   FiO2: 40 %   PEEP (cm H2O): 7 cmH2O     Is patient fast track? Yes    Patient out of OR time: 1243  **Extubation goal is within 6 hours after leaving the OR**      Patient meets all criteria for wean as outlined in policy: Yes      Summary of weaning attempts in first 24 hours (Patients to be placed on CPAP/Pressure Support of 5/5):  **Weaning to be between 30 min and 60 min**  **Contact provider if there is a concern over extubation**    Start: 1452  End: 1540  Was patient extubated? Yes      RT comments:  Patient was extubated to 2L NC with EtCO2 monitoring. Oral and Inline suctioning was completed with a cuff leak present prior to extubation.      Lázaro Clement, RT on 4/30/2025 at 12:55 PM

## 2025-04-30 NOTE — ANESTHESIA POSTPROCEDURE EVALUATION
Patient: Johnie Buenrostro    Procedure: Procedure(s):  RIGHT MINI-THORACOTOMY, ON CARDIOPULMONARY BYPASS, MINIMALLY INVASIVE MITRAL VALVE REPAIR (ANNULOPLASTY RING AMI CAT PHYSIO II 32MM), TRANESOPHAGEAL ECHOCARDIOGRAM (ARMANDO) BY ANESTHESIA.       Anesthesia Type:  General    Note:  Disposition: ICU            ICU Sign Out: Anesthesiologist/ICU physician sign out WAS performed   Postop Pain Control: Uneventful            Sign Out: Well controlled pain   PONV: No   Neuro/Psych: Uneventful            Sign Out: Acceptable/Baseline neuro status   Airway/Respiratory: Uneventful            Sign Out: AIRWAY IN SITU/Resp. Support               Airway in situ/Resp. Support: ETT                 Reason: Planned Pre-op   CV/Hemodynamics: Uneventful            Sign Out: Acceptable CV status; No obvious hypovolemia; No obvious fluid overload   Other NRE: NONE   DID A NON-ROUTINE EVENT OCCUR? No           Last vitals:  Vitals:    04/30/25 0720 04/30/25 1253 04/30/25 1300   BP: (!) 139/94     Pulse: 68 89    Resp: 13  18   Temp:      SpO2: 99% 99%        Electronically Signed By: Ced Mckenzie MD  April 30, 2025  1:51 PM

## 2025-04-30 NOTE — ANESTHESIA PROCEDURE NOTES
Airway       Patient location during procedure: OR       Procedure Start/Stop Times: 4/30/2025 7:43 AM  Staff -        Anesthesiologist:  Ced Mckenzie MD       Resident/Fellow: Johnie Kelley MD       Performed By: resident  Consent for Airway        Urgency: elective  Indications and Patient Condition       Indications for airway management: eugenio-procedural       Induction type:intravenous       Mask difficulty assessment: 1 - vent by mask    Final Airway Details       Final airway type: endotracheal airway       Successful airway: ETT - single  Endotracheal Airway Details        ETT size (mm): 8.0       Cuffed: yes       Successful intubation technique: direct laryngoscopy       DL Blade Type: MAC 4       Grade View of Cords: 2       Adjucts: stylet       Position: Right       Measured from: lips       Secured at (cm): 24       Bite block used: Soft    Post intubation assessment        Placement verified by: capnometry, equal breath sounds and chest rise        Number of attempts at approach: 1       Number of other approaches attempted: 0       Secured with: pink tape       Ease of procedure: easy       Dentition: Intact and Unchanged    Medication(s) Administered   Medication Administration Time: 4/30/2025 7:43 AM

## 2025-04-30 NOTE — H&P
CV ICU H&P    ASSESSMENT: Johnie Buenrostro is a 45 year old male with PMH of ADHD and mitral valve regurgitation. Presents to [facility] for minimally invasive mitral valve repair by Dr. Maxwell on 4/30/2025.    Arrives from the CVICU intubated and sedated on dexmedetomidine 1mcg/kg/hr . On 0.8 norepi gtt for pressor and inotropic support. Annuloplasty ring Allan Justice Physion II 32mm  , CT placed, V wires placed, remains paced at 50 BPM. Bypass time 152 minutes,  and  off bypass. Required no pRBCs or  plts intra op. Received  600 cell saver, 1000 L crystalloid. Access is RIJ with [place], R rad A line. Reversed. Pre procedure Echo LVEF normal with no RWMA, Post procedure: no systolic anterior motion or paravalvular leak.    CO-MORBIDITIES:   Patient Active Problem List   Diagnosis    Mitral valve prolapse    Status post coronary angiogram    Nonrheumatic mitral valve regurgitation       Preop Echo:  LVEF normal with no RWMA      PLAN:  Neuro/ pain/ sedation:  - Monitor neurological status. Notify the MD for any acute changes in exam.    #Acute postoperative pain  - Scheduled: Tylenol   - PRN: Dilaudid and robaxin PRN  - Regional anesthesia: bilateral erector spinae plane catheters    #Sedation  - Scheduled: none  - Gtt: Dexmedetomidine 1mcg/kg/hr  - Wean sedation as tolerated    Pulmonary care:   #Mechanical ventilation  FiO2 (%): 40 %, Resp: 18, Resp Rate (Set): 18 breaths/min, Tidal Volume (Set, mL): 450 mL, PEEP (cm H2O): 7 cmH2O, Resp Rate (Set): 18 breaths/min, Tidal Volume (Set, mL): 450 mL, PEEP (cm H2O): 7 cmH2O  - Goal SpO2 > 92%  - Encourage IS q15-30 minutes when awake  - Post-op CXR with no evidence of pneumothorax. Endotracheal tube projecting over mid thoracic trachea.  - Wean to extubation    Cardiovascular:    #Cardiogenic shock  #S/p mini MVR  Recent echo on 4/30/25 with LVEF of 55% with no regional wall abnormality   Post CBP ARMANDO no systolic anterior motion or paravalvular leak.  - On NE gtts  for inotropic and pressor support, wean as able  - Monitor hemodynamic status.   - Goal MAP >65, SBP <140  - ASA: start on 5/1/25  - Plan for V wires pending pacing discontinuation    GI care / Nutrition:   - NPO, bedside swallow eval once extubated  - Nutrition consulted, appreciate recommendations  - PPI for bowel prophylaxis  - Bowel regimen: MiraLAX, senna    Renal / Fluids / Electrolytes:   BL creat appears to be ~ 0.83  - Strict I/O, daily weights  - Avoid/limit nephrotoxins as able  - Replete lytes PRN per protocol    Endocrine:    #Stress hyperglycemia  Preop A1c 5.1  - Goal BG <180 for optimal healing    ID / Antibiotics:  #Stress induced leukocytosis  - No s/sx infection  - To complete perioperative regimen  - afebrile, post operative WBC 11.6   - Continue to monitor fever curve, WBC, and inflammatory markers as appropriate    Heme:     #Acute blood loss anemia  #Acute blood loss thrombocytopenia  No s/sx active bleeding  - Continue to monitor  - CBC   - Hgb goal > 7.0. Post-operative hemoglobin 12.7    MSK / Skin:  #Sternotomy  #Surgical Incision  - Sternal precautions  - Postoperative incision management per protocol  - PT/OT/CR    Prophylaxis:     - Mechanical ppx for DVT  - Chemical DVT ppx: SQH started  - PPI  - Bowel regimen: PPI, MiraLAX, senna    Lines / Tubes / Drains:  - ETT  - RIJ CVC, PA catheter  - PIV  - Arterial Line  - CTs x2  - Rojas    Disposition:  - CVICU    Patient seen, findings and plan discussed with CVICU staff.    Ming Loyola MD  PGY-3 Anesthesiology  4/30/2025 at 6:36 AM      ====================================    HPI:   Johnie Buenrostro is a 45 year old male with PMH of ADHD and mitral valve regurgitation. Presents to [facility] for minimally invasive mitral valve repair by Dr. Maxwell on 4/30/2025.    Arrives from the CVICU intubated and sedated on dexmedetomidine 1mcg/kg/hr . On 0.8 norepi gtt for pressor and inotropic support. Bioprosthetic mitral valve replacement, CT  placed, V wires placed, remains paced at 50 BPM. Bypass time 152 minutes,  and  off bypass. Required no pRBCs or  plts intra op. Received  600 cell saver, 1000 L crystalloid. Access is RIJ with [place], R rad A line. Reversed. Pre procedure Echo LVEF normal with no RWMA, Post procedure: no systolic anterior motion or paravalvular leak.    Presents to CVICU intubated and sedated.      PAST MEDICAL HISTORY:   Past Medical History:   Diagnosis Date    ADHD (attention deficit hyperactivity disorder)     Epilepsy (H)     as child    Mitral valve regurgitation     severe    Myxomatous mitral valve        PAST SURGICAL HISTORY:   Past Surgical History:   Procedure Laterality Date    COLONOSCOPY      X 2    CV CORONARY ANGIOGRAM N/A 3/14/2025    Procedure: Coronary Angiogram;  Surgeon: Cain Juarez MD;  [facility]    CV RIGHT HEART CATH MEASUREMENTS RECORDED N/A 3/14/2025    Procedure: Right Heart Catheterization;  Surgeon: Cain Juarez MD;  [facility]    [place] teeth extraction      age 16       FAMILY HISTORY:   Family History   Problem Relation Age of Onset    Anesthesia Reaction Mother         Woke up during surgery    Bleeding Disorder No family hx of     Clotting Disorder No family hx of        SOCIAL HISTORY:   Social History     Tobacco Use    Smoking status: Some Days     Types: Cigars     Passive exposure: Current    Smokeless tobacco: Never    Tobacco comments:     Once a month    Substance Use Topics    Alcohol use: Yes     Comment: 1 drink a day         OBJECTIVE:  1. VITAL SIGNS:   Temp:  [97.5  F (36.4  C)] 97.5  F (36.4  C)  Pulse:  [60-92] 89  Resp:  [11-18] 18  BP: (105-139)/(72-94) 139/94  FiO2 (%):  [40 %] 40 %  SpO2:  [97 %-100 %] 99 %    FiO2 (%): 40 %, Resp: 18, Resp Rate (Set): 18 breaths/min, Tidal Volume (Set, mL): 450 mL, PEEP (cm H2O): 7 cmH2O, Resp Rate (Set): 18 breaths/min, Tidal Volume (Set, mL): 450 mL, PEEP (cm H2O): 7  cmH2O      2. INTAKE/ OUTPUT:   No intake/output data recorded.      3. PHYSICAL EXAMINATION:   General: sedated  Neuro: sedated  Resp: intubated, ventilated.   CV: S1, S2, RRR, no m/r/g   Abdomen: Soft, non-distended, incisional dressing c/d/i  Extremities: warm and well perfused  CT: To suction, serosang output, no airleak, crepitus      4. INVESTIGATIONS:   Arterial Blood Gases   Recent Labs   Lab 04/30/25  1310 04/30/25  1118 04/30/25  1046 04/30/25  1017   PH 7.38 7.39 7.31* 7.35   PCO2 44 44 45 45   PO2 83 307* 318* 357*   HCO3 26 26 23 25     Complete Blood Count   Recent Labs   Lab 04/30/25 1310 04/30/25  1151 04/30/25  1118 04/30/25  1046 04/30/25  1017   WBC 11.6*  --   --   --   --    HGB 12.7*  --  14.3 12.8* 12.6*    194  --   --   --      Basic Metabolic Panel  Recent Labs   Lab 04/30/25  1310 04/30/25  1309 04/30/25  1118 04/30/25  1046 04/30/25  1017     --  142 142 137   POTASSIUM 4.2  --  4.5 4.6 4.8   CHLORIDE 106  --   --   --   --    CO2 23  --   --   --   --    BUN 16.4  --   --   --   --    CR 0.89  --   --   --   --    * 142* 140* 168* 191*     Liver Function Tests  Recent Labs   Lab 04/30/25  1310 04/30/25  1151   AST 74*  --    ALT 23  --    ALKPHOS 45  --    BILITOTAL 1.2  --    ALBUMIN 3.8  --    INR 1.33* 1.33*     Pancreatic Enzymes  No lab results found in last 7 days.  Coagulation Profile  Recent Labs   Lab 04/30/25 1310 04/30/25  1151   INR 1.33* 1.33*   PTT 27 21*         5. RADIOLOGY:   Recent Results (from the past 24 hours)   ARMANDO with Report    Narrative    Ced Mckenzie MD     4/30/2025  1:51 PM  Perioperative ARMANDO Procedure Note    Staff -        Anesthesiologist:  Ced Mckenzie MD       Performed By: anesthesiologist  Preanesthesia Checklist:  Patient identified, IV assessed, risks and   benefits discussed, monitors and equipment assessed, procedure being   performed at surgeon's request and anesthesia consent obtained.    ARMANDO Probe  Insertion    Probe Status PRE Insertion: NO obvious damage  Probe type:  Adult 3D  Bite block used:   Molar  Insertion Technique: Easy, no oropharyngeal manipulation  Insertion complications: None obvious  Billing Report:ARMANDO report by Anesthesiologist (See Separate Report note)  Probe Status POST Removal: NO obvious damage    ARMANDO Report  General Procedure Information  Images for this study have been archived.  Modalities: 2D, 3D, CW Doppler, PW Doppler and Color flow mapping  Echocardiographic and Doppler Measurements  Right Ventricle:  Cavity size normal.    Hypertrophy not present.     Thrombus not present.    Global function normal.     Left Ventricle:  Cavity size normal.    Hypertrophy not present.     Thrombus not present.   Global Function normal.       Ventricular Regional Function:  1- Basal Anteroseptal:  normal  2- Basal Anterior:  normal  3- Basal Anterolateral:  normal  4- Basal Inferolateral:  normal  5- Basal Inferior:  normal  6- Basal Inferoseptal:  normal  7- Mid Anteroseptal:  normal  8- Mid Anterior:  normal  9- Mid Anterolateral:  normal  10- Mid Inferolateral:  normal  11- Mid Inferior:  normal  12- Mid Inferoseptal:  normal  13- Apical Anterior:  normal  14- Apical Lateral:  normal  15- Apical Inferior:  normal  16- Apical Septal:  normal  17- [place]:  normal    Valves  Aortic Valve: Annulus normal.  Stenosis not present.  Regurgitation   absent.  Leaflets normal.  Leaflet motions normal.    Mitral Valve: Annulus dilated.  Stenosis not present.  Regurgitation +4.    Leaflets myxomatous and thickened.  Leaflet motions prolapsed and flail.    Specific leaflet segments with abnormal motions:  A2 and P2  Tricuspid Valve: Annulus normal.  Stenosis not present.  Regurgitation   absent.  Leaflets normal.  Leaflet motions normal.    Pulmonic Valve: Annulus normal.  Stenosis not present.  Regurgitation   absent.      Aorta: Ascending Aorta: Size normal.  Diameter 3.1 cm.  Dissection not   present.   Plaque thickness less than 3 mm.  Mobile plaque not present.    Aortic Arch: Size normal.    Dissection not present.   Plaque thickness   less than 3 mm.   Mobile plaque not present.    Descending Aorta: Size normal.   Diameter 2.2 cm.   Dissection not   present.   Plaque thickness less than 3 mm.   Mobile plaque not present.      Right Atrium:  Size normal.   Spontaneous echo contrast not present.     Thrombus not present.   Tumor not present.   Device not present.     Left Atrium: Size normal.  Spontaneous echo contrast not present.    Thrombus not present.  Tumor not present.  Device not present.    Left atrial appendage normal.     Atrial Septum: Intra-atrial septal morphology normal.       Ventricular Septum: Intra-ventricular septum morphology normal.        Other Findings:   Pericardium:  normal. Pleural Effusion:  none. Pulmonary Arteries:    normal. Pulmonary Venous Flow:  blunted (decreased) systolic flow. No   coronary sinus catheter present.    Post Intervention Findings  Procedure(s) performed:  Mitral Valve Repair/Replace. Global function:    Unchanged. Regional wall motion: Unchanged. Surgeon(s) notified of all   postintervention findings: Yes. Mitral Valve: Native valve repaired. No   ANDRES present. No paravalvular leak.            Post Intervention comments: Post ARMANDO:  RV function normal   LVEF 55% with no RWMA  No TR  No SD  No AR  No MR, no paravalvular leak, mean gradient of 3mmHg through the mitral   valve  No systolic anterior motion of the mitral valve  No aortic dissections  No effusions in the pericardial or left pleural space.   .    Echocardiogram Comments   XR Chest Port 1 View    Impression    RESIDENT PRELIMINARY INTERPRETATION  Impression:   1. Endotracheal tube tip projects over the mid thoracic trachea.  Additional support devices as described in the body of report.  2. Mild streaky right basilar atelectasis. No appreciable  pneumothorax.        =========================================

## 2025-04-30 NOTE — OR NURSING
Paravertebral block/catheter performed without complications. Vital signs stable during and after the procedure. Denies LAST symptoms. Pt tolerated well.  Will continue to monitor.

## 2025-05-01 ENCOUNTER — APPOINTMENT (OUTPATIENT)
Dept: GENERAL RADIOLOGY | Facility: CLINIC | Age: 45
End: 2025-05-01
Attending: STUDENT IN AN ORGANIZED HEALTH CARE EDUCATION/TRAINING PROGRAM
Payer: COMMERCIAL

## 2025-05-01 ENCOUNTER — APPOINTMENT (OUTPATIENT)
Dept: OCCUPATIONAL THERAPY | Facility: CLINIC | Age: 45
End: 2025-05-01
Attending: STUDENT IN AN ORGANIZED HEALTH CARE EDUCATION/TRAINING PROGRAM
Payer: COMMERCIAL

## 2025-05-01 ENCOUNTER — APPOINTMENT (OUTPATIENT)
Dept: GENERAL RADIOLOGY | Facility: CLINIC | Age: 45
End: 2025-05-01
Payer: COMMERCIAL

## 2025-05-01 VITALS
WEIGHT: 196.21 LBS | BODY MASS INDEX: 27.47 KG/M2 | SYSTOLIC BLOOD PRESSURE: 120 MMHG | DIASTOLIC BLOOD PRESSURE: 69 MMHG | OXYGEN SATURATION: 91 % | TEMPERATURE: 99.3 F | HEIGHT: 71 IN | RESPIRATION RATE: 16 BRPM | HEART RATE: 92 BPM

## 2025-05-01 LAB
ALBUMIN SERPL BCG-MCNC: 3.9 G/DL (ref 3.5–5.2)
ALLEN'S TEST: ABNORMAL
ALP SERPL-CCNC: 47 U/L (ref 40–150)
ALT SERPL W P-5'-P-CCNC: 22 U/L (ref 0–70)
ANION GAP SERPL CALCULATED.3IONS-SCNC: 10 MMOL/L (ref 7–15)
AST SERPL W P-5'-P-CCNC: 69 U/L (ref 0–45)
ATRIAL RATE - MUSE: 87 BPM
BASE EXCESS BLDA CALC-SCNC: 0 MMOL/L (ref -3–3)
BASE EXCESS BLDA CALC-SCNC: 0.2 MMOL/L (ref -3–3)
BASE EXCESS BLDA CALC-SCNC: 0.9 MMOL/L (ref -3–3)
BASE EXCESS BLDV CALC-SCNC: 1.1 MMOL/L (ref -3–3)
BASE EXCESS BLDV CALC-SCNC: 1.3 MMOL/L (ref -3–3)
BILIRUB SERPL-MCNC: 1.1 MG/DL
BUN SERPL-MCNC: 18.4 MG/DL (ref 6–20)
CA-I BLD-MCNC: 4.5 MG/DL (ref 4.4–5.2)
CA-I BLD-MCNC: 4.7 MG/DL (ref 4.4–5.2)
CA-I BLD-MCNC: 5 MG/DL (ref 4.4–5.2)
CALCIUM SERPL-MCNC: 8.2 MG/DL (ref 8.8–10.4)
CHLORIDE SERPL-SCNC: 102 MMOL/L (ref 98–107)
CREAT SERPL-MCNC: 0.73 MG/DL (ref 0.67–1.17)
DIASTOLIC BLOOD PRESSURE - MUSE: NORMAL MMHG
EGFRCR SERPLBLD CKD-EPI 2021: >90 ML/MIN/1.73M2
ERYTHROCYTE [DISTWIDTH] IN BLOOD BY AUTOMATED COUNT: 12.7 % (ref 10–15)
GLUCOSE BLD-MCNC: 127 MG/DL (ref 70–99)
GLUCOSE BLD-MCNC: 131 MG/DL (ref 70–99)
GLUCOSE BLDC GLUCOMTR-MCNC: 123 MG/DL (ref 70–99)
GLUCOSE BLDC GLUCOMTR-MCNC: 127 MG/DL (ref 70–99)
GLUCOSE BLDC GLUCOMTR-MCNC: 136 MG/DL (ref 70–99)
GLUCOSE SERPL-MCNC: 135 MG/DL (ref 70–99)
HCO3 BLD-SCNC: 26 MMOL/L (ref 21–28)
HCO3 BLDA-SCNC: 25 MMOL/L (ref 21–28)
HCO3 BLDA-SCNC: 26 MMOL/L (ref 21–28)
HCO3 BLDV-SCNC: 27 MMOL/L (ref 21–28)
HCO3 BLDV-SCNC: 27 MMOL/L (ref 21–28)
HCO3 SERPL-SCNC: 24 MMOL/L (ref 22–29)
HCT VFR BLD AUTO: 36.1 % (ref 40–53)
HGB BLD-MCNC: 12.3 G/DL (ref 13.3–17.7)
HGB BLD-MCNC: 12.3 G/DL (ref 13.3–17.7)
HGB BLD-MCNC: 12.5 G/DL (ref 13.3–17.7)
HGB BLD-MCNC: 12.5 G/DL (ref 13.3–17.7)
HGB BLD-MCNC: 12.6 G/DL (ref 13.3–17.7)
HGB BLD-MCNC: 12.7 G/DL (ref 13.3–17.7)
HGB BLD-MCNC: 12.8 G/DL (ref 13.3–17.7)
INTERPRETATION ECG - MUSE: NORMAL
LACTATE BLD-SCNC: 2.1 MMOL/L (ref 0.7–2)
LACTATE BLD-SCNC: 2.2 MMOL/L (ref 0.7–2)
LACTATE SERPL-SCNC: 0.8 MMOL/L (ref 0.7–2)
LACTATE SERPL-SCNC: 0.8 MMOL/L (ref 0.7–2)
LACTATE SERPL-SCNC: 0.9 MMOL/L (ref 0.7–2)
MAGNESIUM SERPL-MCNC: 2.3 MG/DL (ref 1.7–2.3)
MCH RBC QN AUTO: 30.9 PG (ref 26.5–33)
MCHC RBC AUTO-ENTMCNC: 34.6 G/DL (ref 31.5–36.5)
MCV RBC AUTO: 89 FL (ref 78–100)
O2/TOTAL GAS SETTING VFR VENT: 100 %
O2/TOTAL GAS SETTING VFR VENT: 100 %
O2/TOTAL GAS SETTING VFR VENT: 2 %
O2/TOTAL GAS SETTING VFR VENT: 2 %
O2/TOTAL GAS SETTING VFR VENT: 21 %
OXYHGB MFR BLDA: 96 % (ref 92–100)
OXYHGB MFR BLDA: 97 % (ref 92–100)
OXYHGB MFR BLDA: 98 % (ref 92–100)
OXYHGB MFR BLDA: 98 % (ref 92–100)
OXYHGB MFR BLDV: 71 % (ref 70–75)
OXYHGB MFR BLDV: 74 % (ref 70–75)
P AXIS - MUSE: 30 DEGREES
PCO2 BLD: 44 MM HG (ref 35–45)
PCO2 BLDA: 38 MM HG (ref 35–45)
PCO2 BLDA: 49 MM HG (ref 35–45)
PCO2 BLDV: 48 MM HG (ref 40–50)
PCO2 BLDV: 49 MM HG (ref 40–50)
PH BLD: 7.38 [PH] (ref 7.35–7.45)
PH BLDA: 7.34 [PH] (ref 7.35–7.45)
PH BLDA: 7.42 [PH] (ref 7.35–7.45)
PH BLDV: 7.36 [PH] (ref 7.32–7.43)
PH BLDV: 7.36 [PH] (ref 7.32–7.43)
PHOSPHATE SERPL-MCNC: 4.7 MG/DL (ref 2.5–4.5)
PLATELET # BLD AUTO: 168 10E3/UL (ref 150–450)
PO2 BLD: 102 MM HG (ref 80–105)
PO2 BLDA: 136 MM HG (ref 80–105)
PO2 BLDA: 285 MM HG (ref 80–105)
PO2 BLDV: 40 MM HG (ref 25–47)
PO2 BLDV: 41 MM HG (ref 25–47)
POTASSIUM BLD-SCNC: 3.7 MMOL/L (ref 3.4–5.3)
POTASSIUM BLD-SCNC: 3.8 MMOL/L (ref 3.4–5.3)
POTASSIUM SERPL-SCNC: 4.6 MMOL/L (ref 3.4–5.3)
PR INTERVAL - MUSE: 142 MS
PROT SERPL-MCNC: 6 G/DL (ref 6.4–8.3)
QRS DURATION - MUSE: 72 MS
QT - MUSE: 358 MS
QTC - MUSE: 430 MS
R AXIS - MUSE: -13 DEGREES
RBC # BLD AUTO: 4.04 10E6/UL (ref 4.4–5.9)
SAO2 % BLDA: 100 % (ref 96–97)
SAO2 % BLDA: 98.4 % (ref 96–97)
SAO2 % BLDA: 99 % (ref 96–97)
SAO2 % BLDV: 72.2 % (ref 70–75)
SAO2 % BLDV: 75 % (ref 70–75)
SODIUM BLD-SCNC: 141 MMOL/L (ref 135–145)
SODIUM BLD-SCNC: 141 MMOL/L (ref 135–145)
SODIUM SERPL-SCNC: 136 MMOL/L (ref 135–145)
SYSTOLIC BLOOD PRESSURE - MUSE: NORMAL MMHG
T AXIS - MUSE: -2 DEGREES
VENTRICULAR RATE- MUSE: 87 BPM
WBC # BLD AUTO: 10.6 10E3/UL (ref 4–11)

## 2025-05-01 PROCEDURE — 80053 COMPREHEN METABOLIC PANEL: CPT

## 2025-05-01 PROCEDURE — 250N000013 HC RX MED GY IP 250 OP 250 PS 637: Performed by: PHYSICIAN ASSISTANT

## 2025-05-01 PROCEDURE — 250N000013 HC RX MED GY IP 250 OP 250 PS 637

## 2025-05-01 PROCEDURE — 93005 ELECTROCARDIOGRAM TRACING: CPT

## 2025-05-01 PROCEDURE — 120N000002 HC R&B MED SURG/OB UMMC

## 2025-05-01 PROCEDURE — 83735 ASSAY OF MAGNESIUM: CPT | Performed by: STUDENT IN AN ORGANIZED HEALTH CARE EDUCATION/TRAINING PROGRAM

## 2025-05-01 PROCEDURE — 82805 BLOOD GASES W/O2 SATURATION: CPT | Performed by: STUDENT IN AN ORGANIZED HEALTH CARE EDUCATION/TRAINING PROGRAM

## 2025-05-01 PROCEDURE — 999N000156 HC STATISTIC RCP CONSULT EA 30 MIN

## 2025-05-01 PROCEDURE — 258N000003 HC RX IP 258 OP 636

## 2025-05-01 PROCEDURE — 250N000011 HC RX IP 250 OP 636: Mod: JZ | Performed by: STUDENT IN AN ORGANIZED HEALTH CARE EDUCATION/TRAINING PROGRAM

## 2025-05-01 PROCEDURE — 94660 CPAP INITIATION&MGMT: CPT

## 2025-05-01 PROCEDURE — 71045 X-RAY EXAM CHEST 1 VIEW: CPT

## 2025-05-01 PROCEDURE — 93010 ELECTROCARDIOGRAM REPORT: CPT | Performed by: INTERNAL MEDICINE

## 2025-05-01 PROCEDURE — 71045 X-RAY EXAM CHEST 1 VIEW: CPT | Mod: 26 | Performed by: STUDENT IN AN ORGANIZED HEALTH CARE EDUCATION/TRAINING PROGRAM

## 2025-05-01 PROCEDURE — 250N000013 HC RX MED GY IP 250 OP 250 PS 637: Performed by: STUDENT IN AN ORGANIZED HEALTH CARE EDUCATION/TRAINING PROGRAM

## 2025-05-01 PROCEDURE — 999N000157 HC STATISTIC RCP TIME EA 10 MIN

## 2025-05-01 PROCEDURE — 71045 X-RAY EXAM CHEST 1 VIEW: CPT | Mod: 26 | Performed by: RADIOLOGY

## 2025-05-01 PROCEDURE — 71045 X-RAY EXAM CHEST 1 VIEW: CPT | Mod: 77

## 2025-05-01 PROCEDURE — 250N000011 HC RX IP 250 OP 636: Performed by: STUDENT IN AN ORGANIZED HEALTH CARE EDUCATION/TRAINING PROGRAM

## 2025-05-01 PROCEDURE — 271N000002 HC RX 271: Performed by: STUDENT IN AN ORGANIZED HEALTH CARE EDUCATION/TRAINING PROGRAM

## 2025-05-01 PROCEDURE — 97530 THERAPEUTIC ACTIVITIES: CPT | Mod: GO

## 2025-05-01 PROCEDURE — 97166 OT EVAL MOD COMPLEX 45 MIN: CPT | Mod: GO

## 2025-05-01 PROCEDURE — 250N000009 HC RX 250

## 2025-05-01 PROCEDURE — 99232 SBSQ HOSP IP/OBS MODERATE 35: CPT | Performed by: PHYSICIAN ASSISTANT

## 2025-05-01 PROCEDURE — 84100 ASSAY OF PHOSPHORUS: CPT | Performed by: STUDENT IN AN ORGANIZED HEALTH CARE EDUCATION/TRAINING PROGRAM

## 2025-05-01 PROCEDURE — 83605 ASSAY OF LACTIC ACID: CPT

## 2025-05-01 PROCEDURE — 85027 COMPLETE CBC AUTOMATED: CPT

## 2025-05-01 PROCEDURE — 82330 ASSAY OF CALCIUM: CPT | Performed by: STUDENT IN AN ORGANIZED HEALTH CARE EDUCATION/TRAINING PROGRAM

## 2025-05-01 PROCEDURE — 82805 BLOOD GASES W/O2 SATURATION: CPT

## 2025-05-01 RX ORDER — METHOCARBAMOL 750 MG/1
750 TABLET, FILM COATED ORAL 4 TIMES DAILY
Status: DISCONTINUED | OUTPATIENT
Start: 2025-05-01 | End: 2025-05-01

## 2025-05-01 RX ORDER — HYDROXYZINE HYDROCHLORIDE 50 MG/1
50 TABLET, FILM COATED ORAL EVERY 6 HOURS PRN
Status: DISCONTINUED | OUTPATIENT
Start: 2025-05-01 | End: 2025-05-07 | Stop reason: HOSPADM

## 2025-05-01 RX ORDER — GABAPENTIN 100 MG/1
200 CAPSULE ORAL
Status: DISCONTINUED | OUTPATIENT
Start: 2025-05-02 | End: 2025-05-07 | Stop reason: HOSPADM

## 2025-05-01 RX ORDER — GABAPENTIN 100 MG/1
200 CAPSULE ORAL 3 TIMES DAILY
Status: DISCONTINUED | OUTPATIENT
Start: 2025-05-01 | End: 2025-05-01

## 2025-05-01 RX ORDER — METHOCARBAMOL 500 MG/1
1000 TABLET, FILM COATED ORAL 4 TIMES DAILY
Status: DISCONTINUED | OUTPATIENT
Start: 2025-05-02 | End: 2025-05-07 | Stop reason: HOSPADM

## 2025-05-01 RX ORDER — OXYCODONE HYDROCHLORIDE 10 MG/1
10 TABLET ORAL
Status: DISCONTINUED | OUTPATIENT
Start: 2025-05-01 | End: 2025-05-07 | Stop reason: HOSPADM

## 2025-05-01 RX ORDER — GABAPENTIN 100 MG/1
200 CAPSULE ORAL AT BEDTIME
Status: COMPLETED | OUTPATIENT
Start: 2025-05-01 | End: 2025-05-01

## 2025-05-01 RX ORDER — AMOXICILLIN 250 MG
2 CAPSULE ORAL 2 TIMES DAILY
Status: DISCONTINUED | OUTPATIENT
Start: 2025-05-01 | End: 2025-05-07 | Stop reason: HOSPADM

## 2025-05-01 RX ORDER — OXYCODONE HYDROCHLORIDE 5 MG/1
5 TABLET ORAL
Status: DISCONTINUED | OUTPATIENT
Start: 2025-05-01 | End: 2025-05-07 | Stop reason: HOSPADM

## 2025-05-01 RX ORDER — HYDROXYZINE HYDROCHLORIDE 25 MG/1
25 TABLET, FILM COATED ORAL EVERY 6 HOURS PRN
Status: DISCONTINUED | OUTPATIENT
Start: 2025-05-01 | End: 2025-05-07 | Stop reason: HOSPADM

## 2025-05-01 RX ADMIN — HYDROMORPHONE HYDROCHLORIDE 0.4 MG: 0.2 INJECTION, SOLUTION INTRAMUSCULAR; INTRAVENOUS; SUBCUTANEOUS at 14:51

## 2025-05-01 RX ADMIN — PANTOPRAZOLE SODIUM 40 MG: 40 TABLET, DELAYED RELEASE ORAL at 08:34

## 2025-05-01 RX ADMIN — HYDROMORPHONE HYDROCHLORIDE 0.4 MG: 0.2 INJECTION, SOLUTION INTRAMUSCULAR; INTRAVENOUS; SUBCUTANEOUS at 21:53

## 2025-05-01 RX ADMIN — HYDROXYZINE HYDROCHLORIDE 25 MG: 25 TABLET ORAL at 18:51

## 2025-05-01 RX ADMIN — ASPIRIN 81 MG CHEWABLE TABLET 81 MG: 81 TABLET CHEWABLE at 08:34

## 2025-05-01 RX ADMIN — METHOCARBAMOL 750 MG: 750 TABLET ORAL at 11:54

## 2025-05-01 RX ADMIN — CLINDAMYCIN IN 5 PERCENT DEXTROSE 900 MG: 18 INJECTION, SOLUTION INTRAVENOUS at 06:00

## 2025-05-01 RX ADMIN — HEPARIN SODIUM 5000 UNITS: 5000 INJECTION, SOLUTION INTRAVENOUS; SUBCUTANEOUS at 11:55

## 2025-05-01 RX ADMIN — METHOCARBAMOL 750 MG: 750 TABLET ORAL at 19:39

## 2025-05-01 RX ADMIN — Medication 10 MG: at 22:42

## 2025-05-01 RX ADMIN — ACETAMINOPHEN 975 MG: 325 TABLET, FILM COATED ORAL at 11:54

## 2025-05-01 RX ADMIN — GABAPENTIN 200 MG: 100 CAPSULE ORAL at 22:43

## 2025-05-01 RX ADMIN — OXYCODONE HYDROCHLORIDE 10 MG: 10 TABLET ORAL at 22:43

## 2025-05-01 RX ADMIN — OXYCODONE HYDROCHLORIDE 10 MG: 10 TABLET ORAL at 00:16

## 2025-05-01 RX ADMIN — OXYCODONE HYDROCHLORIDE 10 MG: 10 TABLET ORAL at 11:55

## 2025-05-01 RX ADMIN — OXYCODONE HYDROCHLORIDE 10 MG: 10 TABLET ORAL at 09:04

## 2025-05-01 RX ADMIN — OXYCODONE HYDROCHLORIDE 10 MG: 10 TABLET ORAL at 04:16

## 2025-05-01 RX ADMIN — DEXMEDETOMIDINE HYDROCHLORIDE 0.3 MCG/KG/HR: 400 INJECTION INTRAVENOUS at 00:18

## 2025-05-01 RX ADMIN — SENNOSIDES AND DOCUSATE SODIUM 2 TABLET: 50; 8.6 TABLET ORAL at 19:39

## 2025-05-01 RX ADMIN — HYDROMORPHONE HYDROCHLORIDE 0.4 MG: 0.2 INJECTION, SOLUTION INTRAMUSCULAR; INTRAVENOUS; SUBCUTANEOUS at 09:56

## 2025-05-01 RX ADMIN — ACETAMINOPHEN 975 MG: 325 TABLET, FILM COATED ORAL at 19:39

## 2025-05-01 RX ADMIN — HYDROMORPHONE HYDROCHLORIDE 0.4 MG: 0.2 INJECTION, SOLUTION INTRAMUSCULAR; INTRAVENOUS; SUBCUTANEOUS at 00:43

## 2025-05-01 RX ADMIN — HYDROMORPHONE HYDROCHLORIDE 0.4 MG: 0.2 INJECTION, SOLUTION INTRAMUSCULAR; INTRAVENOUS; SUBCUTANEOUS at 17:12

## 2025-05-01 RX ADMIN — ACETAMINOPHEN 975 MG: 325 TABLET, FILM COATED ORAL at 04:16

## 2025-05-01 RX ADMIN — Medication: at 08:15

## 2025-05-01 RX ADMIN — HYDROMORPHONE HYDROCHLORIDE 0.4 MG: 0.2 INJECTION, SOLUTION INTRAMUSCULAR; INTRAVENOUS; SUBCUTANEOUS at 19:13

## 2025-05-01 RX ADMIN — GABAPENTIN 200 MG: 100 CAPSULE ORAL at 19:39

## 2025-05-01 RX ADMIN — SENNOSIDES AND DOCUSATE SODIUM 1 TABLET: 50; 8.6 TABLET ORAL at 08:44

## 2025-05-01 RX ADMIN — GABAPENTIN 700 MG: 300 CAPSULE ORAL at 08:34

## 2025-05-01 RX ADMIN — SODIUM CHLORIDE, SODIUM LACTATE, POTASSIUM CHLORIDE, AND CALCIUM CHLORIDE 500 ML: .6; .31; .03; .02 INJECTION, SOLUTION INTRAVENOUS at 06:53

## 2025-05-01 RX ADMIN — GABAPENTIN 200 MG: 100 CAPSULE ORAL at 14:51

## 2025-05-01 RX ADMIN — METHOCARBAMOL 750 MG: 750 TABLET ORAL at 15:42

## 2025-05-01 RX ADMIN — OXYCODONE HYDROCHLORIDE 10 MG: 10 TABLET ORAL at 16:25

## 2025-05-01 RX ADMIN — OXYCODONE HYDROCHLORIDE 10 MG: 10 TABLET ORAL at 19:38

## 2025-05-01 RX ADMIN — Medication 1500 MG: at 08:29

## 2025-05-01 RX ADMIN — POLYETHYLENE GLYCOL 3350 17 G: 17 POWDER, FOR SOLUTION ORAL at 08:44

## 2025-05-01 RX ADMIN — SODIUM CHLORIDE, SODIUM LACTATE, POTASSIUM CHLORIDE, AND CALCIUM CHLORIDE 500 ML: .6; .31; .03; .02 INJECTION, SOLUTION INTRAVENOUS at 04:52

## 2025-05-01 RX ADMIN — METHOCARBAMOL 500 MG: 500 TABLET ORAL at 08:35

## 2025-05-01 RX ADMIN — HEPARIN SODIUM 5000 UNITS: 5000 INJECTION, SOLUTION INTRAVENOUS; SUBCUTANEOUS at 20:25

## 2025-05-01 ASSESSMENT — ACTIVITIES OF DAILY LIVING (ADL)
ADLS_ACUITY_SCORE: 37
ADLS_ACUITY_SCORE: 35
ADLS_ACUITY_SCORE: 37

## 2025-05-01 NOTE — PROGRESS NOTES
05/01/25 1106   Appointment Info   Signing Clinician's Name / Credentials (OT) Pilar Mims, OTR/L   Rehab Comments (OT) sternal prec, OT/CR   Living Environment   People in Home spouse;child(lexy), adult;child(lexy), dependent   Current Living Arrangements house   Home Accessibility stairs to enter home;stairs within home   Number of Stairs, Main Entrance 3   Stair Railings, Main Entrance none   Number of Stairs, Within Home, Primary two   Stair Railings, Within Home, Primary none   Transportation Anticipated car, drives self;family or friend will provide   Living Environment Comments Pt lives with spouse and 2 children in a house, 2 CLARISA w/o hand rail. Will only need to manage 2 steps to sunroom once inside. Pt has a walk in shower, shower chair, no grab bars.   Self-Care   Usual Activity Tolerance excellent   Current Activity Tolerance fair   Regular Exercise Yes   Activity/Exercise Type swimming   Exercise Amount/Frequency 3-5 times/wk   Equipment Currently Used at Home shower chair   Fall history within last six months yes   Number of times patient has fallen within last six months 1   Activity/Exercise/Self-Care Comment Pt reports being IND w/ ADLs and functional mobility at baseline. 1 fall d/t tripping over step, no injury. Pt owns a FWW but has not needed to use. Pt reports swimming 4-5x/week, very active at baseline.   Instrumental Activities of Daily Living (IADL)   IADL Comments Shares IADLs w/ family, family able to assist PRN   General Information   Onset of Illness/Injury or Date of Surgery 04/30/25   Referring Physician Kam Gtz MD   Patient/Family Therapy Goal Statement (OT) None stated   Additional Occupational Profile Info/Pertinent History of Current Problem Johnie Buenrostro is a 45 year old male with PMH of ADHD and mitral valve regurgitation. Presents to [facility] for minimally invasive mitral valve repair by Dr. Maxwell on 4/30/2025   Existing Precautions/Restrictions fall;cardiac;sternal  LABS REVIEWED AND WILL BE DISCUSSED WITH PATIENT AT UPCOMING OFFICE VISIT.     Left Upper Extremity (Weight-bearing Status) partial weight-bearing (PWB)  (10#)   Right Upper Extremity (Weight-bearing Status) partial weight-bearing (PWB)  (10#)   General Observations and Info Activity: ICU early mobility   Cognitive Status Examination   Orientation Status orientation to person, place and time   Cognitive Status Comments No acute deficits, will monitor   Visual Perception   Visual Impairment/Limitations WFL;corrective lenses for reading   Impact of Vision Impairment on Function (Vision) No acute deficits, will monitor   Sensory   Sensory Quick Adds sensation intact   Sensory Comments Mild n/t in R digits   Pain Assessment   Patient Currently in Pain Yes, see Vital Sign flowsheet   Posture   Posture not impaired   Range of Motion Comprehensive   General Range of Motion no range of motion deficits identified   Strength Comprehensive (MMT)   Comment, General Manual Muscle Testing (MMT) Assessment Generalized post-op weakness   Coordination   Upper Extremity Coordination No deficits were identified   Bed Mobility   Bed Mobility supine-sit   Supine-Sit [place] (Bed Mobility) contact guard   Transfers   Transfers sit-stand transfer   Sit-Stand Transfer   Sit-Stand [place] (Transfers) contact guard   Activities of Daily Living   BADL Assessment/Intervention bathing;upper body dressing;lower body dressing;grooming;toileting   Bathing Assessment/Intervention   [place] Level (Bathing) minimum assist (75% patient effort)   Comment, (Bathing) Per clinical judgment   Upper Body Dressing Assessment/Training   Comment, (Upper Body Dressing) Per clinical judgment   [place] Level (Upper Body Dressing) moderate assist (50% patient effort)   Lower Body Dressing Assessment/Training   [place] Level (Lower Body Dressing) maximum assist (25% patient effort)   Grooming Assessment/Training   [place] Level (Grooming) minimum assist (75% patient effort)   Comment, (Grooming) Per clinical  judgment   Toileting   Comment, (Toileting) Per clinical judgment   [place] Level (Toileting) minimum assist (75% patient effort)   Clinical Impression   Criteria for Skilled Therapeutic Interventions Met (OT) Yes, treatment indicated   OT Diagnosis Decreased ADL/IADL I   OT Problem List-Impairments impacting ADL problems related to;strength;pain;activity tolerance impaired;fear & anxiety;mobility;post-surgical precautions   Assessment of Occupational Performance 5 or more Performance Deficits   Identified Performance Deficits Dressing, bathing, toileting, g/h, home mgmt   Planned Therapy Interventions (OT) ADL retraining;IADL retraining;home program guidelines;progressive activity/exercise;risk factor education;strengthening;transfer training   Clinical Decision Making Complexity (OT) detailed assessment/moderate complexity   Risk & Benefits of therapy have been explained evaluation/treatment results reviewed;care plan/treatment goals reviewed;risks/benefits reviewed;current/potential barriers reviewed;participants voiced agreement with care plan;participants included;patient   Clinical Impression Comments Pt willl benefit from skilled OT services to progress IND w/ ADLs/IADLs and facilitate return to PLOF.   OT Total Evaluation Time   OT Eval, Moderate Complexity Minutes (82659) 5   OT Goals   Therapy Frequency (OT) 6 times/week   OT Predicted Duration/Target Date for Goal Attainment 05/31/25   OT Goals Hygiene/Grooming;Upper Body Dressing;Lower Body Dressing;Upper Body Bathing;Lower Body Bathing;Toilet Transfer/Toileting;Cardiac Phase 1   OT: Hygiene/Grooming modified independent;within precautions   OT: Upper Body Dressing Modified independent;within precautions   OT: Lower Body Dressing Modified independent;within precautions   OT: Upper Body Bathing Modified independent;within precautions   OT: Lower Body Bathing Modified independent;with precautions   OT: Toilet Transfer/Toileting Modified  independent;within precautions;toilet transfer;cleaning and garment management;using adaptive equipment   OT: Understanding of cardiac education to maximize quality of life, condition management, and health outcomes Patient;Caregiver;Verbalize;Demonstrate   OT: Perform aerobic activity with stable cardiovascular response continuous;10 minutes;ambulation;NuStep;treadmill   OT: Functional/aerobic ambulation tolerance with stable cardiovascular response in order to return to home and community environment Modified independent;Within precautions;Greater than 300 feet   OT: Navigation of stairs simulating home set up with stable cardiovascular response in order to return to home and community environment Modified independent;3 stairs   Interventions   Interventions Quick Adds Therapeutic Activity;Cardiac Rehab   Therapeutic Activities   Therapeutic Activity Minutes (10403) 24   Cardiac Education   Education Provided Precautions   Education Packet Given to Patient No   All Patient Education Handouts Reviewed with Patient and/or Family No   OT Discharge Planning   OT Plan Review precautions, ambulation (anticipate no PT needs), CR folder, standing ADLs, toileting   OT Discharge Recommendation (DC Rec) home with assist;home with outpatient cardiac rehab   OT Rationale for DC Rec Pt presents below basline, limited by weakness, deconditioning and post-op pain/precautions. Pt progressing well POD 1, pending further progression anticipate pt will be safe to d/c home w/ A when medically ready. Will monitor and update recs as indicated.   OT Brief overview of current status CGA   OT Total Distance Amb During Session (feet) 3   Total Session Time   Timed Code Treatment Minutes 24   Total Session Time (sum of timed and untimed services) 29

## 2025-05-01 NOTE — PHARMACY-ADMISSION MEDICATION HISTORY
Pharmacist Admission Medication History    Admission medication history is complete. The information provided in this note is only as accurate as the sources available at the time of the update.    Information Source(s): Patient and CareEverywhere/SureScripts via in-person    Pertinent Information: None    Changes made to PTA medication list:  Added: None  Deleted: None  Changed: None    Allergies reviewed with patient and updates made in EHR: yes    Medication History Completed By: Gunjan White PharmD 5/1/2025 7:46 AM    PTA Med List   Medication Sig Last Dose/Taking    amphetamine-dextroamphetamine (ADDERALL XR) 10 MG 24 hr capsule Take 10 mg by mouth every morning. 4/26/2025    amphetamine-dextroamphetamine (ADDERALL) 15 MG tablet Take 0.5 tablets by mouth every morning. 4/29/2025 at  6:00 AM    cholecalciferol (VITAMIN D3) 125 mcg (5000 units) capsule Take 125 mcg by mouth every morning. Past Month    naproxen sodium (ANAPROX) 220 MG tablet Take 220 mg by mouth 2 times daily as needed for moderate pain. More than a month

## 2025-05-01 NOTE — PROGRESS NOTES
Illness Severity: {iPASSseverity:340474}45 year old male with PMH of ADHD and mitral valve regurgitation. Presents to [facility] for minimally invasive mitral valve repair.    Major shift events:  Extubated.  In extreme pain; all prn's given, dex restarted.  PV block occluded; anesthesia flushed, now working well.    Neuro:  Fully intact.  Perrla.    Cardiac:  SR, no ectopy.  SBP<140.      Pulm:  Extubated, 2L NC.  Lungs clear.  CT x2 with ~ 120mL this shift    GI/:  Regular diet.  Rojas with adequate uop.    Drips:    Precedex @ 0.3    Plan:  Dex overnight for right shoulder/back pain.

## 2025-05-01 NOTE — PROGRESS NOTES
Pain Service Progress Note  [facility]  Date: 05/01/2025       Patient Name: Johnie Buenrostro  MRN: [id number]  Age: 45 year old  Sex: male      Assessment:  Johnie Buenrostro is a 45 year old male who has PMH of ADHD and mitral valve regurgitation and is now s/p minimally invasive mitral valve repair by Dr. Maxwell on 4/30/2025.         Date of Catheter Placement: 4/30/25    Plan/Recommendations:  1. Regional Anesthesia/Analgesia  -Continuous Catheter Type/Site: right paravertebral (PV) T6-7  Infusate: ropivacaine 0.2%    Programmed Intermittent Bolus (PIB) at 12 mL Q60 min via each catheter, total infusion rate of 12 mL/hr  (this was increased during morning rounds on 5/1/25)    - on 5/1/25 at approximately 0748, 5ml bolus was given via CADD Pump.  - update: afternoon rounds, Lynnette pain is improved since this morning.     Plan to maintain catheter approximately of 7 days    2. Anticoagulation  -Please contact Inpatient Pain Service before ordering or making any anticoagulation changes  heparin ANTICOAGULANT injection 5,000 Units    5,000 Units, SC, Q8H        3. Multimodal Analgesia  -opioid naive at baseline  - per primary service: oxycodone 10mg and IV Dilaudid 0.4 mg    Pain Service will continue to follow.    Discussed with attending anesthesiologist    Emy Garza PA-C  05/01/2025     Overnight Events: challenging pain    Tubes/Drains: yes, chest tubes    Subjective: States pain is not managed    Symptoms of LAST: No    Pain Location:  Right shoulder/ shoulder blade    Pain Intensity:    Pain at Rest: 6/10   Pain with Activity: 9/10  Comfort Goal: 0-4/10   Baseline Pain: 0/10   Satisfied with your level of pain control: Yes-in the afternoon    Diet: Advance Diet as Tolerated: Regular Diet Adult    Relevant Labs:  Recent Labs   Lab Test 05/01/25  0357 04/30/25 2004 04/30/25  1310   PROTIME  --   --  16.4*   INR  --   --  1.33*      < > 170   PTT  --   --  27   BUN 18.4   < >  "16.4    < > = values in this interval not displayed.       Physical Exam:  Vitals: /70 (BP Location: Right arm)   Pulse 81   Temp 97.9  F (36.6  C)   Resp 14   Ht 1.803 m (5' 11\")   Wt 86.9 kg (191 lb 9.3 oz)   SpO2 100%   BMI 26.72 kg/m      Physical Exam:   Orientation:  Alert, oriented, and in no acute distress: Yes  Sedation: No    Motor Examination:  5/5 Strength in lower extremities: Yes    Catheter Site:   Catheter entry site is clean/dry/intact: Yes    Tender: No      Relevant Medications:  Current Pain Medications:  Medications related to Pain Management (From now, onward)      Start     Dose/Rate Route Frequency Ordered Stop    05/03/25 0000  bisacodyl (DULCOLAX) suppository 10 mg         10 mg Rectal DAILY PRN 04/30/25 1256      05/02/25 0000  magnesium hydroxide (MILK OF MAGNESIA) suspension 30 mL         30 mL Oral DAILY PRN 04/30/25 1256      05/01/25 2000  senna-docusate (SENOKOT-S/PERICOLACE) 8.6-50 MG per tablet 2 tablet         2 tablet Oral 2 TIMES DAILY 05/01/25 1007      05/01/25 1400  gabapentin (NEURONTIN) capsule 200 mg         200 mg Oral 3 TIMES DAILY 05/01/25 1016      05/01/25 1300  ROPivacaine 0.2% in sodium chloride 0.9% PERINEURAL infusion          Perineural Continuous Nerve Block 05/01/25 1246      05/01/25 1200  methocarbamol (ROBAXIN) tablet 750 mg         750 mg Oral 4 TIMES DAILY 05/01/25 1016      05/01/25 1009  hydrOXYzine HCl (ATARAX) tablet 25 mg        Placed in \"Or\" Linked Group    25 mg Oral EVERY 6 HOURS PRN 05/01/25 1016      05/01/25 1009  hydrOXYzine HCl (ATARAX) tablet 50 mg        Placed in \"Or\" Linked Group    50 mg Oral EVERY 6 HOURS PRN 05/01/25 1016      05/01/25 0824  oxyCODONE (ROXICODONE) tablet 5 mg        Placed in \"Or\" Linked Group    5 mg Oral EVERY 3 HOURS PRN 05/01/25 0824      05/01/25 0824  oxyCODONE IR (ROXICODONE) tablet 10 mg        Placed in \"Or\" Linked Group    10 mg Oral EVERY 3 HOURS PRN 05/01/25 0824      05/01/25 0800  " "polyethylene glycol (MIRALAX) Packet 17 g         17 g Oral DAILY 04/30/25 1256      05/01/25 0800  aspirin (ASA) chewable tablet 81 mg         81 mg Oral or NG Tube DAILY 04/30/25 1256      04/30/25 1300  acetaminophen (TYLENOL) tablet 975 mg         975 mg Oral EVERY 8 HOURS 04/30/25 1256      04/30/25 1256  HYDROmorphone (DILAUDID) injection 0.2 mg        Placed in \"Or\" Linked Group    0.2 mg Intravenous EVERY 2 HOURS PRN 04/30/25 1256      04/30/25 1256  HYDROmorphone (DILAUDID) injection 0.4 mg        Placed in \"Or\" Linked Group    0.4 mg Intravenous EVERY 2 HOURS PRN 04/30/25 1256      04/30/25 1256  lidocaine 1 % 0.1-1 mL         0.1-1 mL Other EVERY 1 HOUR PRN 04/30/25 1256      04/30/25 1256  lidocaine (LMX4) cream          Topical EVERY 1 HOUR PRN 04/30/25 1256              Primary Service Contacted with Recommendations? Yes            Acute Inpatient Pain Service [facility]  Hours of pain coverage 24/7   Please Page via Vocera  - Link to Vocera Here - Search Pain  Page via Am[employer]- Please Page the Pain Team Via Am[employer]: \"PAIN MANAGEMENT ACUTE INPATIENT/ [facility]\"            "

## 2025-05-01 NOTE — PROGRESS NOTES
CV ICU PROGRESS NOTE  05/01/2025        ASSESSMENT: Johnie Buenrostro is a 45 year old male s/p minimally invasive mitral valve repair, POD 1  by Dr. Maxwell on 4/30/2025       CO-MORBIDITIES:   Nonrheumatic mitral valve regurgitation  (primary encounter diagnosis)    CHANGES and MAJOR Updates  - Discontinue Precedex  - Increase oxycodone frequency  - Swanz out   - Add scheduled Gabapentin 200 mg TID and robaxin   - Discontinue insulin gtt   - Remove arterial line, luke, and PA cath   - Net even   - Discontinue lactate   - Discontinue ABG      PLAN:  NEURO  # Acute postoperative pain  # Shoulder pain, resolving  # Right, last two digits numbness, resolving  - Monitor neurological status. Notify the MD for any acute changes in exam.  - Scheduled: Tylenol   - PRN: Dilaudid, oxycodone, gabapentin 200 TID  and robaxin  - Regional anesthesia: ropivacaine 0.2%. Plan to maintain for 7 days.  - Discontinue Precedex  - Okay to give Toradol 15mg BID if pain worsens     RESP  # Acute hypoxic respiratory insufficiency   - Extubated 4/30 to RA  - CXR with enlarged medistinum, bibasilar opacities   - Goal SpO2 > 92%  - Incentive spirometer Q15-30 minutes when awake.    CV  # Cardiogenic shock  # S/p mini MVR  Recent echo on 4/30/25 with LVEF of 55% with no regional wall abnormality   Post CBP ARMANDO no systolic anterior motion or paravalvular leak.  - Monitor hemodynamic status.   - Goal MAP >65, SBP <140  - ASA 81 mg started  - Off pressor support  - Monitor hemodynamic status    FEN/GI  - Regular diet   - PPI for bowel prophylaxis  - Bowel regimen: MiraLAX, senna  - ICU electrolyte replacement protocol    LIANNE/   - BL creat appears to be ~ 0.83. Cr 0.73  - Urine output 920 mL  - Strict I/O, daily weights  - Avoid/limit nephrotoxins as able  - Replete lytes PRN per protocol    HEME/ID  #Stress induced leukocytosis   -Afebrile, improved leukocytosis  - No s/sx infection  - No indication for antibiotics.    # Acute blood loss  "anemia  - Hgb goal > 7.0  - Hemoglobin stable at 13  - No thrombocytopenia    ENDO  # Stress hyperglycemia  - Preop A1c 5.1   - No management indication  - Goal BG <180 for optimal healing   - Continue POCT     MSK/Skin  # Surgical incision  - No acute concerns  - Continue diligent skin care to prevent further breakdown  - PT/OT    Prophylaxis:    - VTE: SCD's, heparin 5000u sq  - Bowel regimen: PPI, MiraLAX, senna    Lines/ tubes/ drains:  - RIJ CVC, PA catheter  - CTs x2  - Rojas, OG    Disposition:  - CVICU    Discussed plan today with patient and their family member. All questions were answered. They are in agreement with plan.     Patient seen, findings and plan discussed with CVICU staff and cardiothoracic surgeons.    Total Critical Care time spent by me, excluding procedures, was 60 minutes.    Ming Loyola MD  Urology PGY-1 on CVICU      Clinically Significant Risk Factors           # Hypocalcemia: Lowest Ca = 8.2 mg/dL in last 2 days, will monitor and replace as appropriate        # Coagulation Defect: INR = 1.33 (Ref range: 0.85 - 1.15) and/or PTT = 27 Seconds (Ref range: 22 - 38 Seconds), will monitor for bleeding               # Overweight: Estimated body mass index is 26.72 kg/m  as calculated from the following:    Height as of this encounter: 1.803 m (5' 11\").    Weight as of this encounter: 86.9 kg (191 lb 9.3 oz)., PRESENT ON ADMISSION                     ====================================    TODAY'S PROGRESS  SUBJECTIVE  Doing well, pain is improved. Denies worsening chest pain, nausea or emesis. Moving from bed to chair.      OBJECTIVE  1. VITAL SIGNS  Temp:  [97  F (36.1  C)-99  F (37.2  C)] 97.9  F (36.6  C)  Pulse:  [76-95] 78  Resp:  [13-18] 16  BP: (113)/(83) 113/83  MAP:  [62 mmHg-98 mmHg] 77 mmHg  Arterial Line BP: ()/(49-80) 105/62  FiO2 (%):  [30 %-40 %] 30 %  SpO2:  [92 %-100 %] 99 %  FiO2 (%): 30 %, Resp: 16, Vent Mode: CPAP/PS, Resp Rate (Set): 18 breaths/min, Tidal " Volume (Set, mL): 450 mL, PEEP (cm H2O): 5 cmH2O, Pressure Support (cm H2O): 5 cmH2O, Resp Rate (Set): 18 breaths/min, Tidal Volume (Set, mL): 450 mL, PEEP (cm H2O): 5 cmH2O    2. INTAKE/ OUTPUT  I/O last 3 completed shifts:  In: 3912.56 [P.O.:600; I.V.:2212.56; Other:600; IV Piggyback:500]  Out: 1532 [Urine:1232; Chest Tube:300]    3. PHYSICAL EXAMINATION    Neuro: Awake. Alert to person, place, and time. Follows commands. NAD.   HEENT: Normocephalic, atraumatic and nonicteric.   CV: RRR on monitor, S1S2, all extremities well perfused   Respiratory: CTAB, Normal respiratory effort on RA, equal chest rise b/l. Chest tube to suction, serosang output.   GI: Abdomen soft and non-tender to light palpation. Non-distended   : Voiding with Rojas   MSK: Moves all extremities well with normal appearing strength.      Skin:  Incisional dressing c/d/i   Psych: mood and affect normal.       4. INVESTIGATIONS  Arterial Blood Gases   Recent Labs   Lab 05/01/25  0402 04/30/25 2003 04/30/25  1310 04/30/25  1118   PH 7.38 7.36 7.38 7.39   PCO2 44 45 44 44   PO2 102 103 83 307*   HCO3 26 26 26 26     Complete Blood Count   Recent Labs   Lab 05/01/25  0357 04/30/25 2004 04/30/25  1310 04/30/25  1151 04/30/25  1118   WBC 10.6 10.1 11.6*  --   --    HGB 12.5* 13.3 12.7*  --  14.3    173 170 194  --      Basic Metabolic Panel  Recent Labs   Lab 05/01/25  0842 05/01/25  0552 05/01/25  0403 05/01/25  0357 04/30/25 2004 04/30/25  1614 04/30/25  1310 04/30/25  1309 04/30/25  1118   NA  --   --   --  136 137  --  141  --  142   POTASSIUM  --   --   --  4.6 4.6  --  4.2  --  4.5   CHLORIDE  --   --   --  102 103  --  106  --   --    CO2  --   --   --  24 23  --  23  --   --    BUN  --   --   --  18.4 18.7  --  16.4  --   --    CR  --   --   --  0.73 0.77  --  0.89  --   --    * 127* 136* 135* 160*   < > 155*   < > 140*    < > = values in this interval not displayed.     Liver Function Tests  Recent Labs   Lab 05/01/25  8166  "04/30/25 2004 04/30/25  1310 04/30/25  1151   AST 69* 81* 74*  --    ALT 22 25 23  --    ALKPHOS 47 51 45  --    BILITOTAL 1.1 1.6* 1.2  --    ALBUMIN 3.9 4.2 3.8  --    INR  --   --  1.33* 1.33*     Pancreatic Enzymes  No lab results found in last 7 days.  Coagulation Profile  Recent Labs   Lab 04/30/25  1310 04/30/25  1151   INR 1.33* 1.33*   PTT 27 21*     Lactate  Invalid input(s): \"LACTATE\"    5. RADIOLOGY  Recent Results (from the past 24 hours)   XR Chest Port 1 View    Narrative    Exam: XR CHEST PORT 1 VIEW, 4/30/2025 1:41 PM    Comparison: Cardiac CTA 3/31/2025    History: Post Op CVTS Surgery    Findings:  Endotracheal tube tip projects over the midthoracic trachea. Right IJ  [place]-Andres catheter tip projects over the proximal right pulmonary  artery. Enteric tube tip and sidehole project over the stomach. Two  right-sided chest tubes present. Cardiomediastinal silhouette is  mildly enlarged. Mitral valve prosthesis. Right azygos lobe. No  appreciable pneumothorax. Mild streaky right basilar opacities. No  pleural effusion.      Impression    Impression: Postoperative mitral valve repair.   1. Endotracheal tube tip projects over the mid thoracic trachea.  Additional support devices as described in the body of report.  2. Mild streaky right basilar atelectasis. No appreciable  pneumothorax.    I have personally reviewed the examination and initial interpretation  and I agree with the findings.    HUBER DEAN MD         SYSTEM ID:  [id number]   XR Abdomen 1 View    Narrative    EXAMINATION:  XR ABDOMEN 1 VIEW 4/30/2025 1:41 PM.    COMPARISON: None.    HISTORY:  OG in place    FINDINGS:   Enteric tube tip projects over the stomach, the sidehole projects near  the gastroesophageal junction. Nonobstructive bowel gas pattern.  Moderate colonic stool burden. No pneumatosis or portal venous gas. No  acute osseous abnormality. Please see same day chest radiograph for  dictation of thoracic findings.       " Impression    IMPRESSION:   Enteric tube tip projects over the stomach, the sidehole projects near  the gastroesophageal junction. Consider advancement.    I have personally reviewed the examination and initial interpretation  and I agree with the findings.    LAWSON SAINZ DO         SYSTEM ID:  [id number]   XR Chest Port 1 View    Narrative    EXAM: XR CHEST PORT 1 VIEW  5/1/2025 1:57 AM     HISTORY:  Post Op CVTS Surgery       COMPARISON:  4/30/2025    FINDINGS:     Interval removal of endotracheal and enteric tubes. Stable right IJ  [place]-Andres catheter, and chest tubes.    Trachea is midline. Cardiac valve prosthesis. Cardiomediastinal  silhouette and pulmonary vasculature are stable. Bibasilar streaky  pulmonary opacities, minimally increased compared to prior. No pleural  effusion. No discernible pneumothorax.    No acute osseous abnormality. Visualized upper abdomen is  unremarkable.        Impression    IMPRESSION:    1. Interval removal of endotracheal and enteric tubes with otherwise  stable support devices.  2. Mildly increased bibasilar streaky opacities, favoring atelectasis.    I have personally reviewed the examination and initial interpretation  and I agree with the findings.    AMANDA ISBELL MD         SYSTEM ID:  [id number]

## 2025-05-01 NOTE — PROGRESS NOTES
"CLINICAL NUTRITION SERVICES - BRIEF NOTE    Received provider consult for nutrition education with comments post op cardiovascular surgery (automatic consult on post-op order set). S/p MVR on 4/30. Nutrition education not indicated.    RD will follow per LOS protocol or if re-consulted.     Lore Best, MS, RD, LD  Available on [employer] - \"4A Clinical Dietitian\"  Weekend/Holiday RD - \"Weekend Clinical Dietitian\"  "

## 2025-05-01 NOTE — PLAN OF CARE
Major shift events:  None    Neuro:  Fully intact. Perrla. Numbness/tingling right pinky/ring finger - team aware. PRNs given for pain management. Afebrile.    Cardiac:  SR, no ectopy. HR 70-80s. SBP<140.    [place] @ 53.5; 0400 SHANKAR PA 23/10 - CVP 9 - CI 2.3 -  - SvO2 74  1 liter LR bolus given for MAP goal >65    Pulm:  2L NC overnight.  Lungs clear/diminished.  CT x2 with ~ 20ml/hr    GI/:  Regular diet.  Rojas with adequate uop.    Drips:    Precedex @ 0.3  Ropivacaine @ 10 ml/hr    For vital signs and complete assessments, please see documentation flowsheets.     Goal Outcome Evaluation:      Plan of Care Reviewed With: patient    Overall Patient Progress: improvingOverall Patient Progress: improving    Outcome Evaluation: No pressors requirements, pain well managed with meds

## 2025-05-02 ENCOUNTER — APPOINTMENT (OUTPATIENT)
Dept: OCCUPATIONAL THERAPY | Facility: CLINIC | Age: 45
End: 2025-05-02
Attending: SURGERY
Payer: COMMERCIAL

## 2025-05-02 ENCOUNTER — APPOINTMENT (OUTPATIENT)
Dept: GENERAL RADIOLOGY | Facility: CLINIC | Age: 45
End: 2025-05-02
Payer: COMMERCIAL

## 2025-05-02 LAB
ALBUMIN SERPL BCG-MCNC: 3.9 G/DL (ref 3.5–5.2)
ALP SERPL-CCNC: 45 U/L (ref 40–150)
ALT SERPL W P-5'-P-CCNC: 21 U/L (ref 0–70)
ANION GAP SERPL CALCULATED.3IONS-SCNC: 9 MMOL/L (ref 7–15)
AST SERPL W P-5'-P-CCNC: 50 U/L (ref 0–45)
ATRIAL RATE - MUSE: 83 BPM
ATRIAL RATE - MUSE: 87 BPM
ATRIAL RATE - MUSE: 88 BPM
BILIRUB SERPL-MCNC: 0.7 MG/DL
BUN SERPL-MCNC: 9.6 MG/DL (ref 6–20)
CA-I BLD-MCNC: 4.6 MG/DL (ref 4.4–5.2)
CALCIUM SERPL-MCNC: 8.7 MG/DL (ref 8.8–10.4)
CHLORIDE SERPL-SCNC: 102 MMOL/L (ref 98–107)
CREAT SERPL-MCNC: 0.65 MG/DL (ref 0.67–1.17)
DIASTOLIC BLOOD PRESSURE - MUSE: NORMAL MMHG
EGFRCR SERPLBLD CKD-EPI 2021: >90 ML/MIN/1.73M2
ERYTHROCYTE [DISTWIDTH] IN BLOOD BY AUTOMATED COUNT: 12.9 % (ref 10–15)
GLUCOSE SERPL-MCNC: 114 MG/DL (ref 70–99)
HCO3 SERPL-SCNC: 26 MMOL/L (ref 22–29)
HCT VFR BLD AUTO: 36.9 % (ref 40–53)
HGB BLD-MCNC: 12.5 G/DL (ref 13.3–17.7)
INTERPRETATION ECG - MUSE: NORMAL
MCH RBC QN AUTO: 30.7 PG (ref 26.5–33)
MCHC RBC AUTO-ENTMCNC: 33.9 G/DL (ref 31.5–36.5)
MCV RBC AUTO: 91 FL (ref 78–100)
P AXIS - MUSE: 19 DEGREES
P AXIS - MUSE: 30 DEGREES
P AXIS - MUSE: 42 DEGREES
PLATELET # BLD AUTO: 155 10E3/UL (ref 150–450)
POTASSIUM SERPL-SCNC: 4.2 MMOL/L (ref 3.4–5.3)
PR INTERVAL - MUSE: 142 MS
PR INTERVAL - MUSE: 148 MS
PR INTERVAL - MUSE: 152 MS
PROT SERPL-MCNC: 6.4 G/DL (ref 6.4–8.3)
QRS DURATION - MUSE: 72 MS
QRS DURATION - MUSE: 80 MS
QRS DURATION - MUSE: 90 MS
QT - MUSE: 358 MS
QT - MUSE: 360 MS
QT - MUSE: 382 MS
QTC - MUSE: 423 MS
QTC - MUSE: 430 MS
QTC - MUSE: 462 MS
R AXIS - MUSE: -12 DEGREES
R AXIS - MUSE: -13 DEGREES
R AXIS - MUSE: 12 DEGREES
RBC # BLD AUTO: 4.07 10E6/UL (ref 4.4–5.9)
SODIUM SERPL-SCNC: 137 MMOL/L (ref 135–145)
SYSTOLIC BLOOD PRESSURE - MUSE: NORMAL MMHG
T AXIS - MUSE: -2 DEGREES
T AXIS - MUSE: -5 DEGREES
T AXIS - MUSE: -8 DEGREES
VENTRICULAR RATE- MUSE: 83 BPM
VENTRICULAR RATE- MUSE: 87 BPM
VENTRICULAR RATE- MUSE: 88 BPM
WBC # BLD AUTO: 10.7 10E3/UL (ref 4–11)

## 2025-05-02 PROCEDURE — 85027 COMPLETE CBC AUTOMATED: CPT

## 2025-05-02 PROCEDURE — 250N000013 HC RX MED GY IP 250 OP 250 PS 637: Performed by: STUDENT IN AN ORGANIZED HEALTH CARE EDUCATION/TRAINING PROGRAM

## 2025-05-02 PROCEDURE — 71045 X-RAY EXAM CHEST 1 VIEW: CPT | Mod: 26 | Performed by: STUDENT IN AN ORGANIZED HEALTH CARE EDUCATION/TRAINING PROGRAM

## 2025-05-02 PROCEDURE — 250N000011 HC RX IP 250 OP 636: Mod: JZ | Performed by: STUDENT IN AN ORGANIZED HEALTH CARE EDUCATION/TRAINING PROGRAM

## 2025-05-02 PROCEDURE — 99232 SBSQ HOSP IP/OBS MODERATE 35: CPT | Performed by: PHYSICIAN ASSISTANT

## 2025-05-02 PROCEDURE — 250N000013 HC RX MED GY IP 250 OP 250 PS 637: Performed by: PHYSICIAN ASSISTANT

## 2025-05-02 PROCEDURE — 97535 SELF CARE MNGMENT TRAINING: CPT | Mod: GO

## 2025-05-02 PROCEDURE — 250N000013 HC RX MED GY IP 250 OP 250 PS 637

## 2025-05-02 PROCEDURE — 82330 ASSAY OF CALCIUM: CPT | Performed by: STUDENT IN AN ORGANIZED HEALTH CARE EDUCATION/TRAINING PROGRAM

## 2025-05-02 PROCEDURE — 250N000011 HC RX IP 250 OP 636: Mod: JZ | Performed by: ANESTHESIOLOGY

## 2025-05-02 PROCEDURE — 120N000005 HC R&B MS OVERFLOW UMMC

## 2025-05-02 PROCEDURE — 80053 COMPREHEN METABOLIC PANEL: CPT

## 2025-05-02 PROCEDURE — 999N000157 HC STATISTIC RCP TIME EA 10 MIN

## 2025-05-02 PROCEDURE — 71045 X-RAY EXAM CHEST 1 VIEW: CPT

## 2025-05-02 PROCEDURE — 94660 CPAP INITIATION&MGMT: CPT

## 2025-05-02 PROCEDURE — 271N000002 HC RX 271: Performed by: ANESTHESIOLOGY

## 2025-05-02 RX ADMIN — METHOCARBAMOL 1000 MG: 500 TABLET ORAL at 12:50

## 2025-05-02 RX ADMIN — OXYCODONE HYDROCHLORIDE 10 MG: 10 TABLET ORAL at 20:45

## 2025-05-02 RX ADMIN — HYDROMORPHONE HYDROCHLORIDE 0.4 MG: 0.2 INJECTION, SOLUTION INTRAMUSCULAR; INTRAVENOUS; SUBCUTANEOUS at 23:25

## 2025-05-02 RX ADMIN — POLYETHYLENE GLYCOL 3350 17 G: 17 POWDER, FOR SOLUTION ORAL at 07:55

## 2025-05-02 RX ADMIN — OXYCODONE HYDROCHLORIDE 10 MG: 10 TABLET ORAL at 13:05

## 2025-05-02 RX ADMIN — Medication 10 MG: at 20:44

## 2025-05-02 RX ADMIN — ACETAMINOPHEN 975 MG: 325 TABLET, FILM COATED ORAL at 20:44

## 2025-05-02 RX ADMIN — HEPARIN SODIUM 5000 UNITS: 5000 INJECTION, SOLUTION INTRAVENOUS; SUBCUTANEOUS at 04:45

## 2025-05-02 RX ADMIN — OXYCODONE HYDROCHLORIDE 10 MG: 10 TABLET ORAL at 02:09

## 2025-05-02 RX ADMIN — METHOCARBAMOL 1000 MG: 500 TABLET ORAL at 07:55

## 2025-05-02 RX ADMIN — HEPARIN SODIUM 5000 UNITS: 5000 INJECTION, SOLUTION INTRAVENOUS; SUBCUTANEOUS at 20:44

## 2025-05-02 RX ADMIN — SENNOSIDES AND DOCUSATE SODIUM 2 TABLET: 50; 8.6 TABLET ORAL at 07:55

## 2025-05-02 RX ADMIN — ACETAMINOPHEN 975 MG: 325 TABLET, FILM COATED ORAL at 12:50

## 2025-05-02 RX ADMIN — Medication: at 06:03

## 2025-05-02 RX ADMIN — GABAPENTIN 200 MG: 100 CAPSULE ORAL at 08:11

## 2025-05-02 RX ADMIN — HEPARIN SODIUM 5000 UNITS: 5000 INJECTION, SOLUTION INTRAVENOUS; SUBCUTANEOUS at 12:50

## 2025-05-02 RX ADMIN — METHOCARBAMOL 1000 MG: 500 TABLET ORAL at 20:45

## 2025-05-02 RX ADMIN — GABAPENTIN 400 MG: 300 CAPSULE ORAL at 20:44

## 2025-05-02 RX ADMIN — METHOCARBAMOL 1000 MG: 500 TABLET ORAL at 16:16

## 2025-05-02 RX ADMIN — HYDROMORPHONE HYDROCHLORIDE 0.4 MG: 0.2 INJECTION, SOLUTION INTRAMUSCULAR; INTRAVENOUS; SUBCUTANEOUS at 07:46

## 2025-05-02 RX ADMIN — ACETAMINOPHEN 975 MG: 325 TABLET, FILM COATED ORAL at 04:45

## 2025-05-02 RX ADMIN — OXYCODONE HYDROCHLORIDE 10 MG: 10 TABLET ORAL at 17:17

## 2025-05-02 RX ADMIN — OXYCODONE HYDROCHLORIDE 10 MG: 10 TABLET ORAL at 05:12

## 2025-05-02 RX ADMIN — ASPIRIN 81 MG CHEWABLE TABLET 81 MG: 81 TABLET CHEWABLE at 07:55

## 2025-05-02 RX ADMIN — GABAPENTIN 200 MG: 100 CAPSULE ORAL at 14:27

## 2025-05-02 RX ADMIN — OXYCODONE HYDROCHLORIDE 10 MG: 10 TABLET ORAL at 08:51

## 2025-05-02 ASSESSMENT — ACTIVITIES OF DAILY LIVING (ADL)
ADLS_ACUITY_SCORE: 34
ADLS_ACUITY_SCORE: 39
ADLS_ACUITY_SCORE: 38
ADLS_ACUITY_SCORE: 34
ADLS_ACUITY_SCORE: 38
ADLS_ACUITY_SCORE: 34
ADLS_ACUITY_SCORE: 39
ADLS_ACUITY_SCORE: 38
ADLS_ACUITY_SCORE: 34
ADLS_ACUITY_SCORE: 38
ADLS_ACUITY_SCORE: 38
ADLS_ACUITY_SCORE: 34
ADLS_ACUITY_SCORE: 39
ADLS_ACUITY_SCORE: 39
ADLS_ACUITY_SCORE: 38

## 2025-05-02 NOTE — PROGRESS NOTES
Pt extubated 4/30 to RA. RT been administering VersaPAP with moderate to high flow rate, tolerated well. Pt working with PT and transferred to floors this afternoon. RT  discontinued the order.     Mendy Keating RT

## 2025-05-02 NOTE — PROGRESS NOTES
Transferred to: Unit 6C  at (1430)  Belongings: Sent with pt and family  Rojas removed? Yes  Central line removed? Yes  Chart and medications sent with patient Yes  Family notified: Yes

## 2025-05-02 NOTE — PROGRESS NOTES
CV ICU PROGRESS NOTE  05/02/2025        ASSESSMENT: Johnie Buenrostro is a 45 year old male s/p minimally invasive mitral valve repair, POD 2  by Dr. Maxwell on 4/30/2025       CO-MORBIDITIES:   Nonrheumatic mitral valve regurgitation  (primary encounter diagnosis)    CHANGES and MAJOR Updates  - Remove luke  - Robaxin 1000 mg Q6H  - Gabapentin 200 mg BID + 400 mg at bedtime   - Melatonin 10 mg at bedtime  - Floor transfer       PLAN:  NEURO  # Acute postoperative pain  # Shoulder pain, resolving  # Right, last two digits numbness, resolving  - Scheduled: Tylenol   - PRN: Dilaudid, oxycodone, gabapentin and robaxin  - Regional anesthesia: ropivacaine 0.2%. Plan to maintain for 7 days.     RESP  # Acute hypoxic respiratory insufficiency   - Extubated 4/30 to RA  - CXR demonstrates stable bibasilar opacities   - Goal SpO2 > 92%  - Incentive spirometer Q15-30 minutes when awake.    CV  # Cardiogenic shock, resolved  # S/p mini MVR  Recent echo on 4/30/25 with LVEF of 55% with no regional wall abnormality   Post CBP ARMANDO no systolic anterior motion or paravalvular leak.  - Monitor hemodynamic status.   - Goal MAP >65, SBP <140  - ASA 81 mg started  - Off pressor support  - Monitor hemodynamic status    FEN/GI  - Regular diet   - PPI for bowel prophylaxis  - Bowel regimen: MiraLAX, senna  - ICU electrolyte replacement protocol    #Rectal prolapse  -Patient has history of known rectal prolapse. Only bothersome when prolapsed. He is able to push it back in.    LIANNE/   - BL creat appears to be ~ 0.83. Cr  0.73.  - Urine output  1.8L   - Strict I/O, daily weights  - Avoid/limit nephrotoxins as able  - Replete lytes PRN per protocol    HEME/ID  #Stress induced leukocytosis   - Afebrile, improving leukocytosis  - No s/sx infection  - No indication for antibiotics.    # Acute blood loss anemia  - Hgb goal > 7.0  - Hemoglobin stable at 13  - No thrombocytopenia    ENDO  # Stress hyperglycemia  - Preop A1c 5.1   - No management  indication  - Goal BG <180 for optimal healing   - Continue POCT     MSK/Skin  # Surgical incision  - No acute concerns  - Continue diligent skin care to prevent further breakdown  - PT/OT    Prophylaxis:    - VTE: SCD's, heparin 5000u sq  - Bowel regimen: PPI, MiraLAX, senna    Lines/ tubes/ drains:  - RIJ CVC - remove  - CTs x2    Disposition:  - Transfer to the floor    Discussed plan today with patient and their family member. All questions were answered. They are in agreement with plan.     Patient seen, findings and plan discussed with CVICU staff and cardiothoracic surgeons.    Total Critical Care time spent by me, excluding procedures, was 60 minutes.    Ming Loyola MD  Urology PGY-1 on CVICU        ====================================    TODAY'S PROGRESS  SUBJECTIVE  Pain continues to improved. Denies worsening chest pain or emesis. Moving from bed to chair and ambulating. Some nausea after ambulating but feeling better now.    OBJECTIVE  1. VITAL SIGNS  Temp:  [97.7  F (36.5  C)-99.5  F (37.5  C)] 99.5  F (37.5  C)  Pulse:  [76-99] 89  Resp:  [14-16] 16  BP: (104-127)/(60-83) 119/74  MAP:  [67 mmHg-77 mmHg] 76 mmHg  Arterial Line BP: ()/(53-62) 106/60  SpO2:  [91 %-100 %] 94 %  Resp: 16    2. INTAKE/ OUTPUT  I/O last 3 completed shifts:  In: 2304.75 [P.O.:720; I.V.:584.75; IV Piggyback:1000]  Out: 2172 [Urine:1802; Chest Tube:370]    3. PHYSICAL EXAMINATION    Gen: No acute distress  HEENT: Normocephalic, atraumatic and nonicteric.   CV: RRR on monitor, S1S2, all extremities well perfused   Respiratory: CTAB, Normal respiratory effort on RA, equal chest rise b/l. Chest tube to suction, serosang output.   GI: Abdomen soft and non-tender to  palpation. Non-distended   : Voiding with Rojas   MSK: Moves all extremities well with normal appearing strength.   Neuro: Awake. Alert to person, place, and time.     Skin:  Incisional dressing c/d/i   Psych: mood and affect normal.       4.  "INVESTIGATIONS  Arterial Blood Gases   Recent Labs   Lab 05/01/25 0402 04/30/25 2003 04/30/25  1310 04/30/25  1225   PH 7.38 7.36 7.38 7.42   PCO2 44 45 44 38   PO2 102 103 83 285*   HCO3 26 26 26 25     Complete Blood Count   Recent Labs   Lab 05/02/25 0452 05/01/25 0357 04/30/25 2004 04/30/25  1310   WBC 10.7 10.6 10.1 11.6*   HGB 12.5* 12.5* 13.3 12.7*    168 173 170     Basic Metabolic Panel  Recent Labs   Lab 05/02/25 0452 05/01/25  0842 05/01/25  0552 05/01/25 0403 05/01/25 0357 04/30/25 2004 04/30/25  1614 04/30/25  1310     --   --   --  136 137  --  141   POTASSIUM 4.2  --   --   --  4.6 4.6  --  4.2   CHLORIDE 102  --   --   --  102 103  --  106   CO2 26  --   --   --  24 23  --  23   BUN 9.6  --   --   --  18.4 18.7  --  16.4   CR 0.65*  --   --   --  0.73 0.77  --  0.89   * 123* 127* 136* 135* 160*   < > 155*    < > = values in this interval not displayed.     Liver Function Tests  Recent Labs   Lab 05/02/25 0452 05/01/25 0357 04/30/25 2004 04/30/25 1310 04/30/25  1151   AST 50* 69* 81* 74*  --    ALT 21 22 25 23  --    ALKPHOS 45 47 51 45  --    BILITOTAL 0.7 1.1 1.6* 1.2  --    ALBUMIN 3.9 3.9 4.2 3.8  --    INR  --   --   --  1.33* 1.33*     Pancreatic Enzymes  No lab results found in last 7 days.  Coagulation Profile  Recent Labs   Lab 04/30/25 1310 04/30/25  1151   INR 1.33* 1.33*   PTT 27 21*     Lactate  Invalid input(s): \"LACTATE\"    5. RADIOLOGY  Recent Results (from the past 24 hours)   XR Chest Port 1 View    Narrative    Exam: XR CHEST PORT 1 VIEW, 5/1/2025 5:27 PM    Comparison: Radiograph same day, 4/30/2025    History: Chest pain    Findings:  Right IJ [place]-Andres catheter has been removed with the sheath remaining  in place. Stable right-sided chest tubes. Cardiomediastinal silhouette  is stable. Cardiac valve prosthesis. Low lung volumes with slightly  increased streaky perihilar and bibasilar opacities. No substantial  pleural effusion, though the left " costophrenic angle is not fully  included in the field-of-view. No appreciable pneumothorax. No acute  osseous abnormality. Visualized upper abdomen is unremarkable.      Impression    Impression:   1. Right IJ [place]-Andres catheter has been removed. Right chest tubes are  stable. No appreciable pneumothorax.  2. Low lung volumes with slightly increased streaky bibasilar  opacities, likely atelectasis and/or edema.     I have personally reviewed the examination and initial interpretation  and I agree with the findings.    COLE GRIGSBY MD         SYSTEM ID:  [id number]   XR Chest Port 1 View    Impression    RESIDENT PRELIMINARY INTERPRETATION  IMPRESSION:  1. Stable support devices.  2. Essentially unchanged bibasilar opacities, may represent  atelectasis/edema.

## 2025-05-02 NOTE — PROGRESS NOTES
Pain Service Progress Note  [facility]  Date: 05/02/2025       Patient Name: Johnie Buenrostro  MRN: [id number]  Age: 45 year old  Sex: male      Assessment:  Johnie Buenrostro is a 45 year old male who has PMH of ADHD and mitral valve regurgitation and is now s/p minimally invasive mitral valve repair by Dr. Maxwell on 4/30/2025.           Date of Catheter Placement: 4/30/25     Plan/Recommendations:  1. Regional Anesthesia/Analgesia  -Continuous Catheter Type/Site: right paravertebral (PV) T6-7  Infusate: ropivacaine 0.2%     Programmed Intermittent Bolus (PIB) at 12 mL Q60 min via each catheter, total infusion rate of 12 mL/hr  (this was increased during morning rounds on 5/1/25)-helpful.     -Johnie reports pain is managed. Oxycodone 10mg is helpful.      Plan to maintain catheter approximately of 7 days     2. Anticoagulation  -Please contact Inpatient Pain Service before ordering or making any anticoagulation changes  heparin ANTICOAGULANT injection 5,000 Units    5,000 Units, SC, Q8H         3. Multimodal Analgesia  -opioid naive at baseline  - per primary service: oxycodone 10mg and IV Dilaudid 0.4 mg     Pain Service will continue to follow.     Discussed with attending anesthesiologist    Emy Garza PA-C  05/02/2025     Overnight Events: No major acute event.    Tubes/Drains: Yes  Chest tubes    Subjective: Feels pain is manageable.      Symptoms of LAST: No    Pain Location:  Right chest/right shoulder    Pain Intensity:    Pain at Rest: 3/10   Pain with Activity: 4/10  Comfort Goal: 6/10     Satisfied with your level of pain control: Yes    Diet: Advance Diet as Tolerated: Regular Diet Adult    Relevant Labs:  Recent Labs   Lab Test 05/02/25  0452 04/30/25 2004 04/30/25  1310   PROTIME  --   --  16.4*   INR  --   --  1.33*      < > 170   PTT  --   --  27   BUN 9.6   < > 16.4    < > = values in this interval not displayed.       Physical Exam:  Vitals: /72   Pulse 93   " Temp 98.4  F (36.9  C) (Oral)   Resp 18   Ht 1.803 m (5' 11\")   Wt 89 kg (196 lb 3.4 oz)   SpO2 96%   BMI 27.37 kg/m      Physical Exam:   Orientation:  Alert, oriented, and in no acute distress: Yes  Sedation: No    Motor Examination:  5/5 Strength in lower extremities: Yes      Catheter Site:   Catheter entry site is clean/dry/intact: Yes    Tender: No      Relevant Medications:  Current Pain Medications:  Medications related to Pain Management (From now, onward)      Start     Dose/Rate Route Frequency Ordered Stop    05/03/25 0000  bisacodyl (DULCOLAX) suppository 10 mg         10 mg Rectal DAILY PRN 04/30/25 1256      05/02/25 2200  gabapentin (NEURONTIN) capsule 400 mg         400 mg Oral AT BEDTIME 05/01/25 2230 05/02/25 0800  methocarbamol (ROBAXIN) tablet 1,000 mg         1,000 mg Oral 4 TIMES DAILY 05/01/25 2223 05/02/25 0800  gabapentin (NEURONTIN) capsule 200 mg         200 mg Oral 2 TIMES DAILY. 05/01/25 2225 05/02/25 0000  magnesium hydroxide (MILK OF MAGNESIA) suspension 30 mL         30 mL Oral DAILY PRN 04/30/25 1256      05/01/25 2000  senna-docusate (SENOKOT-S/PERICOLACE) 8.6-50 MG per tablet 2 tablet         2 tablet Oral 2 TIMES DAILY 05/01/25 1007      05/01/25 1300  ROPivacaine 0.2% in sodium chloride 0.9% PERINEURAL infusion          Perineural Continuous Nerve Block 05/01/25 1246      05/01/25 1009  hydrOXYzine HCl (ATARAX) tablet 25 mg        Placed in \"Or\" Linked Group    25 mg Oral EVERY 6 HOURS PRN 05/01/25 1016      05/01/25 1009  hydrOXYzine HCl (ATARAX) tablet 50 mg        Placed in \"Or\" Linked Group    50 mg Oral EVERY 6 HOURS PRN 05/01/25 1016      05/01/25 0824  oxyCODONE (ROXICODONE) tablet 5 mg        Placed in \"Or\" Linked Group    5 mg Oral EVERY 3 HOURS PRN 05/01/25 0824      05/01/25 0824  oxyCODONE IR (ROXICODONE) tablet 10 mg        Placed in \"Or\" Linked Group    10 mg Oral EVERY 3 HOURS PRN 05/01/25 0824      05/01/25 0800  polyethylene glycol " "(MIRALAX) Packet 17 g         17 g Oral DAILY 04/30/25 1256      05/01/25 0800  aspirin (ASA) chewable tablet 81 mg         81 mg Oral or NG Tube DAILY 04/30/25 1256      04/30/25 1300  acetaminophen (TYLENOL) tablet 975 mg         975 mg Oral EVERY 8 HOURS 04/30/25 1256      04/30/25 1256  HYDROmorphone (DILAUDID) injection 0.2 mg        Placed in \"Or\" Linked Group    0.2 mg Intravenous EVERY 2 HOURS PRN 04/30/25 1256      04/30/25 1256  HYDROmorphone (DILAUDID) injection 0.4 mg        Placed in \"Or\" Linked Group    0.4 mg Intravenous EVERY 2 HOURS PRN 04/30/25 1256      04/30/25 1256  lidocaine 1 % 0.1-1 mL         0.1-1 mL Other EVERY 1 HOUR PRN 04/30/25 1256      04/30/25 1256  lidocaine (LMX4) cream          Topical EVERY 1 HOUR PRN 04/30/25 1256              Primary Service Contacted with Recommendations? Yes            Acute Inpatient Pain Service [facility]  Hours of pain coverage 24/7   Please Page via [employer]oc[employer]  - Link to Vocera Here - Search Pain  Page via [employer]- Please Page the Pain Team Via [employer]: \"PAIN MANAGEMENT ACUTE INPATIENT/ [facility]\"            "

## 2025-05-02 NOTE — PROGRESS NOTES
D/continues to take a while to void and feels constipated. Days said it took him less time to void each time he tried, days also gave some Miralax to him  I/suggested soup, juice and fluids with supper to fill bladder better to help him pee easier and also hydration for bowels. Ropivocaine continues with intermittent boluses PW capped  A/monitor these thing  D/He was able to have BM on toilet and was able to void also without effort  I/reminded him to drink some fluids in next 24 hours to keep enough urine in the bladder so he can pee easily.  A/oxycodone and scheduled Robaxin helped with pain  P/monitor for changes, keep him and team updated

## 2025-05-02 NOTE — PROGRESS NOTES
Cardiovascular Surgery Progress Note  05/03/2025         Assessment and Plan:     Johnie Buenrostro is a 45 year old male with a PMH of ADHD, severe mitral valve regurgitation. Patient is now s/p right mini thoracotomy, mitral repair on 4/30/2025 with Dr. Maxwell. ICU course unremarkable, transferred to [facility] [place] on [place] day 2.    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 Later around 1500 patient went into afib with RVR. HD stable but symptomatic with SOB, palpitations, lightheadedness. No chest pain. Give IV metoprolol x1 and start IV amiodarone  Postop echo ordered for 5/4    Chest tubes and TPW: removed 5/3 without incident    Pulmonary:  Extubated POD 0. Now saturating well on RA  Supplemental O2 PRN to keep sats > 92%.   Continue aggressive pulm hygiene, IS, activity and deep breathing    Neurology / MSK:  Acute post-operative pain  ADHD  Anxiety  Pain well controlled with current regimen  Scheduled: tylenol, gabapentin, robaxin  PRN: dilaudid, atarax, oxycodone  HOLD PTA adderall XR  Acute pain services following, managing peripheral nerve catheters while chest tubes in place, appreciate assistance.  Increased anxiety this morning, prn atarax given     / Renal / Fluid / Electrolytes:  BL creat ~ 0.8, most recent creatinine 0.65; adequate UOP.   FLUID STATUS: Pre-op weight 191 lbs, weight today 185; I/O past 24 hours: -2 L  Diuresis: IV lasix 20 mg x1 today, assess response  Replete lytes per protocol  Strict I/O, daily weights  Avoid/limit nephrotoxins as able    GI / Nutrition:   Risk for protein  "calorie malnutrition  Tolerating regular diet  Continue bowel regimen, last BM 5/3    Endocrine:  Stress induced hyperglycemia, resolved  Hgb A1c 5.1%  Initially managed on insulin drip postop, transitioned to sliding scale now discontinued due to no needs  Goal BG <180 for optimal healing    Infectious Disease:  Stress induced leukocytosis, resolved  WBC 9.6, remains afebrile, no signs or symptoms of infection  Completed perioperative antibiotics  Continue to monitor fever curve, CBC    Hematology:   Acute blood loss anemia, resolved  Hgb 13.9; Plt WNL, no signs or symptoms of active bleeding  CBC daily    Anticoagulation:   ASA 81 mg daily    MSK/Skin:  Sternotomy  Surgical incision  Sternal precautions  Incisional cares per protocol    Prophylaxis:   Stress ulcer prophylaxis: Pantoprazole 40 mg daily  DVT prophylaxis: Subcutaneous heparin, SCD    Medically Ready for Discharge: Anticipated in 2-4 Days       Clinically Significant Risk Factors                                # Overweight: Estimated body mass index is 25.86 kg/m  as calculated from the following:    Height as of this encounter: 1.803 m (5' 11\").    Weight as of this encounter: 84.1 kg (185 lb 6.4 oz)., PRESENT ON ADMISSION              Disposition:   Transferred to  on 5/2  Therapies recommending discharge to home with CR pending stable heart rhythm, postop echo    Dr. Maxwell has been informed of changes through written communication.      Chelsi Jaquez PA-C  Cardiothoracic Surgery  Available on [employer]        Interval History:     No overnight events. Up from ICU yesterday afternoon.  Pain somewhat uncontrolled with PO, IV agents as well as paravertebral catheters.  Fatigued this morning due to poor sleep and later with afib.  Breathing feels shallow 2/2 pain and possible component of anxiety. Continue deep breathing/IS.   Activity limited today by fatigue/afib.         Physical Exam:     Gen: A&Ox4, NAD, resting in bed, fatigued  Neuro: Intact " "with no focal deficits   CV: RRR on morning exam, normal S1 S2, no murmurs, rubs or gallops  Pulm: CTA, no wheezing or rhonchi, normal breathing on RA  Abd: nondistended, normal BS, soft, nontender  Ext: no peripheral edema  Incision: right chest incisions clean, dry, intact, no erythema. Right groin incision c/d/i, no erythema.  Tubes/drain sites: dressing clean and dry, serosanguinous output, no air leak. 24 hr output 230 mL. Removed without incident, occlusive dressing placed.           Data:    BP 91/72   Pulse 98   Temp 100.1  F (37.8  C) (Oral)   Resp 12   Ht 1.803 m (5' 11\")   Wt 84.1 kg (185 lb 6.4 oz)   SpO2 99%   BMI 25.86 kg/m       I/O last 3 completed shifts:  In: 1500 [P.O.:1500]  Out: 1080 [Urine:700; Chest Tube:380]    Imaging:  reviewed recent imaging, no acute concerns    Recent Results (from the past 24 hours)   XR Chest Port 1 View    Narrative    Exam: XR CHEST PORT 1 VIEW, 5/3/2025 6:47 AM    Indication: new chest pain    Comparison: Chest x-ray 5/2/2025    Findings:   Portable AP radiograph the chest. Stable position of multiple chest  tubes in the right hemithorax. Postoperative changes of mitral valve  placement. Trachea is midline. The cardiac mediastinal silhouette and  pulmonary vasculature are stable. Streaky perihilar and bibasilar  opacities, similar to 5/2/2025. Normal lung volumes are symmetric and  diaphragms. No acute focal airspace opacity. No pneumothorax or  pleural effusion. Upper abdomen is unremarkable. No acute osseous or  soft tissue abnormality.      Impression    Impression:   1. Stable chest tubes within the right hemithorax.  2. Unchanged perihilar and bibasilar streaky opacities, favors  atelectasis/edema.     I have personally reviewed the examination and initial interpretation  and I agree with the findings.    ARBEN SWANSON MD         SYSTEM ID:  [id number]   XR Chest Port 1 View    Narrative    Exam: Chest 1 view portable 5/3/2025 11:07 AM     History:  chest " tube removal    Comparison: same day at 06 38    Findings: Cardiac silhouette is enlarged. Mitral valve replacement.  Azygos fissure variant. Right chest tube is been removed. No  pneumothorax. Paravertebral anesthesia catheter on the right.  Bibasilar atelectasis. Interstitial opacities bilaterally.      Impression    IMPRESSION: No evidence of pneumothorax post right chest tube removal.    ARBEN SWANSON MD         SYSTEM ID:  [id number]          Labs:  Recent Labs   Lab 05/03/25  0647 05/02/25  0452 05/01/25  0842 05/01/25  0403 05/01/25  0357 04/30/25  1614 04/30/25  1310 04/30/25  1157 04/30/25  1151   WBC 9.6 10.7  --   --  10.6   < > 11.6*  --   --    HGB 13.9 12.5*  --   --  12.5*   < > 12.7*   < >  --    MCV 92 91  --   --  89   < > 89  --   --     155  --   --  168   < > 170  --  194   INR  --   --   --   --   --   --  1.33*  --  1.33*    137  --   --  136   < > 141   < >  --    POTASSIUM 4.0  4.0 4.2  --   --  4.6   < > 4.2   < >  --    CHLORIDE 101 102  --   --  102   < > 106  --   --    CO2 23 26  --   --  24   < > 23  --   --    BUN 6.8 9.6  --   --  18.4   < > 16.4  --   --    CR 0.65* 0.65*  --   --  0.73   < > 0.89  --   --    ANIONGAP 12 9  --   --  10   < > 12  --   --    JOSE MARIA 8.9 8.7*  --   --  8.2*   < > 8.6*  --   --    * 114* 123*   < > 135*   < > 155*   < >  --    ALBUMIN 3.8 3.9  --   --  3.9   < > 3.8  --   --    PROTTOTAL 6.8 6.4  --   --  6.0*   < > 5.6*  --   --    BILITOTAL 0.8 0.7  --   --  1.1   < > 1.2  --   --    ALKPHOS 49 45  --   --  47   < > 45  --   --    ALT 22 21  --   --  22   < > 23  --   --    AST 35 50*  --   --  69*   < > 74*  --   --     < > = values in this interval not displayed.      GLUCOSE:   Recent Labs   Lab 05/03/25  0647 05/02/25  0452 05/01/25  0842 05/01/25  0552 05/01/25  0403 05/01/25  0357   * 114* 123* 127* 136* 135*

## 2025-05-02 NOTE — PROGRESS NOTES
Major Shift Events:    Neuro: A/O x4, follows commands, BEAUCHAMP, PERRLA   CV: HR 80-90s. MAP >65. SR. Afebrile  Resp: RA, LS clear  GI: reg diet,   : voiding spon, good UOP  IV/Drips:   PIV x1  Plan: transfer to the floor   For vital signs and complete assessments, please see documentation flowsheets.

## 2025-05-02 NOTE — PLAN OF CARE
Goal Outcome Evaluation:      Plan of Care Reviewed With: patient    Overall Patient Progress: improvingOverall Patient Progress: improving    Outcome Evaluation: Patient remained hemodynamically stable overnight. Pain was much better controlled and patient was able to get some sleep.    Neuro: A/O x4, follows commands, standby assist, incisional pain that radiates into his right shoulder, scheduled Tylenol/Gabapentin/Robaxin and prn Oxycodone/Dilaudid, slight numbness in his RUE (team aware), PERRLA, afebrile    CV: NSR 80-90's, no ectopy seen, MAP > 65, SBP < 140    Pulm: RA, LS clear with diminished bases     GI: Regular diet, hypoactive bowel sounds, passing gas, last BM PTA    : Voids spontaneously without difficulty    Skin: R chest incisions x2, R CT sites x2, R groin incision    Drains: 2 R Pleural CT's (output WDL)    Drips: n/a    Lines: R internal jugular MAC (capped), L PIV    Labs: WDL

## 2025-05-03 ENCOUNTER — APPOINTMENT (OUTPATIENT)
Dept: GENERAL RADIOLOGY | Facility: CLINIC | Age: 45
End: 2025-05-03
Attending: STUDENT IN AN ORGANIZED HEALTH CARE EDUCATION/TRAINING PROGRAM
Payer: COMMERCIAL

## 2025-05-03 ENCOUNTER — APPOINTMENT (OUTPATIENT)
Dept: GENERAL RADIOLOGY | Facility: CLINIC | Age: 45
End: 2025-05-03
Payer: COMMERCIAL

## 2025-05-03 LAB
ALBUMIN SERPL BCG-MCNC: 3.8 G/DL (ref 3.5–5.2)
ALP SERPL-CCNC: 49 U/L (ref 40–150)
ALT SERPL W P-5'-P-CCNC: 22 U/L (ref 0–70)
ANION GAP SERPL CALCULATED.3IONS-SCNC: 12 MMOL/L (ref 7–15)
AST SERPL W P-5'-P-CCNC: 35 U/L (ref 0–45)
BILIRUB SERPL-MCNC: 0.8 MG/DL
BUN SERPL-MCNC: 6.8 MG/DL (ref 6–20)
CALCIUM SERPL-MCNC: 8.9 MG/DL (ref 8.8–10.4)
CHLORIDE SERPL-SCNC: 101 MMOL/L (ref 98–107)
CREAT SERPL-MCNC: 0.65 MG/DL (ref 0.67–1.17)
EGFRCR SERPLBLD CKD-EPI 2021: >90 ML/MIN/1.73M2
ERYTHROCYTE [DISTWIDTH] IN BLOOD BY AUTOMATED COUNT: 13 % (ref 10–15)
GLUCOSE SERPL-MCNC: 113 MG/DL (ref 70–99)
HCO3 SERPL-SCNC: 23 MMOL/L (ref 22–29)
HCT VFR BLD AUTO: 41.1 % (ref 40–53)
HGB BLD-MCNC: 13.9 G/DL (ref 13.3–17.7)
MAGNESIUM SERPL-MCNC: 1.9 MG/DL (ref 1.7–2.3)
MCH RBC QN AUTO: 31 PG (ref 26.5–33)
MCHC RBC AUTO-ENTMCNC: 33.8 G/DL (ref 31.5–36.5)
MCV RBC AUTO: 92 FL (ref 78–100)
PHOSPHATE SERPL-MCNC: 2.2 MG/DL (ref 2.5–4.5)
PLATELET # BLD AUTO: 165 10E3/UL (ref 150–450)
POTASSIUM SERPL-SCNC: 4 MMOL/L (ref 3.4–5.3)
POTASSIUM SERPL-SCNC: 4 MMOL/L (ref 3.4–5.3)
PROT SERPL-MCNC: 6.8 G/DL (ref 6.4–8.3)
RBC # BLD AUTO: 4.48 10E6/UL (ref 4.4–5.9)
SODIUM SERPL-SCNC: 136 MMOL/L (ref 135–145)
WBC # BLD AUTO: 9.6 10E3/UL (ref 4–11)

## 2025-05-03 PROCEDURE — 71045 X-RAY EXAM CHEST 1 VIEW: CPT

## 2025-05-03 PROCEDURE — 250N000009 HC RX 250: Performed by: STUDENT IN AN ORGANIZED HEALTH CARE EDUCATION/TRAINING PROGRAM

## 2025-05-03 PROCEDURE — 250N000013 HC RX MED GY IP 250 OP 250 PS 637: Performed by: STUDENT IN AN ORGANIZED HEALTH CARE EDUCATION/TRAINING PROGRAM

## 2025-05-03 PROCEDURE — 250N000011 HC RX IP 250 OP 636: Mod: JZ | Performed by: STUDENT IN AN ORGANIZED HEALTH CARE EDUCATION/TRAINING PROGRAM

## 2025-05-03 PROCEDURE — 71045 X-RAY EXAM CHEST 1 VIEW: CPT | Mod: 26 | Performed by: RADIOLOGY

## 2025-05-03 PROCEDURE — 36415 COLL VENOUS BLD VENIPUNCTURE: CPT

## 2025-05-03 PROCEDURE — 83735 ASSAY OF MAGNESIUM: CPT | Performed by: SURGERY

## 2025-05-03 PROCEDURE — 271N000002 HC RX 271: Performed by: STUDENT IN AN ORGANIZED HEALTH CARE EDUCATION/TRAINING PROGRAM

## 2025-05-03 PROCEDURE — 250N000011 HC RX IP 250 OP 636: Performed by: STUDENT IN AN ORGANIZED HEALTH CARE EDUCATION/TRAINING PROGRAM

## 2025-05-03 PROCEDURE — 93010 ELECTROCARDIOGRAM REPORT: CPT | Performed by: INTERNAL MEDICINE

## 2025-05-03 PROCEDURE — 999N000128 HC STATISTIC PERIPHERAL IV START W/O US GUIDANCE

## 2025-05-03 PROCEDURE — 84132 ASSAY OF SERUM POTASSIUM: CPT | Performed by: SURGERY

## 2025-05-03 PROCEDURE — 250N000013 HC RX MED GY IP 250 OP 250 PS 637: Performed by: PHYSICIAN ASSISTANT

## 2025-05-03 PROCEDURE — 82040 ASSAY OF SERUM ALBUMIN: CPT

## 2025-05-03 PROCEDURE — 71045 X-RAY EXAM CHEST 1 VIEW: CPT | Mod: 77

## 2025-05-03 PROCEDURE — 99232 SBSQ HOSP IP/OBS MODERATE 35: CPT | Mod: GC

## 2025-05-03 PROCEDURE — 120N000005 HC R&B MS OVERFLOW UMMC

## 2025-05-03 PROCEDURE — 250N000013 HC RX MED GY IP 250 OP 250 PS 637

## 2025-05-03 PROCEDURE — 250N000009 HC RX 250

## 2025-05-03 PROCEDURE — 85027 COMPLETE CBC AUTOMATED: CPT

## 2025-05-03 PROCEDURE — 250N000011 HC RX IP 250 OP 636: Mod: JZ | Performed by: ANESTHESIOLOGY

## 2025-05-03 PROCEDURE — 84100 ASSAY OF PHOSPHORUS: CPT | Performed by: SURGERY

## 2025-05-03 PROCEDURE — 250N000011 HC RX IP 250 OP 636

## 2025-05-03 PROCEDURE — 271N000002 HC RX 271: Performed by: ANESTHESIOLOGY

## 2025-05-03 PROCEDURE — 93005 ELECTROCARDIOGRAM TRACING: CPT

## 2025-05-03 RX ORDER — METOPROLOL TARTRATE 1 MG/ML
5 INJECTION, SOLUTION INTRAVENOUS EVERY 5 MIN PRN
Status: DISCONTINUED | OUTPATIENT
Start: 2025-05-03 | End: 2025-05-04

## 2025-05-03 RX ORDER — METOPROLOL TARTRATE 1 MG/ML
5 INJECTION, SOLUTION INTRAVENOUS ONCE
Status: COMPLETED | OUTPATIENT
Start: 2025-05-03 | End: 2025-05-03

## 2025-05-03 RX ORDER — FUROSEMIDE 10 MG/ML
20 INJECTION INTRAMUSCULAR; INTRAVENOUS ONCE
Status: COMPLETED | OUTPATIENT
Start: 2025-05-03 | End: 2025-05-03

## 2025-05-03 RX ORDER — METHOCARBAMOL 500 MG/1
500 TABLET, FILM COATED ORAL ONCE
Status: COMPLETED | OUTPATIENT
Start: 2025-05-03 | End: 2025-05-03

## 2025-05-03 RX ORDER — METOPROLOL TARTRATE 1 MG/ML
2.5 INJECTION, SOLUTION INTRAVENOUS ONCE
Status: COMPLETED | OUTPATIENT
Start: 2025-05-03 | End: 2025-05-03

## 2025-05-03 RX ORDER — MAGNESIUM OXIDE 400 MG/1
400 TABLET ORAL EVERY 4 HOURS
Status: COMPLETED | OUTPATIENT
Start: 2025-05-03 | End: 2025-05-03

## 2025-05-03 RX ADMIN — GABAPENTIN 200 MG: 100 CAPSULE ORAL at 15:37

## 2025-05-03 RX ADMIN — HEPARIN SODIUM 5000 UNITS: 5000 INJECTION, SOLUTION INTRAVENOUS; SUBCUTANEOUS at 11:40

## 2025-05-03 RX ADMIN — ACETAMINOPHEN 975 MG: 325 TABLET, FILM COATED ORAL at 11:42

## 2025-05-03 RX ADMIN — HYDROXYZINE HYDROCHLORIDE 25 MG: 25 TABLET ORAL at 21:18

## 2025-05-03 RX ADMIN — SENNOSIDES AND DOCUSATE SODIUM 2 TABLET: 50; 8.6 TABLET ORAL at 21:18

## 2025-05-03 RX ADMIN — AMIODARONE HYDROCHLORIDE 1 MG/MIN: 1.8 INJECTION, SOLUTION INTRAVENOUS at 15:46

## 2025-05-03 RX ADMIN — GABAPENTIN 400 MG: 300 CAPSULE ORAL at 21:17

## 2025-05-03 RX ADMIN — METHOCARBAMOL 1000 MG: 500 TABLET ORAL at 21:18

## 2025-05-03 RX ADMIN — MAGNESIUM OXIDE TAB 400 MG (241.3 MG ELEMENTAL MG) 400 MG: 400 (241.3 MG) TAB at 07:46

## 2025-05-03 RX ADMIN — METHOCARBAMOL 1000 MG: 500 TABLET ORAL at 07:46

## 2025-05-03 RX ADMIN — METOPROLOL TARTRATE 5 MG: 5 INJECTION INTRAVENOUS at 19:55

## 2025-05-03 RX ADMIN — AMIODARONE HYDROCHLORIDE 150 MG: 1.5 INJECTION, SOLUTION INTRAVENOUS at 21:11

## 2025-05-03 RX ADMIN — METHOCARBAMOL 1000 MG: 500 TABLET ORAL at 15:21

## 2025-05-03 RX ADMIN — AMIODARONE HYDROCHLORIDE 150 MG: 1.5 INJECTION, SOLUTION INTRAVENOUS at 15:27

## 2025-05-03 RX ADMIN — OXYCODONE HYDROCHLORIDE 10 MG: 10 TABLET ORAL at 07:46

## 2025-05-03 RX ADMIN — HYDROXYZINE HYDROCHLORIDE 25 MG: 25 TABLET ORAL at 07:46

## 2025-05-03 RX ADMIN — Medication 10 MG: at 21:17

## 2025-05-03 RX ADMIN — GABAPENTIN 200 MG: 100 CAPSULE ORAL at 07:51

## 2025-05-03 RX ADMIN — METHOCARBAMOL 500 MG: 500 TABLET ORAL at 06:47

## 2025-05-03 RX ADMIN — POTASSIUM & SODIUM PHOSPHATES POWDER PACK 280-160-250 MG 1 PACKET: 280-160-250 PACK at 07:46

## 2025-05-03 RX ADMIN — Medication: at 11:37

## 2025-05-03 RX ADMIN — SENNOSIDES AND DOCUSATE SODIUM 2 TABLET: 50; 8.6 TABLET ORAL at 07:46

## 2025-05-03 RX ADMIN — POTASSIUM & SODIUM PHOSPHATES POWDER PACK 280-160-250 MG 1 PACKET: 280-160-250 PACK at 15:21

## 2025-05-03 RX ADMIN — Medication 500 MG: at 02:37

## 2025-05-03 RX ADMIN — HEPARIN SODIUM 5000 UNITS: 5000 INJECTION, SOLUTION INTRAVENOUS; SUBCUTANEOUS at 04:18

## 2025-05-03 RX ADMIN — METHOCARBAMOL 1000 MG: 500 TABLET ORAL at 11:40

## 2025-05-03 RX ADMIN — ASPIRIN 81 MG CHEWABLE TABLET 81 MG: 81 TABLET CHEWABLE at 07:46

## 2025-05-03 RX ADMIN — MAGNESIUM OXIDE TAB 400 MG (241.3 MG ELEMENTAL MG) 400 MG: 400 (241.3 MG) TAB at 11:42

## 2025-05-03 RX ADMIN — OXYCODONE HYDROCHLORIDE 10 MG: 10 TABLET ORAL at 04:15

## 2025-05-03 RX ADMIN — POTASSIUM & SODIUM PHOSPHATES POWDER PACK 280-160-250 MG 1 PACKET: 280-160-250 PACK at 11:43

## 2025-05-03 RX ADMIN — METOPROLOL TARTRATE 5 MG: 5 INJECTION INTRAVENOUS at 13:00

## 2025-05-03 RX ADMIN — ACETAMINOPHEN 975 MG: 325 TABLET, FILM COATED ORAL at 04:15

## 2025-05-03 RX ADMIN — FUROSEMIDE 20 MG: 10 INJECTION, SOLUTION INTRAMUSCULAR; INTRAVENOUS at 11:42

## 2025-05-03 RX ADMIN — OXYCODONE HYDROCHLORIDE 10 MG: 10 TABLET ORAL at 11:40

## 2025-05-03 RX ADMIN — OXYCODONE HYDROCHLORIDE 10 MG: 10 TABLET ORAL at 01:00

## 2025-05-03 RX ADMIN — HYDROMORPHONE HYDROCHLORIDE 0.4 MG: 0.2 INJECTION, SOLUTION INTRAMUSCULAR; INTRAVENOUS; SUBCUTANEOUS at 02:45

## 2025-05-03 RX ADMIN — HEPARIN SODIUM 5000 UNITS: 5000 INJECTION, SOLUTION INTRAVENOUS; SUBCUTANEOUS at 21:18

## 2025-05-03 RX ADMIN — METOPROLOL TARTRATE 2.5 MG: 5 INJECTION INTRAVENOUS at 15:21

## 2025-05-03 RX ADMIN — ACETAMINOPHEN 975 MG: 325 TABLET, FILM COATED ORAL at 21:16

## 2025-05-03 RX ADMIN — HYDROMORPHONE HYDROCHLORIDE 0.4 MG: 0.2 INJECTION, SOLUTION INTRAMUSCULAR; INTRAVENOUS; SUBCUTANEOUS at 05:15

## 2025-05-03 RX ADMIN — AMIODARONE HYDROCHLORIDE 0.5 MG/MIN: 1.8 INJECTION, SOLUTION INTRAVENOUS at 21:41

## 2025-05-03 ASSESSMENT — ACTIVITIES OF DAILY LIVING (ADL)
ADLS_ACUITY_SCORE: 34
ADLS_ACUITY_SCORE: 34
ADLS_ACUITY_SCORE: 37
ADLS_ACUITY_SCORE: 37
ADLS_ACUITY_SCORE: 39
ADLS_ACUITY_SCORE: 34
ADLS_ACUITY_SCORE: 37
ADLS_ACUITY_SCORE: 37
ADLS_ACUITY_SCORE: 34
ADLS_ACUITY_SCORE: 34
ADLS_ACUITY_SCORE: 37
ADLS_ACUITY_SCORE: 37
ADLS_ACUITY_SCORE: 39
ADLS_ACUITY_SCORE: 37
ADLS_ACUITY_SCORE: 39
ADLS_ACUITY_SCORE: 39
ADLS_ACUITY_SCORE: 37
ADLS_ACUITY_SCORE: 39
ADLS_ACUITY_SCORE: 34
ADLS_ACUITY_SCORE: 37
ADLS_ACUITY_SCORE: 39

## 2025-05-03 NOTE — PLAN OF CARE
Neuro: A&Ox4. Family at bedside   Cardiac: .afib/aflutter rates 135    - pt went into afib / aflutter 12:55-1:10p. gave metoprolol Converted to SR .   - 3p went back into afib/aflutter, gave metoprolol and started amio drip. Converted to SR at 5:15p.  - 7:30p went back into afib/aflutter, bolus x1 amio + metoprolol. Converted to SR at 9:40p.   -1045p went back to aflutter rates 134, orders for metoprolol not met bc BP 90 /54  Respiratory: Sating 98 on RA.  GI/: Adequate urine output. BM X1  Diet/appetite: Tolerating regular diet. Minimal appetite   Activity:  IND/ SBA up to chair and bathroom  Pain: pt c/o 8/10 pain, prn oxycodone, atarax given   Skin: CT removed today. CDI  LDA's:  PIV - infusing amiodarone 0.5mg/min  Ropivacaine infusing 10ml/hr    Plan: -     Continue with POC. Notify primary team with changes.

## 2025-05-03 NOTE — PROVIDER NOTIFICATION
Provider Notified: Marcio Casas MD. Tali Hawley MD.   Notified via: Val  Notified @: [phone]    Leaking at epidural site but contained in tagaderm - serosang. Pt complains of pain in shoulder and back. Photo sent and MD assessed at bedside. Also noted that while receiving the bolus pt felt like his pain was increasing. Per MD, OK to monitor at this time. No new orders.

## 2025-05-03 NOTE — PROGRESS NOTES
"Pain Service Progress Note  [facility]  Date: 05/03/2025       Patient Name: Johnie Buenrostro  MRN: [id number]  Age: 45 year old  Sex: male      Assessment:  Johnie Buenrostro is a 45 year old male who has PMH of ADHD and mitral valve regurgitation and is now s/p minimally invasive mitral valve repair by Dr. Maxwell on 4/30/2025.     Date of Catheter Placement: 4/30/25     Plan/Recommendations:  1. Regional Anesthesia/Analgesia  -Continuous Catheter Type/Site: right paravertebral (PV) T6-7  Infusate: ropivacaine 0.2%     Programmed Intermittent Bolus (PIB) at 12 mL Q60 min changed to continuous rate at 10 mL today      Plan to maintain catheter approximately of 7 days     2. Anticoagulation  -Please contact Inpatient Pain Service before ordering or making any anticoagulation changes  heparin ANTICOAGULANT injection 5,000 Units    5,000 Units, SC, Q8H         3. Multimodal Analgesia  - opioid naive at baseline  - per primary service: oxycodone 10mg and IV Dilaudid 0.4 mg     Pain Service will continue to follow.     Discussed with attending anesthesiologist    Edgardo Be MD, CA3  05/03/2025     Overnight Events: No major acute event.    Tubes/Drains: Chest tubes removed 5/3    Subjective: Feels pain is manageable.      Symptoms of LAST: No    Pain Location:  Right chest/right shoulder    Pain Intensity:    Pain at Rest: 3/10   Pain with Activity: 4/10    Satisfied with your level of pain control: Yes    Diet: Advance Diet as Tolerated: Regular Diet Adult    Relevant Labs:  Recent Labs   Lab Test 05/02/25  0452 04/30/25 2004 04/30/25  1310   PROTIME  --   --  16.4*   INR  --   --  1.33*      < > 170   PTT  --   --  27   BUN 9.6   < > 16.4    < > = values in this interval not displayed.       Physical Exam:  Vitals: /72   Pulse (!) 160   Temp 37.8  C (100.1  F) (Oral)   Resp 12   Ht 1.803 m (5' 11\")   Wt 84.1 kg (185 lb 6.4 oz)   SpO2 99%   BMI 25.86 kg/m      Physical Exam: " "  Orientation:  Alert, oriented, and in no acute distress: Yes  Sedation: No    Motor Examination:  5/5 Strength in lower extremities: Yes      Catheter Site:   Catheter entry site is clean/dry/intact: Yes    Tender: No      Relevant Medications:  Current Pain Medications:  Medications related to Pain Management (From now, onward)      Start     Dose/Rate Route Frequency Ordered Stop    05/03/25 0000  bisacodyl (DULCOLAX) suppository 10 mg         10 mg Rectal DAILY PRN 04/30/25 1256      05/02/25 2200  gabapentin (NEURONTIN) capsule 400 mg         400 mg Oral AT BEDTIME 05/01/25 2230 05/02/25 0800  methocarbamol (ROBAXIN) tablet 1,000 mg         1,000 mg Oral 4 TIMES DAILY 05/01/25 2223 05/02/25 0800  gabapentin (NEURONTIN) capsule 200 mg         200 mg Oral 2 TIMES DAILY. 05/01/25 2225 05/02/25 0000  magnesium hydroxide (MILK OF MAGNESIA) suspension 30 mL         30 mL Oral DAILY PRN 04/30/25 1256      05/01/25 2000  senna-docusate (SENOKOT-S/PERICOLACE) 8.6-50 MG per tablet 2 tablet         2 tablet Oral 2 TIMES DAILY 05/01/25 1007      05/01/25 1300  ROPivacaine 0.2% in sodium chloride 0.9% PERINEURAL infusion          Perineural Continuous Nerve Block 05/01/25 1246      05/01/25 1009  hydrOXYzine HCl (ATARAX) tablet 25 mg        Placed in \"Or\" Linked Group    25 mg Oral EVERY 6 HOURS PRN 05/01/25 1016      05/01/25 1009  hydrOXYzine HCl (ATARAX) tablet 50 mg        Placed in \"Or\" Linked Group    50 mg Oral EVERY 6 HOURS PRN 05/01/25 1016      05/01/25 0824  oxyCODONE (ROXICODONE) tablet 5 mg        Placed in \"Or\" Linked Group    5 mg Oral EVERY 3 HOURS PRN 05/01/25 0824      05/01/25 0824  oxyCODONE IR (ROXICODONE) tablet 10 mg        Placed in \"Or\" Linked Group    10 mg Oral EVERY 3 HOURS PRN 05/01/25 0824      05/01/25 0800  polyethylene glycol (MIRALAX) Packet 17 g         17 g Oral DAILY 04/30/25 1256      05/01/25 0800  aspirin (ASA) chewable tablet 81 mg         81 mg Oral or NG Tube DAILY " "04/30/25 1256      04/30/25 1300  acetaminophen (TYLENOL) tablet 975 mg         975 mg Oral EVERY 8 HOURS 04/30/25 1256      04/30/25 1256  HYDROmorphone (DILAUDID) injection 0.2 mg        Placed in \"Or\" Linked Group    0.2 mg Intravenous EVERY 2 HOURS PRN 04/30/25 1256      04/30/25 1256  HYDROmorphone (DILAUDID) injection 0.4 mg        Placed in \"Or\" Linked Group    0.4 mg Intravenous EVERY 2 HOURS PRN 04/30/25 1256      04/30/25 1256  lidocaine 1 % 0.1-1 mL         0.1-1 mL Other EVERY 1 HOUR PRN 04/30/25 1256      04/30/25 1256  lidocaine (LMX4) cream          Topical EVERY 1 HOUR PRN 04/30/25 1256              Primary Service Contacted with Recommendations? Yes            Acute Inpatient Pain Service [facility]  Hours of pain coverage 24/7   Please Page via [employer]  - Link to [employer] Here - Search Pain  Page via [employer]- Please Page the Pain Team Via [employer]: \"PAIN MANAGEMENT ACUTE INPATIENT/ [facility]\"            "

## 2025-05-03 NOTE — PLAN OF CARE
Team: CVTS  Hours of care: [id number].     Shift events: see provider notification notes.     Neuro: A&Ox4.   Cardiac: Afebrile, VSS.   Respiratory: RA  GI/: Voiding spontaneously. X1 small BM.  Diet/appetite: Tolerating regular diet. Denies nausea.  Activity: Up with standby assist  Pain: Back pain, shoulder pain, episodes of chest/jaw pain, PRN dilaudid and oxy given with some relief.   Skin: epidural leaking, team aware, otherwise no new deficits.   Lines: L Piv - SL. Ripivocaine to epidural 12mL boluses every 60 minutes.   Drains: x2 CT to x1 atrium.     Provider Communication: see notification notes.     Silvestre BENITO RN. 6C cardiology.     Goal Outcome Evaluation:      Plan of Care Reviewed With: patient    Overall Patient Progress: improvingOverall Patient Progress: improving    Outcome Evaluation: VSS overnight, RA, pain managed with PRN oxy/dilauded. x2 CT to x1 atrium. Pt slow to move but ambulates wel with Ax1.

## 2025-05-03 NOTE — PROVIDER NOTIFICATION
"Provider Notified: Ambreen Pak MD.   Notified via: [employer]  Notified @: [phone]    Pt complains of new chest, jaw, and shoulder pain., pt previously complains of right shoulder and back pain but states it has spread to left shoulder, jaw, and chest. MD paged, EKG and CXR ordered. Vitals stable. PRN dilaudid given, per pt little relief. PT describes jaw pain as \"nerve pain\", and describes chest pain as sharp - pt also mentioned he felt this chest pain for hours after his procedure.     Per MD, EKG okay, vitals stable, ok to monitor and wait for CXR result. No new orders at this time.   "

## 2025-05-04 ENCOUNTER — APPOINTMENT (OUTPATIENT)
Dept: CARDIOLOGY | Facility: CLINIC | Age: 45
End: 2025-05-04
Attending: STUDENT IN AN ORGANIZED HEALTH CARE EDUCATION/TRAINING PROGRAM
Payer: COMMERCIAL

## 2025-05-04 ENCOUNTER — APPOINTMENT (OUTPATIENT)
Dept: GENERAL RADIOLOGY | Facility: CLINIC | Age: 45
End: 2025-05-04
Attending: STUDENT IN AN ORGANIZED HEALTH CARE EDUCATION/TRAINING PROGRAM
Payer: COMMERCIAL

## 2025-05-04 ENCOUNTER — APPOINTMENT (OUTPATIENT)
Dept: OCCUPATIONAL THERAPY | Facility: CLINIC | Age: 45
End: 2025-05-04
Attending: SURGERY
Payer: COMMERCIAL

## 2025-05-04 LAB
ANION GAP SERPL CALCULATED.3IONS-SCNC: 12 MMOL/L (ref 7–15)
ATRIAL RATE - MUSE: NORMAL BPM
BUN SERPL-MCNC: 10.5 MG/DL (ref 6–20)
CALCIUM SERPL-MCNC: 8.6 MG/DL (ref 8.8–10.4)
CHLORIDE SERPL-SCNC: 102 MMOL/L (ref 98–107)
CREAT SERPL-MCNC: 0.67 MG/DL (ref 0.67–1.17)
DIASTOLIC BLOOD PRESSURE - MUSE: NORMAL MMHG
EGFRCR SERPLBLD CKD-EPI 2021: >90 ML/MIN/1.73M2
ERYTHROCYTE [DISTWIDTH] IN BLOOD BY AUTOMATED COUNT: 12.7 % (ref 10–15)
GLUCOSE SERPL-MCNC: 116 MG/DL (ref 70–99)
HCO3 SERPL-SCNC: 24 MMOL/L (ref 22–29)
HCT VFR BLD AUTO: 36.5 % (ref 40–53)
HGB BLD-MCNC: 12.6 G/DL (ref 13.3–17.7)
INTERPRETATION ECG - MUSE: NORMAL
LVEF ECHO: NORMAL
MAGNESIUM SERPL-MCNC: 2 MG/DL (ref 1.7–2.3)
MCH RBC QN AUTO: 30.6 PG (ref 26.5–33)
MCHC RBC AUTO-ENTMCNC: 34.5 G/DL (ref 31.5–36.5)
MCV RBC AUTO: 89 FL (ref 78–100)
P AXIS - MUSE: NORMAL DEGREES
PHOSPHATE SERPL-MCNC: 2.7 MG/DL (ref 2.5–4.5)
PLATELET # BLD AUTO: 167 10E3/UL (ref 150–450)
POTASSIUM SERPL-SCNC: 3.7 MMOL/L (ref 3.4–5.3)
PR INTERVAL - MUSE: NORMAL MS
QRS DURATION - MUSE: 82 MS
QT - MUSE: 332 MS
QTC - MUSE: 469 MS
R AXIS - MUSE: -10 DEGREES
RBC # BLD AUTO: 4.12 10E6/UL (ref 4.4–5.9)
SODIUM SERPL-SCNC: 138 MMOL/L (ref 135–145)
SYSTOLIC BLOOD PRESSURE - MUSE: NORMAL MMHG
T AXIS - MUSE: -12 DEGREES
VENTRICULAR RATE- MUSE: 120 BPM
WBC # BLD AUTO: 9.2 10E3/UL (ref 4–11)

## 2025-05-04 PROCEDURE — 271N000002 HC RX 271: Performed by: STUDENT IN AN ORGANIZED HEALTH CARE EDUCATION/TRAINING PROGRAM

## 2025-05-04 PROCEDURE — 250N000011 HC RX IP 250 OP 636: Mod: JZ | Performed by: STUDENT IN AN ORGANIZED HEALTH CARE EDUCATION/TRAINING PROGRAM

## 2025-05-04 PROCEDURE — 250N000013 HC RX MED GY IP 250 OP 250 PS 637: Performed by: SURGERY

## 2025-05-04 PROCEDURE — 85014 HEMATOCRIT: CPT

## 2025-05-04 PROCEDURE — 250N000009 HC RX 250: Performed by: STUDENT IN AN ORGANIZED HEALTH CARE EDUCATION/TRAINING PROGRAM

## 2025-05-04 PROCEDURE — 80048 BASIC METABOLIC PNL TOTAL CA: CPT | Performed by: STUDENT IN AN ORGANIZED HEALTH CARE EDUCATION/TRAINING PROGRAM

## 2025-05-04 PROCEDURE — 36415 COLL VENOUS BLD VENIPUNCTURE: CPT | Performed by: STUDENT IN AN ORGANIZED HEALTH CARE EDUCATION/TRAINING PROGRAM

## 2025-05-04 PROCEDURE — 71046 X-RAY EXAM CHEST 2 VIEWS: CPT | Mod: 26 | Performed by: STUDENT IN AN ORGANIZED HEALTH CARE EDUCATION/TRAINING PROGRAM

## 2025-05-04 PROCEDURE — 93005 ELECTROCARDIOGRAM TRACING: CPT

## 2025-05-04 PROCEDURE — 999N000128 HC STATISTIC PERIPHERAL IV START W/O US GUIDANCE

## 2025-05-04 PROCEDURE — 250N000013 HC RX MED GY IP 250 OP 250 PS 637: Performed by: STUDENT IN AN ORGANIZED HEALTH CARE EDUCATION/TRAINING PROGRAM

## 2025-05-04 PROCEDURE — 99232 SBSQ HOSP IP/OBS MODERATE 35: CPT

## 2025-05-04 PROCEDURE — 120N000005 HC R&B MS OVERFLOW UMMC

## 2025-05-04 PROCEDURE — 97530 THERAPEUTIC ACTIVITIES: CPT | Mod: GO | Performed by: OCCUPATIONAL THERAPIST

## 2025-05-04 PROCEDURE — 250N000013 HC RX MED GY IP 250 OP 250 PS 637: Performed by: PHYSICIAN ASSISTANT

## 2025-05-04 PROCEDURE — 250N000011 HC RX IP 250 OP 636: Performed by: STUDENT IN AN ORGANIZED HEALTH CARE EDUCATION/TRAINING PROGRAM

## 2025-05-04 PROCEDURE — 93010 ELECTROCARDIOGRAM REPORT: CPT | Performed by: INTERNAL MEDICINE

## 2025-05-04 PROCEDURE — 83735 ASSAY OF MAGNESIUM: CPT | Performed by: STUDENT IN AN ORGANIZED HEALTH CARE EDUCATION/TRAINING PROGRAM

## 2025-05-04 PROCEDURE — 250N000013 HC RX MED GY IP 250 OP 250 PS 637

## 2025-05-04 PROCEDURE — 93306 TTE W/DOPPLER COMPLETE: CPT | Mod: 26 | Performed by: INTERNAL MEDICINE

## 2025-05-04 PROCEDURE — 97535 SELF CARE MNGMENT TRAINING: CPT | Mod: GO | Performed by: OCCUPATIONAL THERAPIST

## 2025-05-04 PROCEDURE — 71046 X-RAY EXAM CHEST 2 VIEWS: CPT

## 2025-05-04 PROCEDURE — 84100 ASSAY OF PHOSPHORUS: CPT | Performed by: STUDENT IN AN ORGANIZED HEALTH CARE EDUCATION/TRAINING PROGRAM

## 2025-05-04 PROCEDURE — 93306 TTE W/DOPPLER COMPLETE: CPT

## 2025-05-04 RX ORDER — METOPROLOL TARTRATE 1 MG/ML
5 INJECTION, SOLUTION INTRAVENOUS EVERY 5 MIN PRN
Status: DISCONTINUED | OUTPATIENT
Start: 2025-05-04 | End: 2025-05-07 | Stop reason: HOSPADM

## 2025-05-04 RX ORDER — FUROSEMIDE 40 MG/1
40 TABLET ORAL DAILY
Status: DISCONTINUED | OUTPATIENT
Start: 2025-05-04 | End: 2025-05-04

## 2025-05-04 RX ORDER — AMIODARONE HYDROCHLORIDE 200 MG/1
200 TABLET ORAL DAILY
Status: DISCONTINUED | OUTPATIENT
Start: 2025-05-11 | End: 2025-05-07 | Stop reason: HOSPADM

## 2025-05-04 RX ORDER — AMIODARONE HYDROCHLORIDE 200 MG/1
200 TABLET ORAL 2 TIMES DAILY
Status: DISCONTINUED | OUTPATIENT
Start: 2025-05-08 | End: 2025-05-07 | Stop reason: HOSPADM

## 2025-05-04 RX ORDER — AMIODARONE HYDROCHLORIDE 200 MG/1
400 TABLET ORAL 2 TIMES DAILY
Status: DISCONTINUED | OUTPATIENT
Start: 2025-05-04 | End: 2025-05-07 | Stop reason: HOSPADM

## 2025-05-04 RX ORDER — MAGNESIUM OXIDE 400 MG/1
400 TABLET ORAL EVERY 4 HOURS
Status: COMPLETED | OUTPATIENT
Start: 2025-05-04 | End: 2025-05-04

## 2025-05-04 RX ORDER — POTASSIUM CHLORIDE 750 MG/1
20 TABLET, EXTENDED RELEASE ORAL ONCE
Status: COMPLETED | OUTPATIENT
Start: 2025-05-04 | End: 2025-05-04

## 2025-05-04 RX ORDER — METOPROLOL TARTRATE 1 MG/ML
5 INJECTION, SOLUTION INTRAVENOUS ONCE
Status: COMPLETED | OUTPATIENT
Start: 2025-05-04 | End: 2025-05-04

## 2025-05-04 RX ADMIN — HEPARIN SODIUM 5000 UNITS: 5000 INJECTION, SOLUTION INTRAVENOUS; SUBCUTANEOUS at 20:25

## 2025-05-04 RX ADMIN — GABAPENTIN 200 MG: 100 CAPSULE ORAL at 14:17

## 2025-05-04 RX ADMIN — POTASSIUM & SODIUM PHOSPHATES POWDER PACK 280-160-250 MG 1 PACKET: 280-160-250 PACK at 09:06

## 2025-05-04 RX ADMIN — MAGNESIUM OXIDE TAB 400 MG (241.3 MG ELEMENTAL MG) 400 MG: 400 (241.3 MG) TAB at 12:12

## 2025-05-04 RX ADMIN — GABAPENTIN 200 MG: 100 CAPSULE ORAL at 09:05

## 2025-05-04 RX ADMIN — GABAPENTIN 400 MG: 300 CAPSULE ORAL at 22:59

## 2025-05-04 RX ADMIN — HEPARIN SODIUM 5000 UNITS: 5000 INJECTION, SOLUTION INTRAVENOUS; SUBCUTANEOUS at 04:05

## 2025-05-04 RX ADMIN — ACETAMINOPHEN 975 MG: 325 TABLET, FILM COATED ORAL at 20:24

## 2025-05-04 RX ADMIN — Medication: at 12:05

## 2025-05-04 RX ADMIN — METHOCARBAMOL 1000 MG: 500 TABLET ORAL at 18:06

## 2025-05-04 RX ADMIN — POTASSIUM & SODIUM PHOSPHATES POWDER PACK 280-160-250 MG 1 PACKET: 280-160-250 PACK at 12:11

## 2025-05-04 RX ADMIN — POTASSIUM CHLORIDE 20 MEQ: 750 TABLET, EXTENDED RELEASE ORAL at 09:06

## 2025-05-04 RX ADMIN — METHOCARBAMOL 1000 MG: 500 TABLET ORAL at 09:06

## 2025-05-04 RX ADMIN — AMIODARONE HYDROCHLORIDE 0.5 MG/MIN: 1.8 INJECTION, SOLUTION INTRAVENOUS at 09:08

## 2025-05-04 RX ADMIN — ACETAMINOPHEN 975 MG: 325 TABLET, FILM COATED ORAL at 04:05

## 2025-05-04 RX ADMIN — ASPIRIN 81 MG CHEWABLE TABLET 81 MG: 81 TABLET CHEWABLE at 09:06

## 2025-05-04 RX ADMIN — HEPARIN SODIUM 5000 UNITS: 5000 INJECTION, SOLUTION INTRAVENOUS; SUBCUTANEOUS at 12:12

## 2025-05-04 RX ADMIN — Medication 12.5 MG: at 20:25

## 2025-05-04 RX ADMIN — Medication 12.5 MG: at 09:06

## 2025-05-04 RX ADMIN — METHOCARBAMOL 1000 MG: 500 TABLET ORAL at 12:12

## 2025-05-04 RX ADMIN — OXYCODONE HYDROCHLORIDE 10 MG: 10 TABLET ORAL at 22:58

## 2025-05-04 RX ADMIN — METOPROLOL TARTRATE 5 MG: 5 INJECTION INTRAVENOUS at 17:35

## 2025-05-04 RX ADMIN — MAGNESIUM OXIDE TAB 400 MG (241.3 MG ELEMENTAL MG) 400 MG: 400 (241.3 MG) TAB at 09:06

## 2025-05-04 RX ADMIN — Medication 10 MG: at 20:25

## 2025-05-04 RX ADMIN — AMIODARONE HYDROCHLORIDE 400 MG: 200 TABLET ORAL at 20:24

## 2025-05-04 RX ADMIN — FUROSEMIDE 40 MG: 40 TABLET ORAL at 09:06

## 2025-05-04 RX ADMIN — ACETAMINOPHEN 975 MG: 325 TABLET, FILM COATED ORAL at 12:12

## 2025-05-04 RX ADMIN — HYDROXYZINE HYDROCHLORIDE 25 MG: 25 TABLET ORAL at 04:05

## 2025-05-04 ASSESSMENT — ACTIVITIES OF DAILY LIVING (ADL)
ADLS_ACUITY_SCORE: 39
ADLS_ACUITY_SCORE: 44
ADLS_ACUITY_SCORE: 39
ADLS_ACUITY_SCORE: 39
ADLS_ACUITY_SCORE: 44
ADLS_ACUITY_SCORE: 44
ADLS_ACUITY_SCORE: 39
ADLS_ACUITY_SCORE: 44
ADLS_ACUITY_SCORE: 44
ADLS_ACUITY_SCORE: 39
ADLS_ACUITY_SCORE: 44
ADLS_ACUITY_SCORE: 39
ADLS_ACUITY_SCORE: 44

## 2025-05-04 NOTE — PROGRESS NOTES
Care Management Follow Up    Length of Stay (days): 4    Expected Discharge Date: 05/06/2025     Concerns to be Addressed:     new healthcare directive  Patient plan of care discussed at interdisciplinary rounds: Yes    Anticipated Discharge Disposition:  n/a  Anticipated Discharge Services:  n/a  Anticipated Discharge DME:  n/a    Patient/family educated on Medicare website which has current facility and service quality ratings:  n/a  Education Provided on the Discharge Plan:  n/a  Patient/Family in Agreement with the Plan:  n/a    Referrals Placed by CM/SW:  n/a  Private pay costs discussed: Not applicable    Discussed  Partnership in Safe Discharge Planning  document with patient/family: No     Handoff Completed: No, handoff not indicated or clinically appropriate    Additional Information:  Per bedside RN, pt would like a blank HCD to review/likely fill out. S printed blank HCD and Honoring Choices handout for bedside RN to give to pt. SW also provided education on notary process.     Next Steps: SW to check in on status of HCD (if pt would like to fill it out, schedule notary).    Martínez Ferraro, MITCHEL, SARAH  5/4/2025  Unit 6C weekend      Social Work and Care Management Department       SEARCHABLE in [employer] - search SOCIAL WORK       [facility] [place] (0800 - 1630) Saturday and Sunday     Units: 4A Vocera, 4C Vocera, & 4E Vocera        Units: 5A 4762-3689 Vocera, 5A 1145-4858 Vocera , BMT SW 1 BMT SW 2, BMT SW 3 & BMT SW 4  5C Off Service 5401 - 5416  5C Off Service 5900-1140     Units: 6A Vocera & 6B Vocera      Units: 6C Vocera     Units: 7A Vocera & 7B Vocera      Units: 7C Med Surg 7401 thru 7418 and 7C Med Surg 7502 thru 7521      Unit: [facility] ED Vocera & [facility] Obs Vocera     [facility] ([phone]) Saturday and Sunday      Units: 5 Ortho Vocera, 5 Med Surg Vocera & WB ED Vocera     Units: 6 Med Surg Vocera, 8 Med Surg Vocera, & 10 ICU Vocera      After hours Vocera [facility] and After Hours Vocera  [place]     Please NOTE changes to times below:    **Saturday & Sunday (1630 - 2030)    **Mon-Fri (1600-2030)     **[facility] Recognized Holidays  (0301-2917)    Units: ALL   - see above VOCERA links to units

## 2025-05-04 NOTE — PROGRESS NOTES
"Brief Surgery Crossover Note    Paged by RN re: Afib w/ RVR.    Stat EKG ordered.    Assessed pt at bedside. Currently on A Flutter, HR 140s-150s.     Johnie is resting in bed. He endorses palpitations. Denies chest pain, SOB, dizziness. Accompanied by family at bedside. IV Amiodarone drip running.     /75 (BP Location: Right arm)   Pulse (!) 163   Temp 100.1  F (37.8  C) (Oral)   Resp 18   Ht 1.803 m (5' 11\")   Wt 84.1 kg (185 lb 6.4 oz)   SpO2 99%   BMI 25.86 kg/m      General: alert and oriented, in no acute distress  CV: Atrial Flutter, HR 140s-150s. Peripheral pulses present.   Resp: no increased work of breathing   GI:  soft, non-distended, no focal tenderness, no guarding or rigidity   Extremities: no significant pitting edema     EKG with Atrial Flutter.    -- IV Metoprolol 5 mg PRN x 3 doses  -- Continue IV Amiodarone  -- Please page with questions or concerns.    Brennen Ross MD  General Surgery PGY-1  Pager: [phone]      **For on call schedules and contact information, please refer to [facility]**      "

## 2025-05-04 NOTE — PLAN OF CARE
Team: CVJANNETTE    Shift: 2300-0730.    Shift events: pt converted into a flutter for ~8 minutes and back to SR without intervention. No further rhythm changes rest of shift. see provider notification note.     Neuro: A&Ox4.   Cardiac: Afebrile, VSS.            Respiratory: RA - denies SOB overnight.   GI/: Voiding spontaneously. X1 medium BM.  Diet/appetite: Tolerating regular diet. Denies nausea.  Activity: Up with standby assist  Pain: Back pain/shoulder pain. Tylenol + atarax given with relief.    Skin: epidural with dried drainage around site, team aware. Chest tubes pulled on previous shift.   Lines: x2L PIV x1 PIV running amio gtt @ 0.5mg/min.  Ripivocaine to epidural continuous @ 10 ml / hr.    Provider Communication: see note    Shift: 1900-0730      Silvestre BENITO RN. 6C cardiology.       Goal Outcome Evaluation:      Plan of Care Reviewed With: patient    Overall Patient Progress: no changeOverall Patient Progress: no change    Outcome Evaluation: AOx4, pleasant. VSS. pt converted into A flutter for ~8 minutes but self converted back to SR without medication. Otherwise VSS. RA. Pt moving better tonight and SBA. Continent.

## 2025-05-04 NOTE — PLAN OF CARE
Neuro: A&Ox4.   Cardiac: AFIB/Aflutter VSS.   Respiratory: Sating 98 on RA.  GI/: Adequate urine output. BM X1  Diet/appetite: Tolerating regular diet. Eating well.  Activity: IND up to chair and in halls.  Pain: pt c/o 2/10 incisional pain, no PRNS given   Skin: 2 old ct site - CDI  R chest incision - CARISA  R groin site - CARISA   LDA's:  PIV - infusing amio  PIV - SL   Ropivacaine infusing 10ml/hr   Plan: Continue with POC. Notify primary team with changes.

## 2025-05-04 NOTE — PROGRESS NOTES
Pain Service Progress Note  [facility]  Date: 05/04/2025       Patient Name: Johnie Buenrostro  MRN: [id number]  Age: 45 year old  Sex: male      Assessment:  Johnie Buenrostro is a 45 year old male who has PMH of ADHD and mitral valve regurgitation and is now s/p minimally invasive mitral valve repair by Dr. Maxwell on 4/30/2025.     Date of Catheter Placement: 4/30/25     Plan/Recommendations:  1. Regional Anesthesia/Analgesia  -Continuous Catheter Type/Site: right paravertebral (PV) T6-7  Infusate: ropivacaine 0.2%     Programmed Intermittent Bolus (PIB) at 12 mL Q60 min changed to continuous rate at 10 mL yesterday.  He states that his pain is much improved.  It is 2/10 today.     Plan to maintain catheter approximately of 7 days     2. Anticoagulation  -Please contact Inpatient Pain Service before ordering or making any anticoagulation changes  heparin ANTICOAGULANT injection 5,000 Units    5,000 Units, SC, Q8H         3. Multimodal Analgesia  - opioid naive at baseline  - per primary service: oxycodone 10mg and IV Dilaudid 0.4 mg     Pain Service will continue to follow.     Karel Rosales IV, MD  [facility]  05/04/2025      Overnight Events: No major acute event.    Tubes/Drains: Chest tubes removed 5/3    Subjective: Feels pain is manageable.      Symptoms of LAST: No    Pain Location:  Right chest/right shoulder    Pain Intensity:    Pain at Rest: 3/10   Pain with Activity: 4/10    Satisfied with your level of pain control: Yes    Diet: Advance Diet as Tolerated: Regular Diet Adult    Relevant Labs:  Recent Labs   Lab Test 05/02/25  0452 04/30/25 2004 04/30/25  1310   PROTIME  --   --  16.4*   INR  --   --  1.33*      < > 170   PTT  --   --  27   BUN 9.6   < > 16.4    < > = values in this interval not displayed.       Physical Exam:  Vitals: BP 93/76   Pulse (!) 135   Temp 98.7  F (37.1  C) (Oral)   Resp 12  "   1.803 m (5' 11\")   Wt 84.2 kg (185 lb 9.6 oz)   SpO2 93%   BMI 25.89 kg/m      Physical Exam:   Orientation:  Alert, oriented, and in no acute distress: Yes  Sedation: No    Motor Examination:  5/5 Strength in lower extremities: Yes      Catheter Site:   Catheter entry site is clean/dry/intact: Yes    Tender: No      Relevant Medications:  Current Pain Medications:  Medications related to Pain Management (From now, onward)      Start     Dose/Rate Route Frequency Ordered Stop    05/03/25 0000  bisacodyl (DULCOLAX) suppository 10 mg         10 mg Rectal DAILY PRN 04/30/25 1256      05/02/25 2200  gabapentin (NEURONTIN) capsule 400 mg         400 mg Oral AT BEDTIME 05/01/25 2230 05/02/25 0800  methocarbamol (ROBAXIN) tablet 1,000 mg         1,000 mg Oral 4 TIMES DAILY 05/01/25 2223      05/02/25 0800  gabapentin (NEURONTIN) capsule 200 mg         200 mg Oral 2 TIMES DAILY. 05/01/25 2225      05/02/25 0000  magnesium hydroxide (MILK OF MAGNESIA) suspension 30 mL         30 mL Oral DAILY PRN 04/30/25 1256      05/01/25 2000  senna-docusate (SENOKOT-S/PERICOLACE) 8.6-50 MG per tablet 2 tablet         2 tablet Oral 2 TIMES DAILY 05/01/25 1007      05/01/25 1300  ROPivacaine 0.2% in sodium chloride 0.9% PERINEURAL infusion          Perineural Continuous Nerve Block 05/01/25 1246      05/01/25 1009  hydrOXYzine HCl (ATARAX) tablet 25 mg        Placed in \"Or\" Linked Group    25 mg Oral EVERY 6 HOURS PRN 05/01/25 1016      05/01/25 1009  hydrOXYzine HCl (ATARAX) tablet 50 mg        Placed in \"Or\" Linked Group    50 mg Oral EVERY 6 HOURS PRN 05/01/25 1016      05/01/25 0824  oxyCODONE (ROXICODONE) tablet 5 mg        Placed in \"Or\" Linked Group    5 mg Oral EVERY 3 HOURS PRN 05/01/25 0824      05/01/25 0824  oxyCODONE IR (ROXICODONE) tablet 10 mg        Placed in \"Or\" Linked Group    10 mg Oral EVERY 3 HOURS PRN 05/01/25 0824      05/01/25 0800  polyethylene glycol (MIRALAX) Packet 17 g         17 g Oral DAILY " "04/30/25 1256      05/01/25 0800  aspirin (ASA) chewable tablet 81 mg         81 mg Oral or NG Tube DAILY 04/30/25 1256      04/30/25 1300  acetaminophen (TYLENOL) tablet 975 mg         975 mg Oral EVERY 8 HOURS 04/30/25 1256      04/30/25 1256  HYDROmorphone (DILAUDID) injection 0.2 mg        Placed in \"Or\" Linked Group    0.2 mg Intravenous EVERY 2 HOURS PRN 04/30/25 1256      04/30/25 1256  HYDROmorphone (DILAUDID) injection 0.4 mg        Placed in \"Or\" Linked Group    0.4 mg Intravenous EVERY 2 HOURS PRN 04/30/25 1256      04/30/25 1256  lidocaine 1 % 0.1-1 mL         0.1-1 mL Other EVERY 1 HOUR PRN 04/30/25 1256      04/30/25 1256  lidocaine (LMX4) cream          Topical EVERY 1 HOUR PRN 04/30/25 1256              Primary Service Contacted with Recommendations? Yes            Acute Inpatient Pain Service [facility]  Hours of pain coverage 24/7   Please Page via [employer]  - Link to [employer] Here - Search Pain  Page via [email or web address]- Please Page the Pain Team Via Am[employer]: \"PAIN MANAGEMENT ACUTE INPATIENT/ [employer]\"            "

## 2025-05-04 NOTE — PROGRESS NOTES
Cardiovascular Surgery Progress Note  05/04/2025         Assessment and Plan:     Johnie Buenrostro is a 45 year old male with a PMH of ADHD, severe mitral valve regurgitation. Patient is now s/p right mini thoracotomy, mitral repair on 4/30/2025 with Dr. Maxwell. ICU course unremarkable, transferred to [facility] on postop day 2.    Cardiovascular:   Hx of severe mitral regurgitation 2/2 bileaflet prolapse s/p minimally invasive MV repair  Postop paroxysmal rapid afib/aflutter  HD stable, afib/flutter yesterday afternoon until about 2330, now sinus rhythm with frequent PACs. MAP 74-86.  Most recent preop echo demonstrated LVEF 55-60%, severe mitral insufficiency  ASA 81 mg daily  Statin not indicated  Amiodarone gtt will transition to PO this evening  Start metoprolol 12.5 mg BID for fib  Postop echo today, read pending    Chest tubes and TPW: removed 5/3    Pulmonary:  Extubated POD 0. Now saturating well on RA  Supplemental O2 PRN to keep sats > 92%.   Continue aggressive pulm hygiene, IS, activity and deep breathing    Neurology / MSK:  Acute post-operative pain  ADHD  Pain well controlled with current regimen  Scheduled: tylenol, gabapentin, robaxin  PRN: dilaudid, atarax, oxycodone  HOLD PTA adderall XR  Acute pain services following, managing peripheral nerve catheters while chest tubes in place, appreciate assistance. Changed to continuous 5/3 with improvement in pain.     / Renal / Fluid / Electrolytes:  BL creat ~ 0.8, most recent creatinine 0.67; adequate UOP.   FLUID STATUS: Pre-op weight 191 lbs, weight today 185; I/O past 24 hours: inaccurate  Diuresis: po lasix x1, then discontinue and spot diurese as necessary, assess daily  Replete lytes per protocol  Strict I/O, daily weights  Avoid/limit nephrotoxins as able    GI / Nutrition:   Risk for protein calorie malnutrition  Tolerating regular diet  Continue bowel regimen, last BM 5/4    Endocrine:  Stress induced hyperglycemia, resolved  Hgb A1c 5.1%  Initially  "managed on insulin drip postop, transitioned to sliding scale now discontinued due to no needs  Goal BG <180 for optimal healing    Infectious Disease:  Stress induced leukocytosis, resolved  WBC 9.2, remains afebrile, no signs or symptoms of infection  Completed perioperative antibiotics  Continue to monitor fever curve, CBC    Hematology:   Acute blood loss anemia, resolved  Hgb 12.6; Plt WNL, no signs or symptoms of active bleeding  CBC daily    Anticoagulation:   ASA 81 mg daily  Hold off on anticoagulation for afib today, episode <12 hours. Continue to monitor for recurrence and need as indicated.    MSK/Skin:  Right mini thoracotomy  Surgical incision  Sternal precautions  Incisional cares per protocol    Prophylaxis:   Stress ulcer prophylaxis: Pantoprazole 40 mg daily  DVT prophylaxis: Subcutaneous heparin, SCD    Medically Ready for Discharge: Anticipated in 2-4 Days       Clinically Significant Risk Factors           # Hypocalcemia: Lowest Ca = 8.6 mg/dL in last 2 days, will monitor and replace as appropriate                      # Overweight: Estimated body mass index is 25.89 kg/m  as calculated from the following:    Height as of this encounter: 1.803 m (5' 11\").    Weight as of this encounter: 84.2 kg (185 lb 9.6 oz).               Disposition:   Transferred to  on 5/2  Therapies recommending discharge to home with CR pending stable heart rhythm, postop echo, adequate pain control with PO medications only    Dr. Maxwell has been informed of changes through written communication.      Chelsi Jaquez PA-C  Cardiothoracic Surgery  Available on Vocera        Interval History:     In and out of aflutter overnight, patient HD stable. Feels better this morning, dyspnea improved, somewhat less tired.  Pain better today, slept better overnight.  Encouraged increased IS and activity today.         Physical Exam:     Gen: A&Ox4, NAD, resting in bed  Neuro: Intact with no focal deficits   CV: NSR with frequent " "PACs on monitor, normal S1 S2, no murmurs, rubs or gallops  Pulm: CTA, no wheezing or rhonchi, normal breathing on RA  Abd: nondistended, normal BS, soft, nontender  Ext: no peripheral edema  Incision: right chest incisions clean, dry, intact, no erythema. Right groin incision c/d/i, no erythema.  Tubes/drain sites: dressing with small amount of serosanguinous drainage         Data:    /72 (Cuff Size: Adult Regular)   Pulse 96   Temp 99.4  F (37.4  C) (Oral)   Resp 16   Ht 1.803 m (5' 11\")   Wt 84.2 kg (185 lb 9.6 oz)   SpO2 99%   BMI 25.89 kg/m       I/O last 3 completed shifts:  In: 1700 [P.O.:1700]  Out: 500 [Urine:500]    Imaging:  reviewed recent imaging, no acute concerns    Recent Results (from the past 24 hours)   XR Chest Port 1 View    Narrative    Exam: Chest 1 view portable 5/3/2025 11:07 AM     History:  chest tube removal    Comparison: same day at 06 38    Findings: Cardiac silhouette is enlarged. Mitral valve replacement.  Azygos fissure variant. Right chest tube is been removed. No  pneumothorax. Paravertebral anesthesia catheter on the right.  Bibasilar atelectasis. Interstitial opacities bilaterally.      Impression    IMPRESSION: No evidence of pneumothorax post right chest tube removal.    ARBEN SWANSON MD         SYSTEM ID:  [id number]          Labs:  Recent Labs   Lab 05/04/25  0531 05/03/25  0647 05/02/25  0452 04/30/25  1614 04/30/25  1310 04/30/25  1157 04/30/25  1151   WBC 9.2 9.6 10.7   < > 11.6*  --   --    HGB 12.6* 13.9 12.5*   < > 12.7*   < >  --    MCV 89 92 91   < > 89  --   --     165 155   < > 170  --  194   INR  --   --   --   --  1.33*  --  1.33*    136 137   < > 141   < >  --    POTASSIUM 3.7 4.0  4.0 4.2   < > 4.2   < >  --    CHLORIDE 102 101 102   < > 106  --   --    CO2 24 23 26   < > 23  --   --    BUN 10.5 6.8 9.6   < > 16.4  --   --    CR 0.67 0.65* 0.65*   < > 0.89  --   --    ANIONGAP 12 12 9   < > 12  --   --    JOSE MARIA 8.6* 8.9 8.7*   < > 8.6*  " --   --    * 113* 114*   < > 155*   < >  --    ALBUMIN  --  3.8 3.9   < > 3.8  --   --    PROTTOTAL  --  6.8 6.4   < > 5.6*  --   --    BILITOTAL  --  0.8 0.7   < > 1.2  --   --    ALKPHOS  --  49 45   < > 45  --   --    ALT  --  22 21   < > 23  --   --    AST  --  35 50*   < > 74*  --   --     < > = values in this interval not displayed.      GLUCOSE:   Recent Labs   Lab 05/04/25  0531 05/03/25  0647 05/02/25  0452 05/01/25  0842 05/01/25  0552 05/01/25  0403   * 113* 114* 123* 127* 136*

## 2025-05-04 NOTE — PROVIDER NOTIFICATION
Provider Notified: Brennen Ross MD. David Garcia MD.   Notified via: [employer]  Notified @: 2038 5/3.    Pt flipped into A flutter from SR around 2355. MD contacted via [email or web address]. Pt denies symptoms with /78 (86). Plan is to administer one dose of metoprolol if pt not able to self convert after 15 minutes. If pt does not convert after medication admin, plan to contact cards for further instruction. Pt self converted back to SR after ~7 minutes, no medication administered at this time.

## 2025-05-05 ENCOUNTER — APPOINTMENT (OUTPATIENT)
Dept: OCCUPATIONAL THERAPY | Facility: CLINIC | Age: 45
End: 2025-05-05
Attending: SURGERY
Payer: COMMERCIAL

## 2025-05-05 LAB
ANION GAP SERPL CALCULATED.3IONS-SCNC: 11 MMOL/L (ref 7–15)
BUN SERPL-MCNC: 11.8 MG/DL (ref 6–20)
CALCIUM SERPL-MCNC: 8.9 MG/DL (ref 8.8–10.4)
CHLORIDE SERPL-SCNC: 104 MMOL/L (ref 98–107)
CREAT SERPL-MCNC: 0.7 MG/DL (ref 0.67–1.17)
CRP SERPL-MCNC: 192 MG/L
EGFRCR SERPLBLD CKD-EPI 2021: >90 ML/MIN/1.73M2
ERYTHROCYTE [DISTWIDTH] IN BLOOD BY AUTOMATED COUNT: 12.7 % (ref 10–15)
GLUCOSE SERPL-MCNC: 104 MG/DL (ref 70–99)
HCO3 SERPL-SCNC: 22 MMOL/L (ref 22–29)
HCT VFR BLD AUTO: 35.7 % (ref 40–53)
HGB BLD-MCNC: 12.1 G/DL (ref 13.3–17.7)
MAGNESIUM SERPL-MCNC: 2 MG/DL (ref 1.7–2.3)
MCH RBC QN AUTO: 30.8 PG (ref 26.5–33)
MCHC RBC AUTO-ENTMCNC: 33.9 G/DL (ref 31.5–36.5)
MCV RBC AUTO: 91 FL (ref 78–100)
PHOSPHATE SERPL-MCNC: 2.9 MG/DL (ref 2.5–4.5)
PLATELET # BLD AUTO: 199 10E3/UL (ref 150–450)
POTASSIUM SERPL-SCNC: 3.8 MMOL/L (ref 3.4–5.3)
POTASSIUM SERPL-SCNC: 4.1 MMOL/L (ref 3.4–5.3)
RBC # BLD AUTO: 3.93 10E6/UL (ref 4.4–5.9)
SODIUM SERPL-SCNC: 137 MMOL/L (ref 135–145)
WBC # BLD AUTO: 8 10E3/UL (ref 4–11)

## 2025-05-05 PROCEDURE — 93005 ELECTROCARDIOGRAM TRACING: CPT

## 2025-05-05 PROCEDURE — 250N000011 HC RX IP 250 OP 636: Mod: JZ | Performed by: STUDENT IN AN ORGANIZED HEALTH CARE EDUCATION/TRAINING PROGRAM

## 2025-05-05 PROCEDURE — 99232 SBSQ HOSP IP/OBS MODERATE 35: CPT | Mod: GC

## 2025-05-05 PROCEDURE — 120N000005 HC R&B MS OVERFLOW UMMC

## 2025-05-05 PROCEDURE — 250N000013 HC RX MED GY IP 250 OP 250 PS 637: Performed by: SURGERY

## 2025-05-05 PROCEDURE — 36415 COLL VENOUS BLD VENIPUNCTURE: CPT | Performed by: STUDENT IN AN ORGANIZED HEALTH CARE EDUCATION/TRAINING PROGRAM

## 2025-05-05 PROCEDURE — 250N000013 HC RX MED GY IP 250 OP 250 PS 637: Performed by: STUDENT IN AN ORGANIZED HEALTH CARE EDUCATION/TRAINING PROGRAM

## 2025-05-05 PROCEDURE — 84100 ASSAY OF PHOSPHORUS: CPT | Performed by: SURGERY

## 2025-05-05 PROCEDURE — 250N000011 HC RX IP 250 OP 636: Performed by: STUDENT IN AN ORGANIZED HEALTH CARE EDUCATION/TRAINING PROGRAM

## 2025-05-05 PROCEDURE — 80048 BASIC METABOLIC PNL TOTAL CA: CPT | Performed by: STUDENT IN AN ORGANIZED HEALTH CARE EDUCATION/TRAINING PROGRAM

## 2025-05-05 PROCEDURE — 271N000002 HC RX 271: Performed by: STUDENT IN AN ORGANIZED HEALTH CARE EDUCATION/TRAINING PROGRAM

## 2025-05-05 PROCEDURE — 84132 ASSAY OF SERUM POTASSIUM: CPT | Performed by: STUDENT IN AN ORGANIZED HEALTH CARE EDUCATION/TRAINING PROGRAM

## 2025-05-05 PROCEDURE — 83735 ASSAY OF MAGNESIUM: CPT | Performed by: SURGERY

## 2025-05-05 PROCEDURE — 86140 C-REACTIVE PROTEIN: CPT | Performed by: STUDENT IN AN ORGANIZED HEALTH CARE EDUCATION/TRAINING PROGRAM

## 2025-05-05 PROCEDURE — 250N000013 HC RX MED GY IP 250 OP 250 PS 637: Performed by: PHYSICIAN ASSISTANT

## 2025-05-05 PROCEDURE — 93010 ELECTROCARDIOGRAM REPORT: CPT | Performed by: INTERNAL MEDICINE

## 2025-05-05 PROCEDURE — 250N000013 HC RX MED GY IP 250 OP 250 PS 637

## 2025-05-05 PROCEDURE — 99254 IP/OBS CNSLTJ NEW/EST MOD 60: CPT | Mod: 24 | Performed by: INTERNAL MEDICINE

## 2025-05-05 PROCEDURE — 85027 COMPLETE CBC AUTOMATED: CPT

## 2025-05-05 PROCEDURE — 97535 SELF CARE MNGMENT TRAINING: CPT | Mod: GO

## 2025-05-05 RX ORDER — MAGNESIUM OXIDE 400 MG/1
400 TABLET ORAL EVERY 4 HOURS
Status: COMPLETED | OUTPATIENT
Start: 2025-05-05 | End: 2025-05-05

## 2025-05-05 RX ORDER — COLCHICINE 0.6 MG
0.3 TABLET ORAL DAILY
Status: DISCONTINUED | OUTPATIENT
Start: 2025-05-05 | End: 2025-05-07 | Stop reason: HOSPADM

## 2025-05-05 RX ORDER — POTASSIUM CHLORIDE 750 MG/1
20 TABLET, EXTENDED RELEASE ORAL ONCE
Status: COMPLETED | OUTPATIENT
Start: 2025-05-05 | End: 2025-05-05

## 2025-05-05 RX ORDER — POLYETHYLENE GLYCOL 3350 17 G/17G
17 POWDER, FOR SOLUTION ORAL 2 TIMES DAILY
Status: DISCONTINUED | OUTPATIENT
Start: 2025-05-05 | End: 2025-05-07 | Stop reason: HOSPADM

## 2025-05-05 RX ORDER — FUROSEMIDE 40 MG/1
40 TABLET ORAL DAILY
Status: DISCONTINUED | OUTPATIENT
Start: 2025-05-05 | End: 2025-05-07 | Stop reason: HOSPADM

## 2025-05-05 RX ORDER — METOPROLOL TARTRATE 1 MG/ML
5 INJECTION, SOLUTION INTRAVENOUS ONCE
Status: DISCONTINUED | OUTPATIENT
Start: 2025-05-05 | End: 2025-05-05

## 2025-05-05 RX ADMIN — OXYCODONE HYDROCHLORIDE 10 MG: 10 TABLET ORAL at 04:51

## 2025-05-05 RX ADMIN — MAGNESIUM OXIDE TAB 400 MG (241.3 MG ELEMENTAL MG) 400 MG: 400 (241.3 MG) TAB at 11:56

## 2025-05-05 RX ADMIN — SENNOSIDES AND DOCUSATE SODIUM 1 TABLET: 50; 8.6 TABLET ORAL at 19:44

## 2025-05-05 RX ADMIN — Medication 12.5 MG: at 13:56

## 2025-05-05 RX ADMIN — GABAPENTIN 200 MG: 100 CAPSULE ORAL at 13:56

## 2025-05-05 RX ADMIN — AMIODARONE HYDROCHLORIDE 400 MG: 200 TABLET ORAL at 08:01

## 2025-05-05 RX ADMIN — ACETAMINOPHEN 975 MG: 325 TABLET, FILM COATED ORAL at 19:43

## 2025-05-05 RX ADMIN — MAGNESIUM OXIDE TAB 400 MG (241.3 MG ELEMENTAL MG) 400 MG: 400 (241.3 MG) TAB at 08:02

## 2025-05-05 RX ADMIN — Medication: at 09:32

## 2025-05-05 RX ADMIN — GABAPENTIN 200 MG: 100 CAPSULE ORAL at 08:01

## 2025-05-05 RX ADMIN — Medication 12.5 MG: at 02:40

## 2025-05-05 RX ADMIN — OXYCODONE HYDROCHLORIDE 10 MG: 10 TABLET ORAL at 11:06

## 2025-05-05 RX ADMIN — POTASSIUM CHLORIDE 20 MEQ: 750 TABLET, EXTENDED RELEASE ORAL at 10:37

## 2025-05-05 RX ADMIN — METHOCARBAMOL 1000 MG: 500 TABLET ORAL at 11:56

## 2025-05-05 RX ADMIN — HYDROXYZINE HYDROCHLORIDE 50 MG: 50 TABLET ORAL at 02:50

## 2025-05-05 RX ADMIN — HEPARIN SODIUM 5000 UNITS: 5000 INJECTION, SOLUTION INTRAVENOUS; SUBCUTANEOUS at 04:52

## 2025-05-05 RX ADMIN — AMIODARONE HYDROCHLORIDE 400 MG: 200 TABLET ORAL at 19:44

## 2025-05-05 RX ADMIN — METHOCARBAMOL 1000 MG: 500 TABLET ORAL at 19:44

## 2025-05-05 RX ADMIN — OXYCODONE HYDROCHLORIDE 10 MG: 10 TABLET ORAL at 08:02

## 2025-05-05 RX ADMIN — HEPARIN SODIUM 5000 UNITS: 5000 INJECTION, SOLUTION INTRAVENOUS; SUBCUTANEOUS at 23:53

## 2025-05-05 RX ADMIN — OXYCODONE HYDROCHLORIDE 5 MG: 5 TABLET ORAL at 21:54

## 2025-05-05 RX ADMIN — COLCHICINE 0.3 MG: 0.6 TABLET, FILM COATED ORAL at 17:32

## 2025-05-05 RX ADMIN — ACETAMINOPHEN 975 MG: 325 TABLET, FILM COATED ORAL at 04:51

## 2025-05-05 RX ADMIN — Medication 12.5 MG: at 19:44

## 2025-05-05 RX ADMIN — Medication 12.5 MG: at 08:01

## 2025-05-05 RX ADMIN — POTASSIUM CHLORIDE 20 MEQ: 750 TABLET, EXTENDED RELEASE ORAL at 08:01

## 2025-05-05 RX ADMIN — GABAPENTIN 400 MG: 300 CAPSULE ORAL at 21:53

## 2025-05-05 RX ADMIN — ACETAMINOPHEN 975 MG: 325 TABLET, FILM COATED ORAL at 11:56

## 2025-05-05 RX ADMIN — OXYCODONE HYDROCHLORIDE 5 MG: 5 TABLET ORAL at 14:22

## 2025-05-05 RX ADMIN — OXYCODONE HYDROCHLORIDE 5 MG: 5 TABLET ORAL at 17:30

## 2025-05-05 RX ADMIN — METHOCARBAMOL 1000 MG: 500 TABLET ORAL at 08:01

## 2025-05-05 RX ADMIN — HEPARIN SODIUM 5000 UNITS: 5000 INJECTION, SOLUTION INTRAVENOUS; SUBCUTANEOUS at 16:58

## 2025-05-05 RX ADMIN — OXYCODONE HYDROCHLORIDE 10 MG: 10 TABLET ORAL at 23:53

## 2025-05-05 RX ADMIN — ASPIRIN 81 MG CHEWABLE TABLET 81 MG: 81 TABLET CHEWABLE at 08:02

## 2025-05-05 RX ADMIN — METHOCARBAMOL 1000 MG: 500 TABLET ORAL at 16:58

## 2025-05-05 RX ADMIN — FUROSEMIDE 40 MG: 40 TABLET ORAL at 08:02

## 2025-05-05 RX ADMIN — Medication 10 MG: at 19:45

## 2025-05-05 ASSESSMENT — ACTIVITIES OF DAILY LIVING (ADL)
ADLS_ACUITY_SCORE: 40

## 2025-05-05 NOTE — PLAN OF CARE
Goal Outcome Evaluation:    Anesthesia removed ropivacaine catheter today. Pain controlled with tylenol, robaxin, gabapentin and oxycodone.  Up independently in room and walked halls with family. R chest incisions C/D/I.  K 3.8-replaced per protocol. Maintaining NSR throughout shift.

## 2025-05-05 NOTE — PROGRESS NOTES
Brief Cardiology Note    Consulted for EKG computer read of Acute MI/STEMI. Patient is a 46 yo M with a past medical history of severe mitral regurgitation status post minimally invasive MV repair on 4/30. EKG obtained today shows convex ST elevations relatively diffusely, most prominently in the anterior and lateral leads. Of note, the patient had an EKG yesterday with similar ST elevations without significant change. I met with the patient at bedside. He denies current chest pain or dyspnea. He recently ambulated from his bed to the bathroom without limiting symptoms. He feels better today than he did yesterday. As such, there is a low suspicion for acute coronary syndrome as the etiology of EKG changes. More consistent with post-operative pericarditis.    Full consult note to follow tomorrow.    Leonel Lloyd MD  Cardiovascular Disease Fellow

## 2025-05-05 NOTE — PLAN OF CARE
Goal Outcome Evaluation:    NURSING PROGRESS NOTE  Shift Summary      Date: May 5, 2025     Neuro/Musculoskeletal:  A&Ox4. Afebrile and able to make needs know  Cardiac:  SR.  VSS.started on metoprolol 12.5mg q6hr, Amio gtt stopped and started on 400 mg oral     Respiratory:  Sating in the 90s on RA .  GI/:  Adequate urine output.  LBM:4/4   Diet/Appetite:  Tolerating regular diet.  Activity:  Independent   Pain:  Rates pain 6-8/10, prn oxycodone 10mg x2, atarax 50mg x1 and schedule tylenol 975mg  Skin:  No new deficits noted.   LDAs + Drips/IVF:  L PIV X2  Protocols/Labs:    K+Mg+Phos  Pertinent Shift Updates:    Pt stable, no new changes noted overnight, will continue to monitor     Plan:    -started metoprolol 12.5mg q6hr A-fib  -Started on oral Amio 400 mg  -Pain management   Joe Eaton RN  .................................................... May 5, 2025   4:36 AM  [facility] ([facility]): [facility] ICU (Unit 6C)

## 2025-05-05 NOTE — PROGRESS NOTES
Cardiovascular Surgery Progress Note  05/05/2025         Assessment and Plan:     Johnie Buenrostro is a 45 year old male with a PMH of ADHD, severe mitral valve regurgitation. Patient is now s/p right mini thoracotomy, mitral repair on 4/30/2025 with Dr. Maxwell. ICU course unremarkable, transferred to [facility] on postop day 2.    Cardiovascular:   Hx of severe mitral regurgitation 2/2 bileaflet prolapse s/p minimally invasive MV repair  Postop paroxysmal rapid afib/aflutter  Possible pericarditis  HD stable, in and out of afib/flutter yesterday with rates decreased to the 90s overnight and converted to normal sinus this morning. MAPs 80s  Most recent preop echo demonstrated LVEF 55-60%, severe mitral insufficiency  Postop echo 5/4 showed LVEF 55-60%, mitral valve s/p repair with trace mitral insufficiency, dilation of IVC with abnormal respiratory variation, no pericardial effusion  ASA 81 mg daily  Statin not indicated  Amiodarone gtt -> now PO taper  Metoprolol 12.5 mg BID for fib  EKG 5/3 and 5/4 with relatively diffuse convex ST elevations, cardiology consulted, likely pericarditis. No chest pain.    Chest tubes and TPW: removed 5/3    Pulmonary:  Extubated POD 0. Now saturating well on RA  Supplemental O2 PRN to keep sats > 92%.   Continue aggressive pulm hygiene, IS, activity and deep breathing    Neurology / MSK:  Acute post-operative pain  ADHD  Pain well controlled with current regimen  Scheduled: tylenol, gabapentin, robaxin  PRN: atarax, oxycodone  HOLD PTA adderall XR  Acute pain services following, managing peripheral nerve catheters while chest tubes in place, appreciate assistance. Changed to continuous 5/3 with improvement in pain. Pain now improved, plan to remove this afternoon. Subcu heparin held.     / Renal / Fluid / Electrolytes:  BL creat ~ 0.8, most recent creatinine 0.70; adequate UOP.   FLUID STATUS: Pre-op weight 191 lbs, weight today 185; I/O past 24 hours: inaccurate  Diuresis: daily PO  "lasix for a few additional days per results of echo, small pleural effusions  Replete lytes per protocol  Strict I/O, daily weights  Avoid/limit nephrotoxins as able    GI / Nutrition:   Risk for protein calorie malnutrition  Hx rectal prolapse  Tolerating regular diet  Continue bowel regimen, last BM 5/4  Patient reports blood sometimes present when needing to strain for BM 2/2 rectal prolapse. Continue increased bowel reg, continue to monitor    Endocrine:  Stress induced hyperglycemia, resolved  Hgb A1c 5.1%  Initially managed on insulin drip postop, transitioned to sliding scale now discontinued due to no needs  Goal BG <180 for optimal healing    Infectious Disease:  Stress induced leukocytosis, resolved  WBC 8.0, remains afebrile, no signs or symptoms of infection  Completed perioperative antibiotics  Continue to monitor fever curve, CBC    Hematology:   Acute blood loss anemia, resolved  Hgb 12.1; Plt WNL, no signs or symptoms of active bleeding  CBC daily    Anticoagulation:   ASA 81 mg daily  Hold off on anticoagulation for afib today. Continue to monitor for recurrence and need as indicated.    MSK/Skin:  Right mini thoracotomy  Surgical incision  Sternal precautions  Incisional cares per protocol    Prophylaxis:   Stress ulcer prophylaxis: Pantoprazole 40 mg daily  DVT prophylaxis: Subcutaneous heparin, SCD       Clinically Significant Risk Factors                                # Overweight: Estimated body mass index is 25.87 kg/m  as calculated from the following:    Height as of this encounter: 1.803 m (5' 11\").    Weight as of this encounter: 84.1 kg (185 lb 8 oz).               Disposition:   Transferred to  on 5/2  Therapies recommending discharge to home with OP CR    Medically Ready for Discharge: Anticipated Tomorrow pending heart rhythm stability and adequate pain control with PO agents only.    Dr. Maxwell has been informed of changes through written communication.      Chelsi Jaquez, " "PA-REGI  Cardiothoracic Surgery  Available on Vocera        Interval History:     In rate controlled afib/flutter overnight, patient HD stable. Converted to NSR this morning. States he feels much better today, less tired/anxious. Denies chest pains, shortness of breath, palpitations.  Pain better today, slept well overnight. Will remove PV catheters and monitor pain control with oral agents, discussed plan with patient and wife.  Increase IS and activity today.         Physical Exam:     Gen: A&Ox4, NAD, sitting in chair  Neuro: Intact with no focal deficits   CV: NSR, normal S1 S2, no murmurs, rubs or gallops  Pulm: CTA, no wheezing or rhonchi, normal breathing on RA  Abd: nondistended, normal BS, soft, nontender  Ext: no peripheral edema  Incision: right chest incisions clean, dry, intact, no erythema. Right groin incision c/d/i, no erythema.  Tubes/drain sites: dressing clean and dry         Data:    /72 (BP Location: Right arm, Cuff Size: Adult Regular)   Pulse 86   Temp 98  F (36.7  C) (Oral)   Resp 17   Ht 1.803 m (5' 11\")   Wt 84.1 kg (185 lb 8 oz)   SpO2 95%   BMI 25.87 kg/m       I/O last 3 completed shifts:  In: 569.32 [I.V.:569.32]  Out: 900 [Urine:900]    Imaging:  reviewed recent imaging, no acute concerns    Recent Results (from the past 24 hours)   Echo Complete   Result Value    LVEF  55-60%    Narrative    [id number]  [id number]  [id number]  [id number]^DONALD^ANDREW     [facility]  Echocardiography Laboratory  [address] [place] [id number]     Name: BATSHEVA VERDUZCO  MRN: [id number]  : 1980  Study Date: 2025 09:51 AM  Age: 45 yrs  Gender: Male  Patient Location: [facility]  Reason For Study: Mitral Valve Repair  Ordering Physician: ANDREW BENNETT  Performed By: Verena Jackson     BSA: 2.0 m2  Height: 71 in  Weight: 185 lb  HR: 93  BP: 105/62 " mmHg  ______________________________________________________________________________  Procedure  Echocardiogram with two-dimensional, color and spectral Doppler.  ______________________________________________________________________________  Interpretation Summary  s/p mitral valve repair with PTFE neochords to the flail P2 segment, closure  of clefts between P1/P2 and P2/P3, implantation of a 32 mm Justice Physio 2  annoloplasty ring 4/30/2025  Trace mitral insufficiency is present. No MS.     Global and regional left ventricular function is normal with an EF of 55-60%.  Left ventricular size is normal.  Left ventricular diastolic function is normal.  The right ventricle is normal size.  Global right ventricular function is normal.  Dilation of the inferior vena cava is present with abnormal respiratory  variation in diameter.  No pericardial effusion is present.     This study was compared with the study from 1/23/2025 .Interim mitral valve  repair for severe MR.  ______________________________________________________________________________  Left Ventricle  Left ventricular size is normal. Global and regional left ventricular function  is normal with an EF of 55-60%. Left ventricular wall thickness is normal.  Left ventricular diastolic function is normal.     Right Ventricle  The right ventricle is normal size. Global right ventricular function is  normal.     Atria  Both atria appear normal.     Mitral Valve  mitral valve repair with PTFE neochords to the flail P2 segment, closure of  clefts between P1/P2 and P2/P3, implantation of a 32 mm Justice Physio 2  annoloplasty ring. Trace mitral insufficiency is present.     Aortic Valve  Aortic valve is normal in structure and function.     Tricuspid Valve  The tricuspid valve is normal. The peak velocity of the tricuspid regurgitant  jet is not obtainable. Pulmonary artery systolic pressure cannot be assessed.     Pulmonic Valve  The pulmonic valve is normal.      Vessels  The aorta root is normal. Dilation of the inferior vena cava is present with  abnormal respiratory variation in diameter.     Pericardium  No pericardial effusion is present.     Compared to Previous Study  This study was compared with the study from 2025 . Interim mitral valve  repair for severe MR.  ______________________________________________________________________________  MMode/2D Measurements & Calculations     IVSd: 0.99 cm  LVIDd: 4.8 cm  LVIDs: 3.4 cm  LVPWd: 0.77 cm  FS: 28.9 %  LV mass(C)d: 142.7 grams  LV mass(C)dI: 70.0 grams/m2  Ao root diam: 3.2 cm  asc Aorta Diam: 3.5 cm  LVOT diam: 2.1 cm  LVOT area: 3.4 cm2  Ao root diam index Ht(cm/m): 1.8  Ao root diam index BSA (cm/m2): 1.6  Asc Ao diam index BSA (cm/m2): 1.7  Asc Ao diam index Ht(cm/m): 1.9  RWT: 0.32     Doppler Measurements & Calculations  MV E max murali: 108.0 cm/sec  MV A max murali: 73.3 cm/sec  MV E/A: 1.5  MV max P.2 mmHg  MV mean PG: 3.1 mmHg  MV V2 VTI: 33.5 cm     MV P1/2t max murali: 124.7 cm/sec  MV P1/2t: 85.3 msec  MVA(P1/2t): 2.6 cm2  MV dec slope: 428.1 cm/sec2  MV dec time: 0.22 sec  Ao V2 max: 174.0 cm/sec  Ao max P.1 mmHg  Ao V2 mean: 107.3 cm/sec  Ao mean P.7 mmHg  Ao V2 VTI: 24.1 cm  PA V2 max: 95.4 cm/sec  PA max PG: 3.6 mmHg  PA acc time: 0.10 sec  E/E' av.9  Lateral E/e': 11.7  Medial E/e': 14.0     ______________________________________________________________________________  Report approved by: GORDON VELARDE MD on 2025 01:21 PM                Labs:  Recent Labs   Lab 25  0535 25  0531 25  0647 25  0452 25  1614 25  1310 25  1157 25  1151   WBC 8.0 9.2 9.6 10.7   < > 11.6*  --   --    HGB 12.1* 12.6* 13.9 12.5*   < > 12.7*   < >  --    MCV 91 89 92 91   < > 89  --   --     167 165 155   < > 170  --  194   INR  --   --   --   --   --  1.33*  --  1.33*    138 136 137   < > 141   < >  --    POTASSIUM 3.8 3.7 4.0  4.0 4.2   < >  4.2   < >  --    CHLORIDE 104 102 101 102   < > 106  --   --    CO2 22 24 23 26   < > 23  --   --    BUN 11.8 10.5 6.8 9.6   < > 16.4  --   --    CR 0.70 0.67 0.65* 0.65*   < > 0.89  --   --    ANIONGAP 11 12 12 9   < > 12  --   --    JOSE MARIA 8.9 8.6* 8.9 8.7*   < > 8.6*  --   --    * 116* 113* 114*   < > 155*   < >  --    ALBUMIN  --   --  3.8 3.9   < > 3.8  --   --    PROTTOTAL  --   --  6.8 6.4   < > 5.6*  --   --    BILITOTAL  --   --  0.8 0.7   < > 1.2  --   --    ALKPHOS  --   --  49 45   < > 45  --   --    ALT  --   --  22 21   < > 23  --   --    AST  --   --  35 50*   < > 74*  --   --     < > = values in this interval not displayed.      GLUCOSE:   Recent Labs   Lab 05/05/25  0535 05/04/25  0531 05/03/25  0647 05/02/25  0452 05/01/25  0842 05/01/25  0552   * 116* 113* 114* 123* 127*

## 2025-05-05 NOTE — PROGRESS NOTES
Pain Service Progress Note  [facility]  Date: 05/05/2025       Patient Name: Johnie Buenrostro  MRN: [id number]  Age: 45 year old  Sex: male      Assessment:  Johnie Buenrostro is a 45 year old male who has PMH of ADHD and mitral valve regurgitation and is now s/p minimally invasive mitral valve repair by Dr. Maxwell on 4/30/2025. He states an episode of breakthrough pain overnight after missing a dose while asleep, didn't understand the medication were PRN and needed to be requested.  It is 3/10 this morning.        Date of Catheter Placement: 4/30/25  Date of Catheter Removal: 05/05/25      Plan/Recommendations:  1. Regional Anesthesia/Analgesia  -Continuous Catheter Type/Site: right paravertebral (PV) T6-7  -4 hr Pause trial this AM successful without breakthrough pain   - catheter removed, tip intact     2. Anticoagulation  - okay to resume AC at this time  heparin ANTICOAGULANT injection 5,000 Units    5,000 Units, SC, Q8H         3. Multimodal Analgesia  - opioid naive at baseline  - per primary service: oxycodone 10mg and IV Dilaudid 0.4 mg     Pain Service will sign off at this time     Angel Dixon MD CA-1, PGY-2   05/05/2025      Overnight Events: No major acute event. Breakthrough pain as above    Tubes/Drains: Chest tubes removed 5/3    Subjective: Feels pain is manageable. Agrees with plan to remove catheters    Symptoms of LAST: No    Pain Location:  Right chest/right shoulder    Pain Intensity:    Pain at Rest: 3/10   Pain with Activity: 4/10    Satisfied with your level of pain control: Yes    Diet: Advance Diet as Tolerated: Regular Diet Adult    Relevant Labs:  Recent Labs   Lab Test 05/02/25  0452 04/30/25 2004 04/30/25  1310   PROTIME  --   --  16.4*   INR  --   --  1.33*      < > 170   PTT  --   --  27   BUN 9.6   < > 16.4    < > = values in this interval not displayed.       Physical Exam:  Vitals: /72 (BP Location: Right arm, Cuff Size: Adult Regular)   Pulse  "86   Temp 98  F (36.7  C) (Oral)   Resp 17   Ht 1.803 m (5' 11\")   Wt 84.1 kg (185 lb 8 oz)   SpO2 95%   BMI 25.87 kg/m      Physical Exam:   Orientation:  Alert, oriented, and in no acute distress: Yes  Sedation: No    Motor Examination:  5/5 Strength in lower extremities: Yes      Catheter Site:   Catheter entry site is clean/dry/intact: Yes, bandage applied post removal    Tender: No      Relevant Medications:  Current Pain Medications:  Medications related to Pain Management (From now, onward)      Start     Dose/Rate Route Frequency Ordered Stop    05/03/25 0000  bisacodyl (DULCOLAX) suppository 10 mg         10 mg Rectal DAILY PRN 04/30/25 1256      05/02/25 2200  gabapentin (NEURONTIN) capsule 400 mg         400 mg Oral AT BEDTIME 05/01/25 2230 05/02/25 0800  methocarbamol (ROBAXIN) tablet 1,000 mg         1,000 mg Oral 4 TIMES DAILY 05/01/25 2223      05/02/25 0800  gabapentin (NEURONTIN) capsule 200 mg         200 mg Oral 2 TIMES DAILY. 05/01/25 2225      05/02/25 0000  magnesium hydroxide (MILK OF MAGNESIA) suspension 30 mL         30 mL Oral DAILY PRN 04/30/25 1256      05/01/25 2000  senna-docusate (SENOKOT-S/PERICOLACE) 8.6-50 MG per tablet 2 tablet         2 tablet Oral 2 TIMES DAILY 05/01/25 1007      05/01/25 1300  ROPivacaine 0.2% in sodium chloride 0.9% PERINEURAL infusion          Perineural Continuous Nerve Block 05/01/25 1246      05/01/25 1009  hydrOXYzine HCl (ATARAX) tablet 25 mg        Placed in \"Or\" Linked Group    25 mg Oral EVERY 6 HOURS PRN 05/01/25 1016      05/01/25 1009  hydrOXYzine HCl (ATARAX) tablet 50 mg        Placed in \"Or\" Linked Group    50 mg Oral EVERY 6 HOURS PRN 05/01/25 1016      05/01/25 0824  oxyCODONE (ROXICODONE) tablet 5 mg        Placed in \"Or\" Linked Group    5 mg Oral EVERY 3 HOURS PRN 05/01/25 0824      05/01/25 0824  oxyCODONE IR (ROXICODONE) tablet 10 mg        Placed in \"Or\" Linked Group    10 mg Oral EVERY 3 HOURS PRN 05/01/25 0824      05/01/25 " "0800  polyethylene glycol (MIRALAX) Packet 17 g         17 g Oral DAILY 04/30/25 1256      05/01/25 0800  aspirin (ASA) chewable tablet 81 mg         81 mg Oral or NG Tube DAILY 04/30/25 1256      04/30/25 1300  acetaminophen (TYLENOL) tablet 975 mg         975 mg Oral EVERY 8 HOURS 04/30/25 1256      04/30/25 1256  HYDROmorphone (DILAUDID) injection 0.2 mg        Placed in \"Or\" Linked Group    0.2 mg Intravenous EVERY 2 HOURS PRN 04/30/25 1256      04/30/25 1256  HYDROmorphone (DILAUDID) injection 0.4 mg        Placed in \"Or\" Linked Group    0.4 mg Intravenous EVERY 2 HOURS PRN 04/30/25 1256      04/30/25 1256  lidocaine 1 % 0.1-1 mL         0.1-1 mL Other EVERY 1 HOUR PRN 04/30/25 1256      04/30/25 1256  lidocaine (LMX4) cream          Topical EVERY 1 HOUR PRN 04/30/25 1256              Primary Service Contacted with Recommendations? Yes        Acute Inpatient Pain Service [facility]  Hours of pain coverage 24/7   Please Page via Vocera  - Link to VocEfficient Power Conversion Here - Search Pain  Page via Amcom- Please Page the Pain Team Via Amcom: \"PAIN MANAGEMENT ACUTE INPATIENT/ [facility]\"            "

## 2025-05-05 NOTE — CONSULTS
"    [facility]    Cardiology Consult Note-Cardiology      Date of Admission:  4/30/2025  Consult Requested by: CT surgery  Reason for Consult:     AFIB RVR, ST-segment changes       Assessment & Plan: SL   Johnie Buenrostro is a 45 year old male admitted on 4/30/2025 for planned mitral valve repair.  He has overall been recovering well from surgery, however his EKG is notable for ST segment changes on 5/3 concern for pericarditis.  He has a coronary angiogram from 3//25 which showed normal coronary arteries, is unlikely he has developed ACS at this time. He meets criteria with CRP elevation (192) as well as EKG changes.  He currently does not describe any symptoms of chest pain, no pleuritic pain.  He is hemodynamically stable and appears well.  His episode of A-fib has since resolved, and it is not uncommon in this postop period related cardiac surgery.  He is currently in sinus rhythm.    #Pericarditis  #Post-op afib     - Start colchicine 0.6 mg twice daily for 12 weeks, as well as aspirin 650 mg 3 times daily for 2 weeks. Would repeat CRP at the end of 2 weeks.  If CRP has improved, would decrease aspirin dose by 250 mg every 2 weeks, checking a CRP at the end of each period to ensure continued resolution of inflammatory markers.  - Ziopatch on discharge  - Would continued amiodarone for a period of 1 month, further medical therapy pending results of ziopatch.     The patient's care was discussed with the Attending Physician, Dr. Garrison .      Eldon Ferrara MD  [facility]    Clinically Significant Risk Factors                              # Overweight: Estimated body mass index is 25.87 kg/m  as calculated from the following:    Height as of this encounter: 1.803 m (5' 11\").    Weight as of this encounter: 84.1 kg (185 lb 8 oz).      "           ______________________________________________________________________    Chief Complaint   EKG changes    History is obtained from the patient    History of Present Illness   Johnie Buenrostro is a 45 year old male with past medical history of severe MR status post minimally invasive mitral valve repair on 4/30.  Cardiology was consulted after EKG read STEMI.  On interview with patient last night and again by me today, patient denies current chest pain, palpitations.  He describes pain in his incision site along with some slight shortness of breath, however has not noted any worsening.  He was also noted to have A-fib with RVR last night, which resolved with amiodarone.  He does not describe any episodes of palpitations in the past.    Past Medical History    Past Medical History:   Diagnosis Date    ADHD (attention deficit hyperactivity disorder)     Epilepsy (H)     as child    Mitral valve regurgitation     severe    Myxomatous mitral valve        Past Surgical History   Past Surgical History:   Procedure Laterality Date    COLONOSCOPY      X 2    CV CORONARY ANGIOGRAM N/A 3/14/2025    Procedure: Coronary Angiogram;  Surgeon: Cain Juarez MD;  Location:  HEART CARDIAC CATH LAB    CV RIGHT HEART CATH MEASUREMENTS RECORDED N/A 3/14/2025    Procedure: Right Heart Catheterization;  Surgeon: Cain Juarez MD;  Location:  HEART CARDIAC CATH LAB    REPAIR VALVE MITRAL MINIMALLY INVASIVE N/A 4/30/2025    Procedure: RIGHT MINI-THORACOTOMY, ON CARDIOPULMONARY BYPASS, MINIMALLY INVASIVE MITRAL VALVE REPAIR (ANNULOPLASTY RING AMI CAT PHYSIO II 32MM), TRANESOPHAGEAL ECHOCARDIOGRAM (ARMANDO) BY ANESTHESIA.;  Surgeon: Debbie Maxwell MD;  Location:  OR    [place] teeth extraction      age 16       Medications   I have reviewed this patient's current medications  Medications Prior to Admission   Medication Sig Dispense Refill Last Dose/Taking    amphetamine-dextroamphetamine (ADDERALL XR) 10 MG  24 hr capsule Take 10 mg by mouth every morning.   4/26/2025    amphetamine-dextroamphetamine (ADDERALL) 15 MG tablet Take 0.5 tablets by mouth every morning.   4/29/2025 at  6:00 AM    cholecalciferol (VITAMIN D3) 125 mcg (5000 units) capsule Take 125 mcg by mouth every morning.   Past Month    naproxen sodium (ANAPROX) 220 MG tablet Take 220 mg by mouth 2 times daily as needed for moderate pain.   More than a month            Physical Exam   Vital Signs: Temp: 98.5  F (36.9  C) Temp src: Oral BP: 103/80 Pulse: 95   Resp: 16 SpO2: 95 % O2 Device: None (Room air)    Weight: 185 lbs 8 oz    Gen: well appearing, NAD  CV: RRR, no MRG, cap refill <2 seconds, no peripheral edema  Pulm: clear breath sounds throughout    Medical Decision Making             Data     I have personally reviewed the following data over the past 24 hrs:    8.0  \   12.1 (L)   / 199     137 104 11.8 /  104 (H)   3.8 22 0.70 \     Procal: N/A CRP: 192.00 (H) Lactic Acid: N/A         Imaging results reviewed over the past 24 hrs:   No results found for this or any previous visit (from the past 24 hours).    TTE 5/4/25  Interpretation Summary  s/p mitral valve repair with PTFE neochords to the flail P2 segment, closure  of clefts between P1/P2 and P2/P3, implantation of a 32 mm Justice Physio 2  annoloplasty ring 4/30/2025  Trace mitral insufficiency is present. No MS.     Global and regional left ventricular function is normal with an EF of 55-60%.  Left ventricular size is normal.  Left ventricular diastolic function is normal.  The right ventricle is normal size.  Global right ventricular function is normal.  Dilation of the inferior vena cava is present with abnormal respiratory  variation in diameter.  No pericardial effusion is present.     This study was compared with the study from 1/23/2025 .Interim mitral valve  repair for severe MR.    EKG 5/4/25    CORS+RHC 3/14/25  BP: 102/82 (88) mmHg    RA: 4 mmHg  RV: 32/8 mmHg  PA: 25/10 (18)  mmHg  W: 9 mmHg  PA sat: 73.8 %  [place] CO/CI: 7.33/3.52  Thermo CO/CI: 8.87/4.26  SVR: 916 dsc-5  PVR: 1.23 OVALLE  Right sided filling pressures are normal. Left sided filling pressures are normal. Normal PA pressures. Normal cardiac output level.   Diagnostic  Dominance: Right  Left Main   The vessel is moderate in size and is angiographically normal.      Left Anterior Descending   The vessel is moderate in size and is angiographically normal.      First Diagonal Branch   The vessel is moderate in size and is angiographically normal.      Second Diagonal Branch   The vessel is moderate in size and is angiographically normal.      Ramus Intermedius   The vessel is moderate in size and is angiographically normal.      Left Circumflex   The vessel is moderate in size and is angiographically normal.      First Obtuse Marginal Branch   The vessel is moderate in size and is angiographically normal.      Right Coronary Artery   The vessel is moderate in size and is angiographically normal.      Right Posterior Descending Artery   The vessel is small and is angiographically normal.      Right Posterior Atrioventricular Artery   The vessel is small and is angiographically normal.      First Right Posterolateral Branch   The vessel is small and is angiographically normal.      Second Right Posterolateral Branch   The vessel is small and is angiographically normal.

## 2025-05-06 ENCOUNTER — DOCUMENTATION ONLY (OUTPATIENT)
Dept: OTHER | Facility: CLINIC | Age: 45
End: 2025-05-06
Payer: COMMERCIAL

## 2025-05-06 ENCOUNTER — APPOINTMENT (OUTPATIENT)
Dept: GENERAL RADIOLOGY | Facility: CLINIC | Age: 45
End: 2025-05-06
Attending: PHYSICIAN ASSISTANT
Payer: COMMERCIAL

## 2025-05-06 LAB
ANION GAP SERPL CALCULATED.3IONS-SCNC: 12 MMOL/L (ref 7–15)
ATRIAL RATE - MUSE: 288 BPM
ATRIAL RATE - MUSE: 300 BPM
ATRIAL RATE - MUSE: 95 BPM
ATRIAL RATE - MUSE: 96 BPM
ATRIAL RATE - MUSE: NORMAL BPM
BUN SERPL-MCNC: 12.3 MG/DL (ref 6–20)
CALCIUM SERPL-MCNC: 9.2 MG/DL (ref 8.8–10.4)
CHLORIDE SERPL-SCNC: 104 MMOL/L (ref 98–107)
CREAT SERPL-MCNC: 0.65 MG/DL (ref 0.67–1.17)
CRP SERPL-MCNC: 114 MG/L
DIASTOLIC BLOOD PRESSURE - MUSE: NORMAL MMHG
EGFRCR SERPLBLD CKD-EPI 2021: >90 ML/MIN/1.73M2
ERYTHROCYTE [DISTWIDTH] IN BLOOD BY AUTOMATED COUNT: 12.7 % (ref 10–15)
GLUCOSE SERPL-MCNC: 103 MG/DL (ref 70–99)
HCO3 SERPL-SCNC: 22 MMOL/L (ref 22–29)
HCT VFR BLD AUTO: 37 % (ref 40–53)
HGB BLD-MCNC: 12.4 G/DL (ref 13.3–17.7)
INTERPRETATION ECG - MUSE: NORMAL
MAGNESIUM SERPL-MCNC: 2 MG/DL (ref 1.7–2.3)
MCH RBC QN AUTO: 30 PG (ref 26.5–33)
MCHC RBC AUTO-ENTMCNC: 33.5 G/DL (ref 31.5–36.5)
MCV RBC AUTO: 90 FL (ref 78–100)
P AXIS - MUSE: 255 DEGREES
P AXIS - MUSE: 31 DEGREES
P AXIS - MUSE: 42 DEGREES
P AXIS - MUSE: NORMAL DEGREES
P AXIS - MUSE: NORMAL DEGREES
PHOSPHATE SERPL-MCNC: 4.5 MG/DL (ref 2.5–4.5)
PLATELET # BLD AUTO: 244 10E3/UL (ref 150–450)
POTASSIUM SERPL-SCNC: 4.3 MMOL/L (ref 3.4–5.3)
PR INTERVAL - MUSE: 160 MS
PR INTERVAL - MUSE: 194 MS
PR INTERVAL - MUSE: NORMAL MS
QRS DURATION - MUSE: 66 MS
QRS DURATION - MUSE: 68 MS
QRS DURATION - MUSE: 70 MS
QRS DURATION - MUSE: 74 MS
QRS DURATION - MUSE: 82 MS
QT - MUSE: 276 MS
QT - MUSE: 290 MS
QT - MUSE: 328 MS
QT - MUSE: 332 MS
QT - MUSE: 348 MS
QTC - MUSE: 414 MS
QTC - MUSE: 437 MS
QTC - MUSE: 450 MS
QTC - MUSE: 453 MS
QTC - MUSE: 469 MS
R AXIS - MUSE: -10 DEGREES
R AXIS - MUSE: -4 DEGREES
R AXIS - MUSE: -9 DEGREES
R AXIS - MUSE: 1 DEGREES
R AXIS - MUSE: 2 DEGREES
RBC # BLD AUTO: 4.13 10E6/UL (ref 4.4–5.9)
SODIUM SERPL-SCNC: 138 MMOL/L (ref 135–145)
SYSTOLIC BLOOD PRESSURE - MUSE: NORMAL MMHG
T AXIS - MUSE: -12 DEGREES
T AXIS - MUSE: -21 DEGREES
T AXIS - MUSE: -22 DEGREES
T AXIS - MUSE: -5 DEGREES
T AXIS - MUSE: -5 DEGREES
VENTRICULAR RATE- MUSE: 120 BPM
VENTRICULAR RATE- MUSE: 147 BPM
VENTRICULAR RATE- MUSE: 160 BPM
VENTRICULAR RATE- MUSE: 95 BPM
VENTRICULAR RATE- MUSE: 96 BPM
WBC # BLD AUTO: 6.3 10E3/UL (ref 4–11)

## 2025-05-06 PROCEDURE — 86140 C-REACTIVE PROTEIN: CPT | Performed by: PHYSICIAN ASSISTANT

## 2025-05-06 PROCEDURE — 250N000013 HC RX MED GY IP 250 OP 250 PS 637: Performed by: STUDENT IN AN ORGANIZED HEALTH CARE EDUCATION/TRAINING PROGRAM

## 2025-05-06 PROCEDURE — 71045 X-RAY EXAM CHEST 1 VIEW: CPT

## 2025-05-06 PROCEDURE — 80048 BASIC METABOLIC PNL TOTAL CA: CPT | Performed by: STUDENT IN AN ORGANIZED HEALTH CARE EDUCATION/TRAINING PROGRAM

## 2025-05-06 PROCEDURE — 71045 X-RAY EXAM CHEST 1 VIEW: CPT | Mod: 26 | Performed by: RADIOLOGY

## 2025-05-06 PROCEDURE — 85027 COMPLETE CBC AUTOMATED: CPT

## 2025-05-06 PROCEDURE — 84100 ASSAY OF PHOSPHORUS: CPT | Performed by: SURGERY

## 2025-05-06 PROCEDURE — 250N000013 HC RX MED GY IP 250 OP 250 PS 637: Performed by: PHYSICIAN ASSISTANT

## 2025-05-06 PROCEDURE — 250N000011 HC RX IP 250 OP 636: Performed by: STUDENT IN AN ORGANIZED HEALTH CARE EDUCATION/TRAINING PROGRAM

## 2025-05-06 PROCEDURE — 83735 ASSAY OF MAGNESIUM: CPT | Performed by: SURGERY

## 2025-05-06 PROCEDURE — 36415 COLL VENOUS BLD VENIPUNCTURE: CPT | Performed by: STUDENT IN AN ORGANIZED HEALTH CARE EDUCATION/TRAINING PROGRAM

## 2025-05-06 PROCEDURE — 250N000013 HC RX MED GY IP 250 OP 250 PS 637

## 2025-05-06 PROCEDURE — 120N000005 HC R&B MS OVERFLOW UMMC

## 2025-05-06 PROCEDURE — 250N000013 HC RX MED GY IP 250 OP 250 PS 637: Performed by: SURGERY

## 2025-05-06 RX ORDER — METOPROLOL SUCCINATE 50 MG/1
50 TABLET, EXTENDED RELEASE ORAL DAILY
Status: DISCONTINUED | OUTPATIENT
Start: 2025-05-07 | End: 2025-05-07

## 2025-05-06 RX ORDER — METOPROLOL TARTRATE 25 MG/1
25 TABLET, FILM COATED ORAL 2 TIMES DAILY
Status: DISCONTINUED | OUTPATIENT
Start: 2025-05-06 | End: 2025-05-06

## 2025-05-06 RX ORDER — ACETAMINOPHEN 325 MG/1
975 TABLET ORAL EVERY 8 HOURS
Status: DISCONTINUED | OUTPATIENT
Start: 2025-05-06 | End: 2025-05-07 | Stop reason: HOSPADM

## 2025-05-06 RX ORDER — METOPROLOL SUCCINATE 25 MG/1
25 TABLET, EXTENDED RELEASE ORAL DAILY
Status: COMPLETED | OUTPATIENT
Start: 2025-05-06 | End: 2025-05-06

## 2025-05-06 RX ORDER — ROSUVASTATIN CALCIUM 10 MG/1
10 TABLET, COATED ORAL DAILY
Status: DISCONTINUED | OUTPATIENT
Start: 2025-05-06 | End: 2025-05-07 | Stop reason: HOSPADM

## 2025-05-06 RX ORDER — MAGNESIUM OXIDE 400 MG/1
400 TABLET ORAL EVERY 4 HOURS
Status: COMPLETED | OUTPATIENT
Start: 2025-05-06 | End: 2025-05-06

## 2025-05-06 RX ADMIN — Medication 10 MG: at 20:06

## 2025-05-06 RX ADMIN — APIXABAN 5 MG: 5 TABLET, FILM COATED ORAL at 20:06

## 2025-05-06 RX ADMIN — SENNOSIDES AND DOCUSATE SODIUM 2 TABLET: 50; 8.6 TABLET ORAL at 21:12

## 2025-05-06 RX ADMIN — OXYCODONE HYDROCHLORIDE 10 MG: 10 TABLET ORAL at 04:13

## 2025-05-06 RX ADMIN — OXYCODONE HYDROCHLORIDE 10 MG: 10 TABLET ORAL at 07:55

## 2025-05-06 RX ADMIN — HEPARIN SODIUM 5000 UNITS: 5000 INJECTION, SOLUTION INTRAVENOUS; SUBCUTANEOUS at 08:29

## 2025-05-06 RX ADMIN — Medication 12.5 MG: at 08:29

## 2025-05-06 RX ADMIN — METHOCARBAMOL 1000 MG: 500 TABLET ORAL at 16:45

## 2025-05-06 RX ADMIN — COLCHICINE 0.3 MG: 0.6 TABLET, FILM COATED ORAL at 08:34

## 2025-05-06 RX ADMIN — FUROSEMIDE 40 MG: 40 TABLET ORAL at 08:29

## 2025-05-06 RX ADMIN — Medication 12.5 MG: at 01:00

## 2025-05-06 RX ADMIN — AMIODARONE HYDROCHLORIDE 400 MG: 200 TABLET ORAL at 20:06

## 2025-05-06 RX ADMIN — OXYCODONE HYDROCHLORIDE 10 MG: 10 TABLET ORAL at 21:14

## 2025-05-06 RX ADMIN — HYDROXYZINE HYDROCHLORIDE 50 MG: 50 TABLET ORAL at 01:00

## 2025-05-06 RX ADMIN — ACETAMINOPHEN 975 MG: 325 TABLET, FILM COATED ORAL at 11:27

## 2025-05-06 RX ADMIN — ACETAMINOPHEN 975 MG: 325 TABLET, FILM COATED ORAL at 04:13

## 2025-05-06 RX ADMIN — GABAPENTIN 200 MG: 100 CAPSULE ORAL at 08:29

## 2025-05-06 RX ADMIN — OXYCODONE HYDROCHLORIDE 5 MG: 5 TABLET ORAL at 17:48

## 2025-05-06 RX ADMIN — GABAPENTIN 400 MG: 300 CAPSULE ORAL at 21:12

## 2025-05-06 RX ADMIN — MAGNESIUM OXIDE TAB 400 MG (241.3 MG ELEMENTAL MG) 400 MG: 400 (241.3 MG) TAB at 08:29

## 2025-05-06 RX ADMIN — METHOCARBAMOL 1000 MG: 500 TABLET ORAL at 13:15

## 2025-05-06 RX ADMIN — METHOCARBAMOL 1000 MG: 500 TABLET ORAL at 08:29

## 2025-05-06 RX ADMIN — AMIODARONE HYDROCHLORIDE 400 MG: 200 TABLET ORAL at 08:29

## 2025-05-06 RX ADMIN — ACETAMINOPHEN 975 MG: 325 TABLET ORAL at 21:11

## 2025-05-06 RX ADMIN — METHOCARBAMOL 1000 MG: 500 TABLET ORAL at 20:06

## 2025-05-06 RX ADMIN — OXYCODONE HYDROCHLORIDE 10 MG: 10 TABLET ORAL at 13:15

## 2025-05-06 RX ADMIN — POLYETHYLENE GLYCOL 3350 17 G: 17 POWDER, FOR SOLUTION ORAL at 08:11

## 2025-05-06 RX ADMIN — SENNOSIDES AND DOCUSATE SODIUM 2 TABLET: 50; 8.6 TABLET ORAL at 08:29

## 2025-05-06 RX ADMIN — METOPROLOL SUCCINATE 25 MG: 25 TABLET, EXTENDED RELEASE ORAL at 17:49

## 2025-05-06 RX ADMIN — ASPIRIN 81 MG CHEWABLE TABLET 81 MG: 81 TABLET CHEWABLE at 08:29

## 2025-05-06 RX ADMIN — MAGNESIUM OXIDE TAB 400 MG (241.3 MG ELEMENTAL MG) 400 MG: 400 (241.3 MG) TAB at 11:27

## 2025-05-06 RX ADMIN — APIXABAN 5 MG: 5 TABLET, FILM COATED ORAL at 11:27

## 2025-05-06 ASSESSMENT — ACTIVITIES OF DAILY LIVING (ADL)
ADLS_ACUITY_SCORE: 39
ADLS_ACUITY_SCORE: 37
ADLS_ACUITY_SCORE: 40
ADLS_ACUITY_SCORE: 40
ADLS_ACUITY_SCORE: 37
ADLS_ACUITY_SCORE: 40
ADLS_ACUITY_SCORE: 39
ADLS_ACUITY_SCORE: 40
ADLS_ACUITY_SCORE: 37
ADLS_ACUITY_SCORE: 40
ADLS_ACUITY_SCORE: 40
ADLS_ACUITY_SCORE: 39
ADLS_ACUITY_SCORE: 40
ADLS_ACUITY_SCORE: 39
ADLS_ACUITY_SCORE: 40
ADLS_ACUITY_SCORE: 37
ADLS_ACUITY_SCORE: 40

## 2025-05-06 NOTE — PROVIDER NOTIFICATION
D:patient went into a-flutter 130's, VSS, rates right chest incision pain a 3, BP stable  I:EKG, repositioned, pain meds, MD notified  A:pt resting in bed,MD in room  P:notify MD if a-flutter goes into 140's or symptomatic

## 2025-05-06 NOTE — CONSULTS
Patient has [facility] through [facility].    Xarelto/Eliquis: $25/mo.    Leslie James  Pharmacy Technician/Liaison, Discharge Pharmacy   [phone] (voice or text)  [email or web address]  Pharmacy test claims are estimates and may not reflect final costs.  Suggested alternatives aim to be cost-effective and may not be therapeutically equivalent as this consult is informational and does not constitute medical advice.  Clinical decisions should be made by qualified healthcare providers.

## 2025-05-06 NOTE — PROGRESS NOTES
CARE MANAGEMENT FOLLOW UP    Additional Information:   05/06:  CHW delegated by the SW, notarized the Health Care Directive. Pt was alert and oriented. Pt confirmed their Health Care Agents and signed.  A copy was forwarded to [facility] and original copy left with Pt, and a copy filed in Pt's hard chart .    Torin Barrios   Community Health Worker, 7A&7B [facility].  [place], [place]   P [phone]   Fax: [phone]  Email: [email or web address]

## 2025-05-06 NOTE — PLAN OF CARE
Hours of Care [id number]    Neuro: A&Ox4.   Cardiac: SR. VSS.   Respiratory: on RA.  GI/: Adequate urine output.   Diet/appetite: regular diet.   Activity:  Independent.  Pain: At acceptable level on current regimen.  PRN Oxy x2, Atarax x1, and scheduled tylenol  Skin: No new deficits noted.  LDA's: PIV  Protocols/Labs:    K+Mg+Phos    Plan: Continue with POC. Notify primary team with changes.    Goal Outcome Evaluation:      Plan of Care Reviewed With: patient    Overall Patient Progress: improvingOverall Patient Progress: improving    Outcome Evaluation: A&Ox4, VSS, Pt was on Aflutter upon report, self converted to SR at around 0100. Pain management

## 2025-05-06 NOTE — PROGRESS NOTES
"Brief Surgery Note    Paged about: Afib 137 for 15 minutes, EKG triggered Stemi alert    Patient awake at bedside, in no distress, afebrile, hemodynamically stable other than -138, satting well on room air.    Patient states he feels well, occasionally feels his heart begin to \"kick start\" but otherwise no complaints. Endorses palpitations, denies chest pain, pressure over left chest, numbness or tingling along left medial aspect of arm to distal right upper extremities. Further denies any pain radiating to shoulder/neck/back. Denies any dizziness, sweating, headache, SOB, fatigue, nausea, vomiting.    Cardiology fellow contacted, discussed EKG results and previous cardiology note from earlier in the day. Agreed EKG findings are likely from pericarditis and not a true acute coronary syndrome, especially in the setting of patient being asymptomatic. Current AFIB w/ RVR protocol in order set, state to activate protocol if HR > 150. Patients HR has not reached this threshold. Fellow mentions it is okay to give a 1x 5mg metoprolol dose, however might not make much of a difference and would be equally okay to just observe overnight given patients young age, being on amiodarone and beta blockers.    Physical Exam   -Gen: A&Ox4, NAD, normal pallor   -Lungs: Non-labored breathing on room air   -Chest: Irregular rate, Irregular rhythm, no murmur, rubs, clicks, or gallops, right chest and groin incision clean, dry, intact, no erythema, induration   -Abdomen: Soft, non-tender, non-distended   -Extremities: warm, well perfused    Plan   -Page if HR >150, Chest pain, SOB, oxygen desaturation, numbness or tingling along upper extremities.   -Will continue to monitor patient overnight   -Will follow Cardiology recommendations    Phillip Patel  Resident  Surgery [facility]  "

## 2025-05-06 NOTE — PROGRESS NOTES
Cardiovascular Surgery Progress Note  05/06/2025         Assessment and Plan:     Johnie Buenrostro is a 45 year old male with a PMH of ADHD, severe mitral valve regurgitation. Patient is now s/p right mini thoracotomy, mitral repair on 4/30/2025 with Dr. Maxwell. ICU course unremarkable, transferred to [facility] on postop day 2.    Cardiovascular:   Hx of severe mitral regurgitation 2/2 bileaflet prolapse s/p minimally invasive MV repair  Postop paroxysmal rapid afib/aflutter  Possible pericarditis  HD stable, went into afib again last night but was self limiting and converted out after ~15 minutes. HR rate controlled.  HR 80-130s, BP /68-82 and MAPs 78-95.   Most recent preop echo demonstrated LVEF 55-60%, severe mitral insufficiency  Postop echo 5/4 showed LVEF 55-60%, mitral valve s/p repair with trace mitral insufficiency, dilation of IVC with abnormal respiratory variation, no pericardial effusion  ASA 81 mg daily  Eliquis 5 mg BID  Statin is indicated based on labs at outside facility. Will start on Statin therapy 5/6 evening. (Total 249, non-, ) - Crestor 10mg daily  Amiodarone gtt -> now PO taper  Metoprolol 12.5 mg QID changed after 2 doses 5/6 to Toprol XL 25 for an evening dose and then will start Toprol XL 50mg 5/7 morning which should also help with AF breakthrough.   Diuresis as below   EKG 5/3 and 5/4 with relatively diffuse convex ST elevations, cardiology consulted, likely pericarditis. No chest pain. Recs:  Start Colchicine 0.6 mg BID x 12 weeks (reduced to 0.3 by pharm d/t amiodarone interactions)  ASA 650mg TID x 2 weeks. Repeat CRP at end of 2 weeks, if improved decrease ASA. (Going to start on DOAC for coverage instead 5/6 with recurrent Afib episodes)  Ziopatch on discharge  Continue Amio x 1 month, further med therapy will be pending Zio results.    Chest tubes and TPW: removed 5/3, follow up imaging stable.     Pulmonary:  Extubated POD 0. Now saturating well on RA  Supplemental  O2 PRN to keep sats > 92%.   Continue aggressive pulm hygiene, IS, activity and deep breathing  SOB 5/6  CXR ordered to eval    Neurology / MSK:  Acute post-operative pain  ADHD  Pain well controlled with current regimen  Scheduled: tylenol, gabapentin, robaxin  PRN: atarax, oxycodone  HOLD PTA adderall XR  Acute pain services was following and managing peripheral nerve catheters while chest tubes were in place, now s/o and PV cath removed.      / Renal / Fluid / Electrolytes:  BL creat ~ 0.8, most recent creatinine 0.65; adequate UOP.   FLUID STATUS: Pre-op weight 191 lbs, weight today 185; I/O past 24 hours: inaccurate  Diuresis: daily PO lasix for a few additional days per results of echo, small pleural effusions  Replete lytes per protocol  Strict I/O, daily weights  Avoid/limit nephrotoxins as able    GI / Nutrition:   Risk for protein calorie malnutrition  Hx rectal prolapse  Tolerating regular diet  Continue bowel regimen, last BM 5/5  Patient reports blood sometimes present when needing to strain for BM 2/2 rectal prolapse. Continue increased bowel reg, continue to monitor    Endocrine:  Stress induced hyperglycemia, resolved  Hgb A1c 5.1%  Initially managed on insulin drip postop, transitioned to sliding scale now discontinued due to no needs  Goal BG <180 for optimal healing    Infectious Disease:  Stress induced leukocytosis, resolved  WBC 6.3, remains afebrile, no signs or symptoms of infection  Completed perioperative antibiotics  Continue to monitor fever curve, CBC    Hematology:   Acute blood loss anemia, resolved  Hgb 12.4; Plt WNL, no signs or symptoms of active bleeding  CBC daily    Anticoagulation:   ASA 81 mg daily  D/w Dr. Maxwell, decision made for DOAC with recurrent AF. Pharm liaison consult, discussed with patient and able to afford. Eliquis 5 mg BID started 5/6.     MSK/Skin:  Right mini thoracotomy  Surgical incision  Sternal precautions  Incisional cares per protocol    Prophylaxis:  "  Stress ulcer prophylaxis: Pantoprazole 40 mg daily  DVT prophylaxis: Subcutaneous heparin, SCD       Clinically Significant Risk Factors                                # Overweight: Estimated body mass index is 25.87 kg/m  as calculated from the following:    Height as of this encounter: 1.803 m (5' 11\").    Weight as of this encounter: 84.1 kg (185 lb 8 oz).               Disposition:   Transferred to  on 5/2  Therapies recommending discharge to home with OP CR    Medically Ready for Discharge: Anticipated Tomorrow (5/7) pending heart rhythm stability and adequate pain control with PO agents only.    Dr. Maxwell has been informed of changes through written communication.      Carolina Herbert PA-C  Cardiothoracic Surgery  Pager: [phone]         Interval History:     Patient had another episode of AF overnight, rate controlled. Discussed with Dr. Hinton and decision to start DOAC made and started 5/6.   Still having pain, controlled with PO meds. Sleeping poorly due to pain. Adjusting tylenol schedule tonight at patient request to try to help.   Back in NSR this morning but ST at times. Adjustments being made to BB to help with break through tachy and AF.   Does have some SOB today per RN report but not reported to self. CXR ordered.   Working with therapies and progressing well.   Breathing stable on RA today during exam, continues to use IS throughout the day.   Denies CP, dizziness, syncope, palpitations, fever, chills, myalgias, and sternal popping/clicking.          Physical Exam:     Gen: A&Ox4, NAD, sitting in chair  Neuro: Intact with no focal deficits   CV: NSR/ST with HR 80-100s this morning (up to 130s with AF last night), normal S1 S2, no murmurs, rubs or gallops  Pulm: CTA, no wheezing or rhonchi, normal breathing on RA  Abd: nondistended, normal BS, soft, nontender  Ext: no peripheral edema  Incision: right chest incisions clean, dry, intact, no erythema. Right groin incision c/d/i, no " "erythema.  Tubes/drain sites: dressing clean and dry         Data:    /79 (BP Location: Right arm, Cuff Size: Adult Regular)   Pulse 102   Temp 98.1  F (36.7  C) (Oral)   Resp 18   Ht 1.803 m (5' 11\")   Wt 84.1 kg (185 lb 8 oz)   SpO2 98%   BMI 25.87 kg/m       I/O last 3 completed shifts:  In: 960 [P.O.:960]  Out: -     Imaging:  reviewed recent imaging, no acute concerns    No results found for this or any previous visit (from the past 24 hours).         Labs:  Recent Labs   Lab 05/06/25  0545 05/05/25  1537 05/05/25  0535 05/04/25  0531 05/03/25  0647 05/02/25  0452 04/30/25  1614 04/30/25  1310 04/30/25  1157 04/30/25  1151   WBC 6.3  --  8.0 9.2 9.6 10.7   < > 11.6*  --   --    HGB 12.4*  --  12.1* 12.6* 13.9 12.5*   < > 12.7*   < >  --    MCV 90  --  91 89 92 91   < > 89  --   --      --  199 167 165 155   < > 170  --  194   INR  --   --   --   --   --   --   --  1.33*  --  1.33*     --  137 138 136 137   < > 141   < >  --    POTASSIUM 4.3 4.1 3.8 3.7 4.0  4.0 4.2   < > 4.2   < >  --    CHLORIDE 104  --  104 102 101 102   < > 106  --   --    CO2 22  --  22 24 23 26   < > 23  --   --    BUN 12.3  --  11.8 10.5 6.8 9.6   < > 16.4  --   --    CR 0.65*  --  0.70 0.67 0.65* 0.65*   < > 0.89  --   --    ANIONGAP 12  --  11 12 12 9   < > 12  --   --    JOSE MARIA 9.2  --  8.9 8.6* 8.9 8.7*   < > 8.6*  --   --    *  --  104* 116* 113* 114*   < > 155*   < >  --    ALBUMIN  --   --   --   --  3.8 3.9   < > 3.8  --   --    PROTTOTAL  --   --   --   --  6.8 6.4   < > 5.6*  --   --    BILITOTAL  --   --   --   --  0.8 0.7   < > 1.2  --   --    ALKPHOS  --   --   --   --  49 45   < > 45  --   --    ALT  --   --   --   --  22 21   < > 23  --   --    AST  --   --   --   --  35 50*   < > 74*  --   --     < > = values in this interval not displayed.      GLUCOSE:   Recent Labs   Lab 05/06/25  0545 05/05/25  0535 05/04/25  0531 05/03/25  0647 05/02/25  0452 05/01/25  0842   * 104* 116* 113* 114* " 123*

## 2025-05-06 NOTE — PLAN OF CARE
"Goal Outcome Evaluation:      Plan of Care Reviewed With: patient, spouse    Overall Patient Progress: no changeOverall Patient Progress: no change    Outcome Evaluation: Patient VSS. No aflutter this shift. ST/SR. Pain managed. Encouraged patient to wean off narcotics today. Up ambulating outside of room with spouse. Patient had an episode of SOB while at rest and when trying to sleep. Notified provider. Continue to monitor. SOB self resolved. Known ST elevation due to pericarditis on colchicine      Problem: Adult Inpatient Plan of Care  Goal: Plan of Care Review  Description: The Plan of Care Review/Shift note should be completed every shift.  The Outcome Evaluation is a brief statement about your assessment that the patient is improving, declining, or no change.  This information will be displayed automatically on your shiftnote.  Outcome: Progressing  Flowsheets (Taken 5/6/2025 1444)  Outcome Evaluation: Patient VSS. No aflutter this shift. ST/SR. Pain managed. Encouraged patient to wean off narcotics today. Up ambulating outside of room with spouse. Patient had an episode of SOB while at rest and when trying to sleep. Notified provider. Continue to monitor. SOB self resolved. Known ST elevation due to pericarditis on colchicine  Plan of Care Reviewed With:   patient   spouse  Overall Patient Progress: no change  Goal: Patient-Specific Goal (Individualized)  Description: You can add care plan individualizations to a care plan. Examples of Individualization might be:  \"Parent requests to be called daily at 9am for status\", \"I have a hard time hearing out of my right ear\", or \"Do not touch me to wake me up as it startlesme\".  Outcome: Progressing  Goal: Absence of Hospital-Acquired Illness or Injury  Outcome: Progressing  Intervention: Identify and Manage Fall Risk  Recent Flowsheet Documentation  Taken 5/6/2025 1200 by Stephani Garcia, YANG  Safety Promotion/Fall Prevention: treat underlying cause  Taken 5/6/2025 0800 " by Stephani Garcia RN  Safety Promotion/Fall Prevention: treat underlying cause  Intervention: Prevent Skin Injury  Recent Flowsheet Documentation  Taken 5/6/2025 1200 by Stephani Garcia RN  Body Position: position changed independently  Taken 5/6/2025 0800 by Stephani Garcia RN  Body Position: position changed independently  Goal: Optimal Comfort and Wellbeing  Outcome: Progressing  Intervention: Monitor Pain and Promote Comfort  Recent Flowsheet Documentation  Taken 5/6/2025 0829 by Stephani Garcia RN  Pain Management Interventions: medication (see MAR)  Intervention: Provide Person-Centered Care  Recent Flowsheet Documentation  Taken 5/6/2025 1200 by Stephani Garcia RN  Trust Relationship/Rapport:   care explained   questions answered   choices provided   empathic listening provided   emotional support provided  Taken 5/6/2025 0800 by Stephani Garcia RN  Trust Relationship/Rapport:   care explained   questions answered   choices provided   empathic listening provided  Goal: Readiness for Transition of Care  Outcome: Progressing

## 2025-05-06 NOTE — PROGRESS NOTES
"Care Management Follow Up    Length of Stay (days): 6    Expected Discharge Date: 05/07/2025     Concerns to be Addressed:     new healthcare directive  Patient plan of care discussed at interdisciplinary rounds: Yes    Anticipated Discharge Disposition:  home  Anticipated Discharge Services:  n/a  Anticipated Discharge DME:  n/a    Patient/family educated on Medicare website which has current facility and service quality ratings:  n/a  Education Provided on the Discharge Plan:  n/a  Patient/Family in Agreement with the Plan:  n/a    Referrals Placed by CM/SW:  n/a  Private pay costs discussed: Not applicable    Discussed  Partnership in Safe Discharge Planning  document with patient/family: No     Handoff Completed: No, handoff not indicated or clinically appropriate    Additional Information:  Late morning, JIGNA met with pt and spouse Gretchen (Phone: [phone]) at bedside to check in on status of new HCD. Pt and spouse discussed that they \"might as well\" have it notarized and uploaded into the ot's chart. SW reviewed HCD and it was completed correctly.    JIGNA messaged a notary in Care Management department the pt's name and room number for her to stop by later.     Next Steps: n/a    ___________________    MITCHEL Wilks, LGSW  6C , covering beds 6401 to 6519  [facility]   Phone: [phone]  [address]ha [address]          "

## 2025-05-07 ENCOUNTER — ORDERS ONLY (AUTO-RELEASED) (OUTPATIENT)
Dept: CARDIOLOGY | Facility: CLINIC | Age: 45
End: 2025-05-07
Payer: COMMERCIAL

## 2025-05-07 ENCOUNTER — TELEPHONE (OUTPATIENT)
Dept: CARDIOLOGY | Facility: CLINIC | Age: 45
End: 2025-05-07
Payer: COMMERCIAL

## 2025-05-07 ENCOUNTER — APPOINTMENT (OUTPATIENT)
Dept: OCCUPATIONAL THERAPY | Facility: CLINIC | Age: 45
End: 2025-05-07
Attending: SURGERY
Payer: COMMERCIAL

## 2025-05-07 VITALS
TEMPERATURE: 97 F | DIASTOLIC BLOOD PRESSURE: 73 MMHG | BODY MASS INDEX: 25.72 KG/M2 | SYSTOLIC BLOOD PRESSURE: 105 MMHG | HEART RATE: 97 BPM | OXYGEN SATURATION: 97 % | RESPIRATION RATE: 21 BRPM | WEIGHT: 183.7 LBS | HEIGHT: 71 IN

## 2025-05-07 DIAGNOSIS — Z98.890 S/P MITRAL VALVE REPAIR: ICD-10-CM

## 2025-05-07 LAB
ANION GAP SERPL CALCULATED.3IONS-SCNC: 12 MMOL/L (ref 7–15)
ATRIAL RATE - MUSE: 274 BPM
BUN SERPL-MCNC: 13.2 MG/DL (ref 6–20)
CALCIUM SERPL-MCNC: 9.6 MG/DL (ref 8.8–10.4)
CHLORIDE SERPL-SCNC: 102 MMOL/L (ref 98–107)
CREAT SERPL-MCNC: 0.71 MG/DL (ref 0.67–1.17)
DIASTOLIC BLOOD PRESSURE - MUSE: NORMAL MMHG
EGFRCR SERPLBLD CKD-EPI 2021: >90 ML/MIN/1.73M2
ERYTHROCYTE [DISTWIDTH] IN BLOOD BY AUTOMATED COUNT: 12.6 % (ref 10–15)
GLUCOSE SERPL-MCNC: 98 MG/DL (ref 70–99)
HCO3 SERPL-SCNC: 23 MMOL/L (ref 22–29)
HCT VFR BLD AUTO: 39.8 % (ref 40–53)
HGB BLD-MCNC: 13.4 G/DL (ref 13.3–17.7)
INTERPRETATION ECG - MUSE: NORMAL
MAGNESIUM SERPL-MCNC: 2.2 MG/DL (ref 1.7–2.3)
MCH RBC QN AUTO: 30.7 PG (ref 26.5–33)
MCHC RBC AUTO-ENTMCNC: 33.7 G/DL (ref 31.5–36.5)
MCV RBC AUTO: 91 FL (ref 78–100)
P AXIS - MUSE: 248 DEGREES
PHOSPHATE SERPL-MCNC: 4.4 MG/DL (ref 2.5–4.5)
PLATELET # BLD AUTO: 297 10E3/UL (ref 150–450)
POTASSIUM SERPL-SCNC: 4.6 MMOL/L (ref 3.4–5.3)
PR INTERVAL - MUSE: NORMAL MS
QRS DURATION - MUSE: 70 MS
QT - MUSE: 308 MS
QTC - MUSE: 465 MS
R AXIS - MUSE: 4 DEGREES
RBC # BLD AUTO: 4.36 10E6/UL (ref 4.4–5.9)
SODIUM SERPL-SCNC: 137 MMOL/L (ref 135–145)
SYSTOLIC BLOOD PRESSURE - MUSE: NORMAL MMHG
T AXIS - MUSE: -17 DEGREES
VENTRICULAR RATE- MUSE: 137 BPM
WBC # BLD AUTO: 7.1 10E3/UL (ref 4–11)

## 2025-05-07 PROCEDURE — 83735 ASSAY OF MAGNESIUM: CPT | Performed by: SURGERY

## 2025-05-07 PROCEDURE — 97530 THERAPEUTIC ACTIVITIES: CPT | Mod: GO

## 2025-05-07 PROCEDURE — 97110 THERAPEUTIC EXERCISES: CPT | Mod: GO

## 2025-05-07 PROCEDURE — 250N000013 HC RX MED GY IP 250 OP 250 PS 637: Performed by: PHYSICIAN ASSISTANT

## 2025-05-07 PROCEDURE — 250N000013 HC RX MED GY IP 250 OP 250 PS 637

## 2025-05-07 PROCEDURE — 80048 BASIC METABOLIC PNL TOTAL CA: CPT | Performed by: STUDENT IN AN ORGANIZED HEALTH CARE EDUCATION/TRAINING PROGRAM

## 2025-05-07 PROCEDURE — 36415 COLL VENOUS BLD VENIPUNCTURE: CPT | Performed by: STUDENT IN AN ORGANIZED HEALTH CARE EDUCATION/TRAINING PROGRAM

## 2025-05-07 PROCEDURE — 97535 SELF CARE MNGMENT TRAINING: CPT | Mod: GO

## 2025-05-07 PROCEDURE — 250N000013 HC RX MED GY IP 250 OP 250 PS 637: Performed by: STUDENT IN AN ORGANIZED HEALTH CARE EDUCATION/TRAINING PROGRAM

## 2025-05-07 PROCEDURE — 84100 ASSAY OF PHOSPHORUS: CPT | Performed by: SURGERY

## 2025-05-07 PROCEDURE — 85027 COMPLETE CBC AUTOMATED: CPT

## 2025-05-07 RX ORDER — METOPROLOL SUCCINATE 25 MG/1
62.5 TABLET, EXTENDED RELEASE ORAL DAILY
Qty: 75 TABLET | Refills: 2 | Status: SHIPPED | OUTPATIENT
Start: 2025-05-08 | End: 2025-05-23

## 2025-05-07 RX ORDER — OXYCODONE HYDROCHLORIDE 5 MG/1
5-10 TABLET ORAL EVERY 4 HOURS PRN
Qty: 40 TABLET | Refills: 0 | Status: SHIPPED | OUTPATIENT
Start: 2025-05-07 | End: 2025-06-03

## 2025-05-07 RX ORDER — POLYETHYLENE GLYCOL 3350 17 G/17G
17 POWDER, FOR SOLUTION ORAL DAILY PRN
Qty: 510 G | Refills: 0 | Status: SHIPPED | OUTPATIENT
Start: 2025-05-07

## 2025-05-07 RX ORDER — AMIODARONE HYDROCHLORIDE 200 MG/1
TABLET ORAL
Qty: 55 TABLET | Refills: 0 | Status: SHIPPED | OUTPATIENT
Start: 2025-05-07 | End: 2025-06-03

## 2025-05-07 RX ORDER — ROSUVASTATIN CALCIUM 10 MG/1
10 TABLET, COATED ORAL DAILY
Qty: 30 TABLET | Refills: 2 | Status: SHIPPED | OUTPATIENT
Start: 2025-05-08

## 2025-05-07 RX ORDER — METHOCARBAMOL 1000 MG/1
1000 TABLET, FILM COATED ORAL 4 TIMES DAILY PRN
Qty: 40 TABLET | Refills: 1 | Status: SHIPPED | OUTPATIENT
Start: 2025-05-07

## 2025-05-07 RX ORDER — GABAPENTIN 300 MG/1
300 CAPSULE ORAL 3 TIMES DAILY PRN
Qty: 90 CAPSULE | Refills: 0 | Status: SHIPPED | OUTPATIENT
Start: 2025-05-07

## 2025-05-07 RX ORDER — PHENOL 1.4 %
10 AEROSOL, SPRAY (ML) MUCOUS MEMBRANE
Qty: 30 TABLET | Refills: 1 | Status: SHIPPED | OUTPATIENT
Start: 2025-05-07

## 2025-05-07 RX ORDER — AMOXICILLIN 250 MG
2 CAPSULE ORAL 2 TIMES DAILY PRN
Qty: 60 TABLET | Refills: 2 | Status: SHIPPED | OUTPATIENT
Start: 2025-05-07 | End: 2025-06-03

## 2025-05-07 RX ORDER — ASPIRIN 81 MG/1
81 TABLET, CHEWABLE ORAL DAILY
Qty: 30 TABLET | Refills: 2 | Status: SHIPPED | OUTPATIENT
Start: 2025-05-08

## 2025-05-07 RX ORDER — HYDROXYZINE HYDROCHLORIDE 25 MG/1
25 TABLET, FILM COATED ORAL EVERY 6 HOURS PRN
Qty: 40 TABLET | Refills: 1 | Status: SHIPPED | OUTPATIENT
Start: 2025-05-07

## 2025-05-07 RX ORDER — COLCHICINE 0.6 MG/1
0.3 TABLET ORAL DAILY
Qty: 40 TABLET | Refills: 0 | Status: SHIPPED | OUTPATIENT
Start: 2025-05-08 | End: 2025-06-03

## 2025-05-07 RX ORDER — ACETAMINOPHEN 325 MG/1
975 TABLET ORAL EVERY 8 HOURS PRN
Qty: 100 TABLET | Refills: 1 | Status: SHIPPED | OUTPATIENT
Start: 2025-05-07

## 2025-05-07 RX ADMIN — APIXABAN 5 MG: 5 TABLET, FILM COATED ORAL at 08:01

## 2025-05-07 RX ADMIN — GABAPENTIN 200 MG: 100 CAPSULE ORAL at 08:03

## 2025-05-07 RX ADMIN — METHOCARBAMOL 1000 MG: 500 TABLET ORAL at 08:01

## 2025-05-07 RX ADMIN — OXYCODONE HYDROCHLORIDE 10 MG: 10 TABLET ORAL at 00:17

## 2025-05-07 RX ADMIN — OXYCODONE HYDROCHLORIDE 5 MG: 5 TABLET ORAL at 13:06

## 2025-05-07 RX ADMIN — POLYETHYLENE GLYCOL 3350 17 G: 17 POWDER, FOR SOLUTION ORAL at 08:02

## 2025-05-07 RX ADMIN — METHOCARBAMOL 1000 MG: 500 TABLET ORAL at 11:53

## 2025-05-07 RX ADMIN — FUROSEMIDE 40 MG: 40 TABLET ORAL at 08:01

## 2025-05-07 RX ADMIN — Medication 12.5 MG: at 10:04

## 2025-05-07 RX ADMIN — ROSUVASTATIN CALCIUM 10 MG: 10 TABLET, FILM COATED ORAL at 08:01

## 2025-05-07 RX ADMIN — OXYCODONE HYDROCHLORIDE 10 MG: 10 TABLET ORAL at 03:52

## 2025-05-07 RX ADMIN — METOPROLOL SUCCINATE 50 MG: 50 TABLET, EXTENDED RELEASE ORAL at 08:01

## 2025-05-07 RX ADMIN — COLCHICINE 0.3 MG: 0.6 TABLET, FILM COATED ORAL at 08:01

## 2025-05-07 RX ADMIN — ACETAMINOPHEN 975 MG: 325 TABLET ORAL at 13:04

## 2025-05-07 RX ADMIN — AMIODARONE HYDROCHLORIDE 400 MG: 200 TABLET ORAL at 08:01

## 2025-05-07 RX ADMIN — ACETAMINOPHEN 975 MG: 325 TABLET ORAL at 06:15

## 2025-05-07 RX ADMIN — ASPIRIN 81 MG CHEWABLE TABLET 81 MG: 81 TABLET CHEWABLE at 08:01

## 2025-05-07 ASSESSMENT — ACTIVITIES OF DAILY LIVING (ADL)
ADLS_ACUITY_SCORE: 37

## 2025-05-07 NOTE — DISCHARGE INSTRUCTIONS
AFTER YOU GO HOME FROM YOUR HEART SURGERY  (S/p mini thoracotomy for mitral valve repair on 04/30/2025 with Dr. Maxwell)    You had a mini thoracotomy to complete your open heart surgery. We recommend no lifting over 10 pounds for 2-4 weeks.   No driving for 2 weeks after surgery or while on pain medication.     Shower or wash your incisions daily with soap and water (or as instructed), pat dry.   Keep wound clean and dry, showers are okay after discharge, but don't let spray hit directly on incision.   No baths or swimming for 1 month.   Cover chest tube sites with dry gauze until they stop draining, then leave open to air. It is not abnormal for chest tube sites to drain yellowish/clear fluid for up to 2-3 weeks after surgery.   Watch for signs of infection: increased redness, tenderness, warmth or any drainage that appears infected (pus like) or is persistent.  Also a temperature > 100.5 F or chills. Call your surgeon or primary care provider's office immediately.   Remove any skin glue left on incisions after 10-14 days. This will not affect your incision and can speed up healing.    Exercise is very important in your recovery. Please follow the guidelines set up for you in your cardiac rehab classes at the hospital. If outpatient cardiac rehab was ordered for you, we highly recommend you participate. If you have problems arranging your cardiac rehab, please call [phone] for all locations, with the exception of [place], please call [phone] and [place], please call [phone].    Avoid sitting for prolonged periods of time, try to walk every hour during the day. If you have a leg incision, elevate your leg often when you are not walking.    Check your weight when you get home from the hospital and continue to check it daily through your recovery for at least a month. If you notice a weight gain of 2-3 pounds in a week, notify your primary care physician, cardiologist or surgeon.    Bowel  activity may be slow after surgery. If necessary, you may take an over the counter laxative such as Milk of Magnesia or Miralax. You may have stool softeners prescribed (docusate sodium, Senokot). We recommend using stool softeners while using narcotics for pain (oxycodone/percocet, hydrocodone/vicodin, hydromorphone/dilaudid).      Wean OFF of narcotics (oxycodone, dilaudid, hydrocodone) as soon as possible. You should continue taking acetaminophen as long as you have any surgical pain as the first choice for pain control and add narcotics as necessary for pain to be tolerable.      DENTAL VISITS AFTER SURGERY  If you have had your heart valve repaired or replaced, we do not recommend having any dental work done for 6 months and you will need to take an antibiotic prior to dental visits from now on.  Please notify your dentist before any procedure for the proper treatment needed. The antibiotic is taken by mouth one hour prior to visit. This includes routine cleanings.    DO NOT SMOKE.  IF YOU NEED HELP QUITTING, PLEASE TALK WITH YOUR CARDIOLOGIST OR PRIMARY DOCTOR.    You are on a blood thinner (Eliquis for atrial fibrillation), follow the instructions you were given in the hospital and DO NOT SKIP this medication. Will need for minimum of 3 months, after this time can discuss with Cardiologist on continued need if no longer in atrial fibrillation.     REGARDING PRESCRIPTION REFILLS.  If you need a refill on your pain medication contact us to discuss your pain and a possible one time refill.   All other medications will be adjusted, discontinued and re-filled by your primary care physician and/or your cardiologist as they were prior to your surgery. We have given you enough for one to three month with possibly one refill.    POST-OPERATIVE CLINIC VISITS  You have a follow up visit with [facility] Advance Care Practitioners as scheduled.  You will then return to the care of your primary provider and your  cardiologist. Future medication refills should come from your primary care provider or Cardiologist.   You should see your primary care provider in 2-4 weeks after discharge.   It is important to see your cardiologist about 4-6 weeks after discharge.    If you do not hear from a  in 7 days, please call [phone] (choose option 1) and request to be seen with a general cardiologist or someone that you have seen in the past.   If there is a need to return to see CT Surgery please call our  at [phone].    SURGICAL QUESTIONS  Please call Zamzam Ayala, Taylor Valencia, Yessica Mendez or Sharda Youssef with surgical recovery and medication questions. They will assist you with your needs and contact other surgery care team members as indicated.    On weekends or after hours, please call [phone] and ask the  to page the Cardiothoracic Surgery fellow on call.      Thank you,    Your Cardiothoracic Surgery Team   Zamzam Ayala RN Care Coordinator - [phone]   Nalini Adkins RN Care Coordinator -  [phone]   Sharda Youssef, RN Care Coordinator - [phone]   Taylor Valencia, RN Care Coordinator - [phone]  Yessica Mendez RN Care Coordinator - [phone]

## 2025-05-07 NOTE — PLAN OF CARE
"Hours of Care: 3187-2041    Neuro: A&O x4, denied pain and dizziness.  Respiratory:  RA, lung sounds clear/diminished.  Cardiac: SR/ST w/ ST elevation. No edema.  GI: Denied nausea, bowel sounds audible.  : Had good urine output, see I&O flowsheet. Uses toilet.  Diet: Regular.  Skin: See PCS for assessment and treatment of wounds and surgical incisions.   VS: In SR/ST w/ ST elevation rhythm with HR 90s, -120, SaO2 >90% on RA.  Lines: L PIV SL.   Electrolytes: On mag, K, phos protocols. No electrolyte replacements needed.  Pain: Reported 9/10 pain, was given PRN 10mg oxycodone & scheduled tylenol.  Mobility: Ind.    Events: Patient reported 9/10 pain in L upper back. PRN oxycodone 10 mg x2 was given.    Plan:  Continue to monitor pain, VS, heart rhythm, fluid status, bowel status, cardiac and respiratory status.  Notify care team of changes in patient condition or other concerns.  Possible discharge to home today, will have ziopatch upon discharge. Encourage use of incentive spirometer.      Goal Outcome Evaluation:      Plan of Care Reviewed With: patient, spouse    Overall Patient Progress: no changeOverall Patient Progress: no change    Problem: Adult Inpatient Plan of Care  Goal: Plan of Care Review  Description: The Plan of Care Review/Shift note should be completed every shift.  The Outcome Evaluation is a brief statement about your assessment that the patient is improving, declining, or no change.  This information will be displayed automatically on your shiftnote.  Outcome: Progressing  Flowsheets (Taken 5/7/2025 0255)  Plan of Care Reviewed With:   patient   spouse  Overall Patient Progress: no change  Goal: Patient-Specific Goal (Individualized)  Description: You can add care plan individualizations to a care plan. Examples of Individualization might be:  \"Parent requests to be called daily at 9am for status\", \"I have a hard time hearing out of my right ear\", or \"Do not touch me to wake me up as it " "startlesme\".  Outcome: Progressing  Goal: Absence of Hospital-Acquired Illness or Injury  Outcome: Progressing  Intervention: Identify and Manage Fall Risk  Flowsheets  Taken 5/7/2025 0255  Safety Promotion/Fall Prevention:   activity supervised   clutter free environment maintained   increased rounding and observation   increase visualization of patient   lighting adjusted   nonskid shoes/slippers when out of bed   room near nurse's station   room organization consistent   safety round/check completed  Taken 5/7/2025 0017  Safety Promotion/Fall Prevention:   activity supervised   clutter free environment maintained   increased rounding and observation   increase visualization of patient   lighting adjusted   nonskid shoes/slippers when out of bed   room near nurse's station   room organization consistent   safety round/check completed  Intervention: Prevent Skin Injury  Flowsheets  Taken 5/7/2025 0255  Body Position: position changed independently  Skin Protection:   adhesive use limited   incontinence pads utilized  Taken 5/7/2025 0017  Body Position: position changed independently  Skin Protection:   adhesive use limited   incontinence pads utilized  Intervention: Prevent and Manage VTE (Venous Thromboembolism) Risk  Flowsheets  Taken 5/7/2025 0255  VTE Prevention/Management: SCDs off (sequential compression devices)  Taken 5/7/2025 0017  VTE Prevention/Management: SCDs off (sequential compression devices)  Intervention: Prevent Infection  Flowsheets  Taken 5/7/2025 0255  Infection Prevention:   cohorting utilized   equipment surfaces disinfected   hand hygiene promoted   personal protective equipment utilized   rest/sleep promoted  Taken 5/7/2025 0017  Infection Prevention:   cohorting utilized   equipment surfaces disinfected   hand hygiene promoted   personal protective equipment utilized   rest/sleep promoted  Goal: Optimal Comfort and Wellbeing  Outcome: Progressing  Intervention: Monitor Pain and Promote " Comfort  Flowsheets  Taken 5/7/2025 0255  Pain Management Interventions: medication (see MAR)  Taken 5/7/2025 0017  Pain Management Interventions: medication (see MAR)  Intervention: Provide Person-Centered Care  Flowsheets  Taken 5/7/2025 0255  Trust Relationship/Rapport:   care explained   choices provided  Taken 5/7/2025 0017  Trust Relationship/Rapport:   care explained   choices provided  Goal: Readiness for Transition of Care  Outcome: Progressing     Problem: Pain Acute  Goal: Optimal Pain Control and Function  Outcome: Progressing  Intervention: Optimize Psychosocial Wellbeing  Flowsheets  Taken 5/7/2025 0255  Supportive Measures:   active listening utilized   decision-making supported   self-care encouraged   relaxation techniques promoted  Taken 5/7/2025 0017  Supportive Measures:   active listening utilized   decision-making supported   self-care encouraged   relaxation techniques promoted  Intervention: Develop Pain Management Plan  Flowsheets  Taken 5/7/2025 0255  Pain Management Interventions: medication (see MAR)  Taken 5/7/2025 0017  Pain Management Interventions: medication (see MAR)  Intervention: Prevent or Manage Pain  Flowsheets  Taken 5/7/2025 0255  Sensory Stimulation Regulation:   care clustered   quiet environment promoted   auditory stimulation minimized   tactile stimulation minimized   visual stimulation minimized  Sleep/Rest Enhancement:   awakenings minimized   comfort measures   consistent schedule promoted   regular sleep/rest pattern promoted   relaxation techniques promoted  Bowel Elimination Promotion:   adequate fluid intake promoted   ambulation promoted  Medication Review/Management:   medications reviewed   high-risk medications identified  Taken 5/7/2025 0017  Sensory Stimulation Regulation:   care clustered   quiet environment promoted   auditory stimulation minimized   tactile stimulation minimized   visual stimulation minimized  Sleep/Rest Enhancement:   awakenings  minimized   comfort measures   consistent schedule promoted   regular sleep/rest pattern promoted   relaxation techniques promoted  Bowel Elimination Promotion:   adequate fluid intake promoted   ambulation promoted  Medication Review/Management:   medications reviewed   high-risk medications identified     Problem: Dysrhythmia  Goal: Normalized Cardiac Rhythm  Outcome: Progressing  Intervention: Monitor and Manage Cardiac Rhythm Effect  Flowsheets  Taken 5/7/2025 0255  VTE Prevention/Management: SCDs off (sequential compression devices)  Taken 5/7/2025 0017  VTE Prevention/Management: SCDs off (sequential compression devices)     Problem: Suicide Risk  Goal: Absence of Self-Harm  Outcome: Progressing  Intervention: Assess Risk to Self and Maintain Safety  Flowsheets  Taken 5/7/2025 0255  Enhanced Safety Measures:   pain management   review medications for side effects with activity   room near unit station  Taken 5/7/2025 0017  Enhanced Safety Measures:   pain management   review medications for side effects with activity   room near unit station  Intervention: Promote Psychosocial Wellbeing  Flowsheets  Taken 5/7/2025 0255  Supportive Measures:   active listening utilized   decision-making supported   self-care encouraged   relaxation techniques promoted  Sleep/Rest Enhancement:   awakenings minimized   comfort measures   consistent schedule promoted   regular sleep/rest pattern promoted   relaxation techniques promoted  Family/Support System Care:   presence promoted   self-care encouraged  Taken 5/7/2025 0017  Supportive Measures:   active listening utilized   decision-making supported   self-care encouraged   relaxation techniques promoted  Sleep/Rest Enhancement:   awakenings minimized   comfort measures   consistent schedule promoted   regular sleep/rest pattern promoted   relaxation techniques promoted  Family/Support System Care:   presence promoted   self-care encouraged     Problem: Cardiovascular  Surgery  Goal: Improved Activity Tolerance  Outcome: Progressing  Intervention: Optimize Tolerance for Activity  Flowsheets  Taken 5/7/2025 0255  Self-Care Promotion: independence encouraged  Taken 5/7/2025 0017  Self-Care Promotion: independence encouraged  Goal: Optimal Coping with Heart Surgery  Outcome: Progressing  Intervention: Support Psychosocial Response to Surgery  Flowsheets  Taken 5/7/2025 0255  Supportive Measures:   active listening utilized   decision-making supported   self-care encouraged   relaxation techniques promoted  Family/Support System Care:   presence promoted   self-care encouraged  Taken 5/7/2025 0017  Supportive Measures:   active listening utilized   decision-making supported   self-care encouraged   relaxation techniques promoted  Family/Support System Care:   presence promoted   self-care encouraged  Goal: Absence of Bleeding  Outcome: Progressing  Intervention: Monitor and Manage Bleeding  Flowsheets  Taken 5/7/2025 0255  Bleeding Management: dressing monitored  Taken 5/7/2025 0017  Bleeding Management: dressing monitored  Goal: Effective Bowel Elimination  Outcome: Progressing  Intervention: Enhance Bowel Motility and Elimination  Flowsheets  Taken 5/7/2025 0255  Bowel Motility Enhancement:   ambulation promoted   fluid intake encouraged  Taken 5/7/2025 0017  Bowel Motility Enhancement:   ambulation promoted   fluid intake encouraged  Goal: Effective Cardiac Function  Outcome: Progressing  Goal: Optimal Cerebral Tissue Perfusion  Outcome: Progressing  Intervention: Protect and Optimize Cerebral Perfusion  Flowsheets  Taken 5/7/2025 0255  Sensory Stimulation Regulation:   care clustered   quiet environment promoted   auditory stimulation minimized   tactile stimulation minimized   visual stimulation minimized  Glycemic Management: blood glucose monitored  Head of Bed (HOB) Positioning: HOB at 20-30 degrees  Taken 5/7/2025 0017  Sensory Stimulation Regulation:   care clustered   quiet  environment promoted   auditory stimulation minimized   tactile stimulation minimized   visual stimulation minimized  Head of Bed (HOB) Positioning: HOB at 20-30 degrees  Goal: Fluid and Electrolyte Balance  Outcome: Progressing  Goal: Blood Glucose Level Within Targeted Range  Outcome: Progressing  Intervention: Optimize Glycemic Control  Flowsheets (Taken 5/7/2025 0255)  Glycemic Management: blood glucose monitored  Goal: Absence of Infection Signs and Symptoms  Outcome: Progressing  Intervention: Prevent or Manage Infection  Flowsheets  Taken 5/7/2025 0255  Infection Prevention:   cohorting utilized   equipment surfaces disinfected   hand hygiene promoted   personal protective equipment utilized   rest/sleep promoted  Taken 5/7/2025 0017  Infection Prevention:   cohorting utilized   equipment surfaces disinfected   hand hygiene promoted   personal protective equipment utilized   rest/sleep promoted  Goal: Anesthesia/Sedation Recovery  Outcome: Progressing  Intervention: Optimize Anesthesia Recovery  Flowsheets  Taken 5/7/2025 0255  Safety Promotion/Fall Prevention:   activity supervised   clutter free environment maintained   increased rounding and observation   increase visualization of patient   lighting adjusted   nonskid shoes/slippers when out of bed   room near nurse's station   room organization consistent   safety round/check completed  Administration (IS): self-administered  Patient Tolerance (IS): good  Taken 5/7/2025 0017  Safety Promotion/Fall Prevention:   activity supervised   clutter free environment maintained   increased rounding and observation   increase visualization of patient   lighting adjusted   nonskid shoes/slippers when out of bed   room near nurse's station   room organization consistent   safety round/check completed  Goal: Acceptable Pain Control  Outcome: Progressing  Intervention: Prevent or Manage Pain  Flowsheets  Taken 5/7/2025 0255  Pain Management Interventions: medication  (see MAR)  Taken 5/7/2025 0017  Pain Management Interventions: medication (see MAR)  Goal: Nausea and Vomiting Relief  Outcome: Progressing  Goal: Effective Urinary Elimination  Outcome: Progressing  Goal: Effective Oxygenation and Ventilation  Outcome: Progressing  Intervention: Promote Airway Secretion Clearance  Flowsheets  Taken 5/7/2025 0255  Administration (IS): self-administered  Cough And Deep Breathing: done independently per patient  Patient Tolerance (IS): good  Taken 5/7/2025 0017  Cough And Deep Breathing: done independently per patient  Intervention: Optimize Oxygenation and Ventilation  Recent Flowsheet Documentation  Taken 5/7/2025 0017 by Laila Crawford, RN  Chest Tube Safety:   all connections secured   suction checked

## 2025-05-07 NOTE — PLAN OF CARE
Occupational Therapy Discharge Summary    Reason for therapy discharge:    Discharged to home.    Progress towards therapy goal(s). See goals on Care Plan in [facility] electronic health record for goal details.  Goals partially met.  Barriers to achieving goals:   discharge from facility.    Therapy recommendation(s):    Continued therapy is recommended.  Rationale/Recommendations:  OP CR to promote strength and activity tolerance for I/ADLs.

## 2025-05-07 NOTE — PROGRESS NOTES
SPIRITUAL HEALTH SERVICES - Progress Note  [facility] [address]    Referral Source/Reason for Visit: Saw pt Johnie Buenrostro per length of stay. Pt is [employer].    Summary: Pt is resting comfortably, spouse Gretchen is at the bedside. Pt shared context of admission. Provided prayer.    Plan of Care - Pt/Family oriented to [facility]. Johnie shared he is anticipating possible discharge.   ____________________________    Assessment    Patient/Family Understanding of Illness and Goals of Care -   Johnie shared his procedure has been in a planning process since January.  He noted his admission was several days more than anticipated but is feeling well and looking forward to discharge.      Distress and Loss - not discussed.    Strengths, Coping, and Resources - Johnie is well supported by family and marlys community.     Meaning, Beliefs, and Spirituality - Johnie is a  in a [employer] community.    The elder leadership of his Religion are providing spiritual care and have been supportive of his leave for necessary recovery.  Johnie and Gretchen welcomed a time of prayer.      Ishan Gant MA  Associate   [facility] remains available 24/7 for emergent requests/referrals, either by having the on-call  paged   or by entering an ASAP/STAT consult in Epic (this will also page the on-call ).   Routine Epic consults receive an initial response within 24 hours.

## 2025-05-07 NOTE — PROGRESS NOTES
DISCHARGE   Discharged to: Home  Via: Automobile  Accompanied by: Family  Discharge Instructions: diet, activity, medications, follow up appointments, when to call the MD, and what to watchout for (i.e. s/s of infection, increasing SOB, palpitations, chest pain,)  Reviewed with patient all discharge instructions in AVS. Reviewed anticoagulant medications and precautions. Reviewed sternal precautions. Reviewed opoid information  Prescriptions: To be filled by discharge pharmacy per pt's request; medication list reviewed & sent with pt  Follow Up Appointments: arranged; information given  Belongings: All sent with pt  IV: out  Telemetry: off  Pt exhibits understanding of above discharge instructions; all questions answered.  Discharge Paperwork: sent with patient    Patient frequency of interrmittent afib/aflutter controlled and reduced with AM metoprolol.

## 2025-05-07 NOTE — PLAN OF CARE
Johnie Buenrostro is a 45 year old male with a PMH of ADHD, severe mitral valve regurgitation. Patient is now s/p right mini thoracotomy, mitral repair on 4/30/2025 with Dr. Maxwell. ICU course unremarkable, transferred to [facility] on postop day 2.   Shift 15:00-23:30  Neuro: A&O x 4. Calm and pleasant   Resp: Lungs diminished.Sats high 90s on RA. Encouraged to use IS which he did well.  CV: ST 100s. VSS.   GI/: Eating and voiding well.  Skin: Several laparoscopic sites R chest that are healing well.  Pain: Pain has been an issue for him. Given oxycodone 5-10 mg po with moderate relief along with tylenol, robaxin, and gabapentin. Also using ice and hot packs.   Mobility: Up ambulating well in the room and halls.  Social: Wife at the bedside till 8:30 PM.  Plan: CVTS with issues. Assist as needed and medicate for pain. Encourage good pulmonary toilet and mobility.

## 2025-05-07 NOTE — DISCHARGE SUMMARY
[facility], [place]   Cardiothoracic Surgery Hospital Discharge Summary     Johnie Buenrostro MRN# [id number]   Age: 45 year old YOB: 1980     Admitting Physician:  Debbie Maxwell MD  Discharge Physician:  Carolina Herbert PA-C  Primary Care Physician:        Kaushik Sun     DATE OF ADMISSION: 4/30/2025      DATE OF DISCHARGE: May 7, 2025     Admit Wt: 191 lbs  Discharge Wt: 183 lbs          Primary Diagnoses:   Severe mitral valve regurgitation s/p mini thoracotomy mitral valve repair 04/30/25  Post-operative RVR Afib, improving and now rate controlled  Post-op pericarditis, will complete 12 week course per cards          Secondary Diagnoses:   Acute postoperative pain, improving  Acute blood loss anemia, stable  HTN  HLD  ADHD  Hx of rectal prolapse    PROCEDURES PERFORMED:   Date: 04/30/2025.  Surgeon: Dr. Maxwell  right mini-thoracotomy endoscopic-assisted mitral valve repair with PTFE neochords to the flail P2 segment, closure of clefts between P1/P2 and P2/P3, implantation of a 32 mm Justice Physio 2 annoloplasty ring, right common femoral arterial and venous cannulation for cardiopulmonary bypass, intraoperative ARMANDO     INTRAOPERATIVE FINDINGS:    The patient had an overall normal LV systolic size and function.  He had severe left atrial enlargement.  He had severe mitral valve regurgitation with multiple complex jets with some annular disjunction causing a pseudo anterior leaflet prolapse and an obvious flail P2 segment from multiple ruptured chordae.  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     PATHOLOGY RESULTS:    none     CULTURE RESULTS:    30-Day Micro Results       No results found for the last 720 hours.          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 Pre-operatively his ARMANDO demonstrated severe MR from bileaflet prolapse with some annular dysjunction and a very prominent flail P2 segment and a large cleft between the P1 and P2 segments. His LV function is preserved. He was referred to Dr aMxwell and his recommendation was a minimally invasive mitral valve repair. Risk vs benefits discussed and patient wished to proceed with the surgery as above.    .     HOSPITAL COURSE: Johnie Buenrostro is a 45 year old male who on 4/30/2025 underwent the above-named procedures and tolerated the operation well.     Immediately postoperatively the patient was admitted to the CVICU. Patient was extubated on POD #0. Blood pressure and cardiac index were managed with vasopressors and inotropic agents which were continuously weaned until no longer needed. Patient was subsequently transferred to the surgical telemetry floor on POD #2.    The patient continued to progress well. Chest tubes and temporary pacemaker wires were removed as appropriate.  Post-operatively he did go into an atrial fibrillation rhythm. He was started on an Amio bolus with gtt and then converted to PO. He has been having less and less episodes of AF daily and is now rate controlled when it does occur, but have been short and self sustained. Had short period this morning prior to medications, but converted back to NSR prior to discharge. Changes made to Metoprolol to place on Toprol Xl for less breakthrough and titrated up as well for more HR control at time of discharge. Discussed in detail when to call and when to present to ED if symptoms are to recur.   Eliquis was started prior to discharge since he continued to be in and out of the atrial fibrillation.     Cardiology was consulted during his stay as well for an EKG finding of diffuse ST elevation. Was deemed to have pericarditis and was started on Colchicine (reduced dose per pharm d/t amiodarone), started on  "BLAISE, sent with Dee at discharge, and to follow up with cardiology in 1 month for results and pending Zio may need to lengthen Amio duration.      The patient was fluid overloaded and treated with diuretics. Patient is down in weight from his pre-operative weight and has no further need for diuresis at discharge; will re-evaluate need in clinic follow-up. Patient also understands to continue to monitor weight daily and will call with weight gain.      Patient was transiently hyperglycemic and treated with insulin infusion then transitioned to sliding scale insulin per protocol. Blood sugars remained stable. No further glycemic control agents needed at this time.     Prior to discharge, his pain was controlled well, he was working well with therapies, able to perform most ADLs, ambulate without difficulty, and had full return of bowel and bladder function. On May 7, 2025, he was discharged to home in stable condition. Follow up with cardiology and cardiac surgery will be arranged with CVTS care coordinators. Pt encouraged to follow up with PCP and cardiac rehab upon discharge.    Patient discharged on aspirin:  Yes 81 mg  Patient discharged on beta blocker: yes Toprol XL   Patient discharged on ACE Inhibitor/ARB: no      Patient discharged on statin: yes   Patient discharged on anticoagulation: yes Eliquis for on/off rate controlled atrial fibrillation.          Discharge Disposition:     Discharged to home            Condition on Discharge:     Discharge condition: Stable   Discharge vitals: Blood pressure 105/75, pulse 101, temperature 98.1  F (36.7  C), temperature source Oral, resp. rate 18, height 1.803 m (5' 11\"), weight 83.3 kg (183 lb 11.2 oz), SpO2 97%.   Code status on discharge: Full Code     Vitals:    05/05/25 0700 05/06/25 0405 05/07/25 0618   Weight: 84.1 kg (185 lb 8 oz) 84.1 kg (185 lb 8 oz) 83.3 kg (183 lb 11.2 oz)       DAY OF DISCHARGE PHYSICAL EXAM:  Gen: A&Ox4, NAD  Neuro: no focal deficits "   CV: SR/ST with HR 90-100s, normal S1S2, no murmurs, rubs or gallops  Pulm: CTA bilaterally, no wheezing or rhonchi, unlabored work of breathing on RA  Abd: soft, non-tender, non-distended, BS present  Ext: no peripheral edema, no pitting  Incision: mini thoracotomy incision c/d/i and well-approximated, no erythema, sternum stable. Right groin site also reviewed from cannulation site for mini thoracotomy and c/d/I and well-approximated  Tubes/drain sites: dressing clean and dry without shadowing    LABS  Most Recent 3 CBC's:  Recent Labs   Lab Test 05/07/25  0610 05/06/25  0545 05/05/25  0535   WBC 7.1 6.3 8.0   HGB 13.4 12.4* 12.1*   MCV 91 90 91    244 199      Most Recent 3 BMP's:  Recent Labs   Lab Test 05/07/25  0610 05/06/25  0545 05/05/25  1537 05/05/25  0535    138  --  137   POTASSIUM 4.6 4.3 4.1 3.8   CHLORIDE 102 104  --  104   CO2 23 22  --  22   BUN 13.2 12.3  --  11.8   CR 0.71 0.65*  --  0.70   ANIONGAP 12 12  --  11   JOSE MARIA 9.6 9.2  --  8.9   GLC 98 103*  --  104*     Most Recent 2 LFT's:  Recent Labs   Lab Test 05/03/25  0647 05/02/25  0452   AST 35 50*   ALT 22 21   ALKPHOS 49 45   BILITOTAL 0.8 0.7     Most Recent INR's and Anticoagulation Dosing History:  Anticoagulation Dose History          Latest Ref Rng & Units 3/7/2025 3/14/2025 4/30/2025   Recent Dosing and Labs   INR 0.85 - 1.15 0.99  1.04  1.33  1.33       Details          Multiple values from one day are sorted in reverse-chronological order             Most Recent Cholesterol Panel:  Recent Labs   Lab Test 07/18/23  1035   CHOL 291*   *   HDL 49   TRIG 165*     Most Recent 6 Bacteria Isolates From Any Culture (See EPIC Reports for Culture Details):No lab results found.  Most Recent TSH, T4 and A1c Labs:  Recent Labs   Lab Test 03/07/25  0959   A1C 5.1      Recent Labs   Lab 05/07/25  0610 05/06/25  0545 05/05/25  0535 05/04/25  0531 05/03/25  0647 05/02/25  0452   GLC 98 103* 104* 116* 113* 114*        Imaging:  Results for orders placed or performed during the hospital encounter of 04/30/25   XR Chest Port 1 View    Narrative    Exam: XR CHEST PORT 1 VIEW, 4/30/2025 1:41 PM    Comparison: Cardiac CTA 3/31/2025    History: Post Op CVTS Surgery    Findings:  Endotracheal tube tip projects over the midthoracic trachea. Right IJ  Hubbard-Andres catheter tip projects over the proximal right pulmonary  artery. Enteric tube tip and sidehole project over the stomach. Two  right-sided chest tubes present. Cardiomediastinal silhouette is  mildly enlarged. Mitral valve prosthesis. Right azygos lobe. No  appreciable pneumothorax. Mild streaky right basilar opacities. No  pleural effusion.      Impression    Impression: Postoperative mitral valve repair.   1. Endotracheal tube tip projects over the mid thoracic trachea.  Additional support devices as described in the body of report.  2. Mild streaky right basilar atelectasis. No appreciable  pneumothorax.    I have personally reviewed the examination and initial interpretation  and I agree with the findings.    HUBER DEAN MD         SYSTEM ID:  [id number]   XR Chest Port 1 View    Narrative    EXAM: XR CHEST PORT 1 VIEW  5/1/2025 1:57 AM     HISTORY:  Post Op CVTS Surgery       COMPARISON:  4/30/2025    FINDINGS:     Interval removal of endotracheal and enteric tubes. Stable right IJ  Hubbard-Andres catheter, and chest tubes.    Trachea is midline. Cardiac valve prosthesis. Cardiomediastinal  silhouette and pulmonary vasculature are stable. Bibasilar streaky  pulmonary opacities, minimally increased compared to prior. No pleural  effusion. No discernible pneumothorax.    No acute osseous abnormality. Visualized upper abdomen is  unremarkable.        Impression    IMPRESSION:    1. Interval removal of endotracheal and enteric tubes with otherwise  stable support devices.  2. Mildly increased bibasilar streaky opacities, favoring atelectasis.    I have personally reviewed  the examination and initial interpretation  and I agree with the findings.    AMANDA ISBELL MD         SYSTEM ID:  [id number]   XR Abdomen 1 View    Narrative    EXAMINATION:  XR ABDOMEN 1 VIEW 4/30/2025 1:41 PM.    COMPARISON: None.    HISTORY:  OG in place    FINDINGS:   Enteric tube tip projects over the stomach, the sidehole projects near  the gastroesophageal junction. Nonobstructive bowel gas pattern.  Moderate colonic stool burden. No pneumatosis or portal venous gas. No  acute osseous abnormality. Please see same day chest radiograph for  dictation of thoracic findings.       Impression    IMPRESSION:   Enteric tube tip projects over the stomach, the sidehole projects near  the gastroesophageal junction. Consider advancement.    I have personally reviewed the examination and initial interpretation  and I agree with the findings.    LAWSON SAINZ DO         SYSTEM ID:  [id number]   XR Chest Port 1 View    Narrative    Exam: XR CHEST PORT 1 VIEW, 5/1/2025 5:27 PM    Comparison: Radiograph same day, 4/30/2025    History: Chest pain    Findings:  Right IJ Fairfield-Andres catheter has been removed with the sheath remaining  in place. Stable right-sided chest tubes. Cardiomediastinal silhouette  is stable. Cardiac valve prosthesis. Low lung volumes with slightly  increased streaky perihilar and bibasilar opacities. No substantial  pleural effusion, though the left costophrenic angle is not fully  included in the field-of-view. No appreciable pneumothorax. No acute  osseous abnormality. Visualized upper abdomen is unremarkable.      Impression    Impression:   1. Right IJ Fairfield-Andres catheter has been removed. Right chest tubes are  stable. No appreciable pneumothorax.  2. Low lung volumes with slightly increased streaky bibasilar  opacities, likely atelectasis and/or edema.     I have personally reviewed the examination and initial interpretation  and I agree with the findings.    COLE GRIGSBY MD         SYSTEM ID:   [id number]   XR Chest Port 1 View    Narrative    EXAM: XR CHEST PORT 1 VIEW  5/2/2025 2:03 AM     HISTORY:  s/p minimally invasive mitral valve repair       COMPARISON:  4/30/2025    FINDINGS:     Stable right IJ sheath and right-sided chest tubes.    Trachea is midline. Cardiac valve prosthesis. Cardiomediastinal  silhouette and pulmonary vasculature are stable. Essentially unchanged  bibasilar streaky pulmonary opacities. No pleural effusion. No  discernible pneumothorax.    No acute osseous abnormality. Visualized upper abdomen is  unremarkable.        Impression    IMPRESSION:  1. Stable support devices.  2. Essentially unchanged bibasilar opacities, may represent  atelectasis/edema.    I have personally reviewed the examination and initial interpretation  and I agree with the findings.    AMANDA ISBELL MD         SYSTEM ID:  [id number]   XR Chest Port 1 View    Narrative    Exam: XR CHEST PORT 1 VIEW, 5/3/2025 6:47 AM    Indication: new chest pain    Comparison: Chest x-ray 5/2/2025    Findings:   Portable AP radiograph the chest. Stable position of multiple chest  tubes in the right hemithorax. Postoperative changes of mitral valve  placement. Trachea is midline. The cardiac mediastinal silhouette and  pulmonary vasculature are stable. Streaky perihilar and bibasilar  opacities, similar to 5/2/2025. Normal lung volumes are symmetric and  diaphragms. No acute focal airspace opacity. No pneumothorax or  pleural effusion. Upper abdomen is unremarkable. No acute osseous or  soft tissue abnormality.      Impression    Impression:   1. Stable chest tubes within the right hemithorax.  2. Unchanged perihilar and bibasilar streaky opacities, favors  atelectasis/edema.     I have personally reviewed the examination and initial interpretation  and I agree with the findings.    ARBEN SWANSON MD         SYSTEM ID:  [id number]   XR Chest Port 1 View    Narrative    Exam: Chest 1 view portable 5/3/2025 11:07 AM     History:   chest tube removal    Comparison: same day at 06 38    Findings: Cardiac silhouette is enlarged. Mitral valve replacement.  Azygos fissure variant. Right chest tube is been removed. No  pneumothorax. Paravertebral anesthesia catheter on the right.  Bibasilar atelectasis. Interstitial opacities bilaterally.      Impression    IMPRESSION: No evidence of pneumothorax post right chest tube removal.    ARBEN SWANSON MD         SYSTEM ID:  [id number]   XR Chest 2 Views    Narrative    EXAM: XR CHEST 2 VIEWS 5/4/2025 8:19 AM    DEMOGRAPHICS: 45 years Male    INDICATION: Chest tube removal follow post minimally invasive mitral  repair    COMPARISON: Radiograph 5/3/2025    TECHNIQUE: Upright PA and lateral views of the chest.    FINDINGS:   Analgesia catheter overlying the right hemithorax.    The cardiomediastinal silhouette is stable. Continued perihilar and  bibasilar streaky opacities. Small left and trace right pleural  effusion. No definite pneumothorax. The visualized upper abdomen is  unremarkable. No acute osseous abnormality. Azygous lobe, normal  variant. Projection partially obscured with overlying upper  extremities      Impression    IMPRESSION:   1.  Similar perihilar and bibasilar streaky opacities, right more than  left, likely atelectasis and/or edema. Recommend follow-up to  resolution..  2.  Small left and trace right pleural effusions.    I have personally reviewed the examination and initial interpretation  and I agree with the findings.    AMANDA ISBELL MD         SYSTEM ID:  [id number]   XR Chest Port 1 View    Narrative    EXAM: XR CHEST PORT 1 VIEW 5/6/2025 4:08 PM    DEMOGRAPHICS: 45 years Male    INDICATION: SOB    COMPARISON: Chest x-ray 5/4/2025    TECHNIQUE: Single portable AP view of the chest.    FINDINGS:   The analgesia catheter previously overlying the right hemithorax is  absent.    Trachea is midline. Stable cardiomediastinal silhouette. Prosthetic  mitral valve.. Azygous lobe, normal  anatomic variant. Distinct  pulmonary vasculature. Small left pleural effusion, unchanged. No  discernable pneumothorax. Streaky perihilar and bibasilar airspace  opacities. Unremarkable upper abdomen. No acute or suspicious osseous  abnormalities. Unremarkable soft tissues.      Impression    IMPRESSION:   1.  Persistent perihilar and bibasilar streaky airspace opacities  continue to favor atelectasis versus edema.  2.  Persistent small left pleural effusion.    I have personally reviewed the examination and initial interpretation  and I agree with the findings.    HUBER DEAN MD         SYSTEM ID:  [id number]   Echo Complete     Value    LVEF  55-60%    Narrative    [id number]  [id number]  [id number]  [id number]^DONALD^ANDREW     [facility],[place]  Echocardiography Laboratory  [address] [place] [id number]     Name: BATSHEVA VERDUZCO  MRN: [id number]  : 1980  Study Date: 2025 09:51 AM  Age: 45 yrs  Gender: Male  Patient [place] [facility] [place]  Reason For Study: Mitral Valve Repair  Ordering Physician: ANDREW BENNETT  Performed By: Verena Jackson     BSA: 2.0 m2  Height: 71 in  Weight: 185 lb  HR: 93  BP: 105/62 mmHg  ______________________________________________________________________________  Procedure  Echocardiogram with two-dimensional, color and spectral Doppler.  ______________________________________________________________________________  Interpretation Summary  s/p mitral valve repair with PTFE neochords to the flail P2 segment, closure  of clefts between P1/P2 and P2/P3, implantation of a 32 mm Justice Physio 2  annoloplasty ring 2025  Trace mitral insufficiency is present. No MS.     Global and regional left ventricular function is normal with an EF of 55-60%.  Left ventricular size is normal.  Left ventricular diastolic function is normal.  The right ventricle is normal size.  Global right ventricular function is normal.  Dilation of the inferior vena  cava is present with abnormal respiratory  variation in diameter.  No pericardial effusion is present.     This study was compared with the study from 1/23/2025 .Interim mitral valve  repair for severe MR.  ______________________________________________________________________________  Left Ventricle  Left ventricular size is normal. Global and regional left ventricular function  is normal with an EF of 55-60%. Left ventricular wall thickness is normal.  Left ventricular diastolic function is normal.     Right Ventricle  The right ventricle is normal size. Global right ventricular function is  normal.     Atria  Both atria appear normal.     Mitral Valve  mitral valve repair with PTFE neochords to the flail P2 segment, closure of  clefts between P1/P2 and P2/P3, implantation of a 32 mm Justice Physio 2  annoloplasty ring. Trace mitral insufficiency is present.     Aortic Valve  Aortic valve is normal in structure and function.     Tricuspid Valve  The tricuspid valve is normal. The peak velocity of the tricuspid regurgitant  jet is not obtainable. Pulmonary artery systolic pressure cannot be assessed.     Pulmonic Valve  The pulmonic valve is normal.     Vessels  The aorta root is normal. Dilation of the inferior vena cava is present with  abnormal respiratory variation in diameter.     Pericardium  No pericardial effusion is present.     Compared to Previous Study  This study was compared with the study from 1/23/2025 . Interim mitral valve  repair for severe MR.  ______________________________________________________________________________  MMode/2D Measurements & Calculations     IVSd: 0.99 cm  LVIDd: 4.8 cm  LVIDs: 3.4 cm  LVPWd: 0.77 cm  FS: 28.9 %  LV mass(C)d: 142.7 grams  LV mass(C)dI: 70.0 grams/m2  Ao root diam: 3.2 cm  asc Aorta Diam: 3.5 cm  LVOT diam: 2.1 cm  LVOT area: 3.4 cm2  Ao root diam index Ht(cm/m): 1.8  Ao root diam index BSA (cm/m2): 1.6  Asc Ao diam index BSA (cm/m2): 1.7  Asc Ao diam index  Ht(cm/m): 1.9  RWT: 0.32     Doppler Measurements & Calculations  MV E max murali: 108.0 cm/sec  MV A max murali: 73.3 cm/sec  MV E/A: 1.5  MV max P.2 mmHg  MV mean PG: 3.1 mmHg  MV V2 VTI: 33.5 cm     MV P1/2t max murali: 124.7 cm/sec  MV P1/2t: 85.3 msec  MVA(P1/2t): 2.6 cm2  MV dec slope: 428.1 cm/sec2  MV dec time: 0.22 sec  Ao V2 max: 174.0 cm/sec  Ao max P.1 mmHg  Ao V2 mean: 107.3 cm/sec  Ao mean P.7 mmHg  Ao V2 VTI: 24.1 cm  PA V2 max: 95.4 cm/sec  PA max PG: 3.6 mmHg  PA acc time: 0.10 sec  E/E' av.9  Lateral E/e': 11.7  Medial E/e': 14.0     ______________________________________________________________________________  Report approved by: GORDON VELARDE MD on 2025 01:21 PM              PRE-ADMISSION MEDICATIONS:  Medications Prior to Admission   Medication Sig Dispense Refill Last Dose/Taking    cholecalciferol (VITAMIN D3) 125 mcg (5000 units) capsule Take 125 mcg by mouth every morning.   Past Month    [DISCONTINUED] amphetamine-dextroamphetamine (ADDERALL XR) 10 MG 24 hr capsule Take 10 mg by mouth every morning.   2025    [DISCONTINUED] amphetamine-dextroamphetamine (ADDERALL) 15 MG tablet Take 0.5 tablets by mouth every morning.   2025 at  6:00 AM    [DISCONTINUED] naproxen sodium (ANAPROX) 220 MG tablet Take 220 mg by mouth 2 times daily as needed for moderate pain.   More than a month       DISCHARGE MEDICATIONS:   Current Discharge Medication List        START taking these medications    Details   acetaminophen (TYLENOL) 325 MG tablet Take 3 tablets (975 mg) by mouth every 8 hours as needed for mild pain, fever or headaches.  Qty: 100 tablet, Refills: 1    Associated Diagnoses: S/P mitral valve repair      amiodarone (PACERONE) 200 MG tablet Take 2 tablets (400 mg) by mouth 2 times daily for 5 days, THEN 1 tablet (200 mg) 2 times daily for 7 days, THEN 1 tablet (200 mg) daily for 21 days.  Qty: 55 tablet, Refills: 0    Associated Diagnoses: S/P mitral valve repair       apixaban ANTICOAGULANT (ELIQUIS) 5 MG tablet Take 1 tablet (5 mg) by mouth 2 times daily.  Qty: 60 tablet, Refills: 1    Associated Diagnoses: S/P mitral valve repair      aspirin (ASA) 81 MG chewable tablet Take 1 tablet (81 mg) by mouth daily.  Qty: 30 tablet, Refills: 2    Associated Diagnoses: S/P mitral valve repair      colchicine (COLCRYS) 0.6 MG tablet Take 0.5 tablets (0.3 mg) by mouth daily.  Qty: 40 tablet, Refills: 0    Associated Diagnoses: S/P mitral valve repair      gabapentin (NEURONTIN) 300 MG capsule Take 1 capsule (300 mg) by mouth 3 times daily as needed for neuropathic pain.  Qty: 90 capsule, Refills: 0    Associated Diagnoses: S/P mitral valve repair      hydrOXYzine HCl (ATARAX) 25 MG tablet Take 1 tablet (25 mg) by mouth every 6 hours as needed for other, itching or anxiety (adjuvant pain).  Qty: 40 tablet, Refills: 1    Associated Diagnoses: S/P mitral valve repair      melatonin 10 MG TABS tablet Take 1 tablet (10 mg) by mouth nightly as needed for sleep.  Qty: 30 tablet, Refills: 1    Associated Diagnoses: S/P mitral valve repair      methocarbamol 1000 MG TABS Take 1,000 mg by mouth 4 times daily as needed for muscle spasms.  Qty: 40 tablet, Refills: 1    Associated Diagnoses: S/P mitral valve repair      metoprolol succinate ER (TOPROL XL) 25 MG 24 hr tablet Take 2.5 tablets (62.5 mg) by mouth daily.  Qty: 75 tablet, Refills: 2    Associated Diagnoses: S/P mitral valve repair      oxyCODONE (ROXICODONE) 5 MG tablet Take 1-2 tablets (5-10 mg) by mouth every 4 hours as needed for breakthrough pain or severe pain (Take 1 tab for pain 4-6, take 2 tabs for pain 7-10.).  Qty: 40 tablet, Refills: 0    Associated Diagnoses: S/P mitral valve repair      polyethylene glycol (MIRALAX) 17 GM/Dose powder Take 17 g by mouth daily as needed for constipation.  Qty: 510 g, Refills: 0    Associated Diagnoses: S/P mitral valve repair      rosuvastatin (CRESTOR) 10 MG tablet Take 1 tablet (10 mg) by  mouth daily.  Qty: 30 tablet, Refills: 2    Associated Diagnoses: S/P mitral valve repair      senna-docusate (SENOKOT-S/PERICOLACE) 8.6-50 MG tablet Take 2 tablets by mouth 2 times daily as needed for constipation.  Qty: 60 tablet, Refills: 2    Associated Diagnoses: S/P mitral valve repair           CONTINUE these medications which have NOT CHANGED    Details   cholecalciferol (VITAMIN D3) 125 mcg (5000 units) capsule Take 125 mcg by mouth every morning.           STOP taking these medications       amphetamine-dextroamphetamine (ADDERALL XR) 10 MG 24 hr capsule Comments:   Reason for Stopping:         amphetamine-dextroamphetamine (ADDERALL) 15 MG tablet Comments:   Reason for Stopping:         naproxen sodium (ANAPROX) 220 MG tablet Comments:   Reason for Stopping:                CC:Kaushik Perez,  Carolina Herbert PA-C     [facility]   Cardiothoracic Surgery  Office phone: [phone]  Office fax: [phone]

## 2025-05-08 ENCOUNTER — TELEPHONE (OUTPATIENT)
Dept: CARDIOLOGY | Facility: CLINIC | Age: 45
End: 2025-05-08
Payer: COMMERCIAL

## 2025-05-08 NOTE — TELEPHONE ENCOUNTER
CARDIOTHORACIC SURGERY  Discharge Follow Up Phone Call    POST OP MONITORING  How are you managing your pain? Pain is being managed well with tylenol and oxycodone as needed.    ACTIVITY  How is your activity tolerance? Up and walking well.  Are you still doing sternal precautions? NA  Do you hear any clicking when you are moving or taking a deep breath? NA    Are you weighing yourself daily? Weight has been stable.    SIGNS AND SYMPTOMS OF INFECTION  INCREASE IN PAIN - No  FEVER - No  DRAINAGE - No If so, color: NA  REDNESS - No  SWELLING - No    ASSISTANCE  Do you have someone at home to assist you with your daily activities? Spouse.    MEDICATIONS  Is someone helping you to set up your medications? Spouse is managing.  Do you have any questions about your medications? No.    Are you on a blood thinner? Eliquis.  Who is managing your INRs? NA.    FOLLOW UP  You are scheduled to see your primary care physician on - not yet scheduled.  You are scheduled to see our surgery advanced practive provider for post operative follow up on 5/23.    You are scheduled for cardiac rehab on - not yet scheduled.  You are scheduled to see your cardiologist on 6/12.    CONTACT INFORMATION  Please feel free to call us with any questions or symptoms that are concerning for you at [phone]. If it is after 4:00 in the afternoon, or a weekend please call [phone] and ask for the on call specialist.  We want to do everything we can to help prevent you needing to return to the ED, so please do not hesitate to call us.    Sharda Youssef, RNCC  Cardiothoracic Surgery  [phone]  May 8, 2025 8:26 AM

## 2025-05-08 NOTE — TELEPHONE ENCOUNTER
[facility] Call Center    Phone Message    May a detailed message be left on voicemail: yes     Reason for Call: Other: Pt is wife is requesting a call back to reschedule 5/27/25 Post-op     Action Taken: Other: cardio    Travel Screening: Not Applicable    Thank you!  [facility]       Date of Service:

## 2025-05-20 ENCOUNTER — HOSPITAL ENCOUNTER (OUTPATIENT)
Dept: CARDIAC REHAB | Facility: HOSPITAL | Age: 45
Discharge: HOME | End: 2025-05-20
Attending: INTERNAL MEDICINE
Payer: COMMERCIAL

## 2025-05-20 PROCEDURE — 93798 PHYS/QHP OP CAR RHAB W/ECG: CPT

## 2025-05-31 LAB — CV ZIO PRELIM RESULTS: NORMAL

## 2025-06-03 ENCOUNTER — OFFICE VISIT (OUTPATIENT)
Dept: CARDIOLOGY | Facility: CLINIC | Age: 45
End: 2025-06-03
Attending: NURSE PRACTITIONER
Payer: COMMERCIAL

## 2025-06-03 VITALS
BODY MASS INDEX: 26.92 KG/M2 | SYSTOLIC BLOOD PRESSURE: 103 MMHG | OXYGEN SATURATION: 96 % | DIASTOLIC BLOOD PRESSURE: 72 MMHG | HEART RATE: 88 BPM | WEIGHT: 193 LBS

## 2025-06-03 DIAGNOSIS — I34.1 MITRAL VALVE PROLAPSE: Primary | ICD-10-CM

## 2025-06-03 DIAGNOSIS — I34.0 NONRHEUMATIC MITRAL VALVE REGURGITATION: ICD-10-CM

## 2025-06-03 DIAGNOSIS — Z98.890 S/P MITRAL VALVE REPAIR: ICD-10-CM

## 2025-06-03 PROCEDURE — G0463 HOSPITAL OUTPT CLINIC VISIT: HCPCS | Performed by: NURSE PRACTITIONER

## 2025-06-03 ASSESSMENT — PAIN SCALES - GENERAL: PAINLEVEL_OUTOF10: NO PAIN (0)

## 2025-06-03 NOTE — PROGRESS NOTES
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     In breif review, patient underwent minimally invasive mitral valve repair with Dr. Maxwell on 4/30/25 with course c/b post op afib and Pericarditis. Post procedurally he has done quite well. He was most recently seen by Becky Carrera on 5/23/25 for post op follow up and was doing well. He was given Keflex which he completed for groin incisional site concerns.     Since then, he is feeling quite well. He continues to go to cardiac rehab and stay active. He recently has been walking almost 10-12K steps daily without symptoms. His Zio patch was reviewed without any arhythmias. He denies any palpitations, chest pains, lightheadedness, SOB, LE swelling or weight gain. BP well controlled. No other concerns voiced.        Current Outpatient Medications   Medication Sig Dispense Refill    acetaminophen (TYLENOL) 325 MG tablet Take 3 tablets (975 mg) by mouth every 8 hours as needed for mild pain, fever or headaches. 100 tablet 1    amiodarone (PACERONE) 200 MG tablet Take 2 tablets (400 mg) by mouth 2 times daily for 5 days, THEN 1 tablet (200 mg) 2 times daily for 7 days, THEN 1 tablet (200 mg) daily for 21 days. 55 tablet 0    apixaban ANTICOAGULANT (ELIQUIS) 5 MG tablet Take 1 tablet (5 mg) by mouth 2 times daily. 60 tablet 1    aspirin (ASA) 81 MG chewable tablet Take 1 tablet (81 mg) by mouth daily. 30 tablet 2    cholecalciferol (VITAMIN D3) 125 mcg (5000 units) capsule Take 125 mcg by mouth every morning.      colchicine (COLCRYS) 0.6 MG tablet Take 0.5 tablets (0.3 mg) by mouth daily. 40 tablet 0    gabapentin (NEURONTIN) 300 MG capsule Take 1 capsule (300 mg) by mouth 3 times daily as needed for  neuropathic pain. 90 capsule 0    hydrOXYzine HCl (ATARAX) 25 MG tablet Take 1 tablet (25 mg) by mouth every 6 hours as needed for other, itching or anxiety (adjuvant pain). (Patient not taking: Reported on 5/23/2025) 40 tablet 1    melatonin 10 MG TABS tablet Take 1 tablet (10 mg) by mouth nightly as needed for sleep. 30 tablet 1    methocarbamol 1000 MG TABS Take 1,000 mg by mouth 4 times daily as needed for muscle spasms. 40 tablet 1    metoprolol succinate ER (TOPROL XL) 25 MG 24 hr tablet Take 2.5 tablets (62.5 mg) by mouth daily. 75 tablet 2    oxyCODONE (ROXICODONE) 5 MG tablet Take 1-2 tablets (5-10 mg) by mouth every 4 hours as needed for breakthrough pain or severe pain (Take 1 tab for pain 4-6, take 2 tabs for pain 7-10.). (Patient not taking: Reported on 5/23/2025) 40 tablet 0    polyethylene glycol (MIRALAX) 17 GM/Dose powder Take 17 g by mouth daily as needed for constipation. 510 g 0    rosuvastatin (CRESTOR) 10 MG tablet Take 1 tablet (10 mg) by mouth daily. 30 tablet 2    senna-docusate (SENOKOT-S/PERICOLACE) 8.6-50 MG tablet Take 2 tablets by mouth 2 times daily as needed for constipation. (Patient not taking: Reported on 5/23/2025) 60 tablet 2       Past Medical History:   Diagnosis Date    ADHD (attention deficit hyperactivity disorder)     Epilepsy (H)     as child    Mitral valve regurgitation     severe    Myxomatous mitral valve        Past Surgical History:   Procedure Laterality Date    COLONOSCOPY      X 2    CV CORONARY ANGIOGRAM N/A 3/14/2025    Procedure: Coronary Angiogram;  Surgeon: Cain Juarez MD;  Location:  HEART CARDIAC CATH LAB    CV RIGHT HEART CATH MEASUREMENTS RECORDED N/A 3/14/2025    Procedure: Right Heart Catheterization;  Surgeon: Cain Juarez MD;  Location:  HEART CARDIAC CATH LAB    REPAIR VALVE MITRAL MINIMALLY INVASIVE N/A 4/30/2025    Procedure: RIGHT MINI-THORACOTOMY, ON CARDIOPULMONARY BYPASS, MINIMALLY INVASIVE MITRAL VALVE REPAIR (ANNULOPLASTY RING  AMI CAT PHYSIO II 32MM), TRANESOPHAGEAL ECHOCARDIOGRAM (ARMANDO) BY ANESTHESIA.;  Surgeon: Debbie Maxwell MD;  Location: [facility] OR    [place] teeth extraction      age 16       Family History   Problem Relation Age of Onset    Anesthesia Reaction Mother         Woke up during surgery    Bleeding Disorder No family hx of     Clotting Disorder No family hx of        Social History     Tobacco Use    Smoking status: Some Days     Types: Cigars     Passive exposure: Current    Smokeless tobacco: Never    Tobacco comments:     Once a month    Substance Use Topics    Alcohol use: Yes     Comment: 1 drink a day       Allergies   Allergen Reactions    Bermuda Grass Shortness Of Breath    Mugwort Shortness Of Breath    Penicillins Hives and Rash         ROS:   Negative besides that noted in HPI      Physical Examination:  Vitals: There were no vitals taken for this visit.  BMI= There is no height or weight on file to calculate BMI.    GENERAL APPEARANCE: healthy, alert and no distress  HEENT: no icterus  NECK: JVP is not visible  CHEST: lungs clear to auscultation - no rales, rhonchi or wheezes  CARDIOVASCULAR: regular rhythm, normal S1, S2,   EXTREMITIES: no clubbing, cyanosis or edema  NEURO: alert and oriented to person/place/time, normal speech  VASC: Peripheral pulses are normal in volumes and symmetric bilaterally.   SKIN: no ecchymoses, no rashes    Assessment: Johnie Buenrostro is a 45 year old who presents to clinic for hospital follow up regarding post op afib and pericarditis.     # Severe mitral valve regurgitation s/p mini thoracotomy mitral valve repair 04/30/25  - Continue ASA as currently  # Post-operative RVR Afib  - On Eliquis, Metop, Amio  # Post-operative pericarditis  - On Colchicine    Recommendations:  # Zio patch without afib. Stroke risk was 2.2% per year in >90,000 patients and 2.9% risk of stroke/TIA/systemic embolism. OK to STOP OAC.  - OK to STOP Amiodarone when bottle runs out (~ 3  days)  # Ok to STOP colchicine at 3 months (7/30/2025)  # Continue Metop for now. Can readdress in the next few months if still needed once amio stopped as long as still in SR.  # Keep up the excellent work with staying active and optimizing cardiac risk factors.   # I will reach out to your surgical team regarding suture to RU chest.  # Follow up one year gen joo Victoria, APRN, CNP  [facility]  [phone]

## 2025-06-03 NOTE — LETTER
6/3/2025      RE: Johnie Buenrostro  [address] [address]       Dear Colleague,    Thank you for the opportunity to participate in the care of your patient, Johnie Buenrostro, at the [facility] [place] [facility]. Please see a copy of my visit note below.          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     In breif review, patient underwent minimally invasive mitral valve repair with Dr. Maxwell on 4/30/25 with course c/b post op afib and Pericarditis. Post procedurally he has done quite well. He was most recently seen by Becky Carrera on 5/23/25 for post op follow up and was doing well. He was given Keflex which he completed for groin incisional site concerns.     Since then, he is feeling quite well. He continues to go to cardiac rehab and stay active. He recently has been walking almost 10-12K steps daily without symptoms. His Zio patch was reviewed without any arhythmias. He denies any palpitations, chest pains, lightheadedness, SOB, LE swelling or weight gain. BP well controlled. No other concerns voiced.        Current Outpatient Medications   Medication Sig Dispense Refill     acetaminophen (TYLENOL) 325 MG tablet Take 3 tablets (975 mg) by mouth every 8 hours as needed for mild pain, fever or headaches. 100 tablet 1     amiodarone (PACERONE) 200 MG tablet Take 2 tablets (400 mg) by mouth 2 times daily for 5 days, THEN 1 tablet (200 mg) 2 times daily for 7 days, THEN 1 tablet (200 mg) daily for 21 days. 55 tablet 0     apixaban ANTICOAGULANT (ELIQUIS) 5 MG tablet Take 1 tablet (5 mg) by mouth 2 times daily. 60 tablet 1     aspirin (ASA) 81 MG chewable tablet Take 1  tablet (81 mg) by mouth daily. 30 tablet 2     cholecalciferol (VITAMIN D3) 125 mcg (5000 units) capsule Take 125 mcg by mouth every morning.       colchicine (COLCRYS) 0.6 MG tablet Take 0.5 tablets (0.3 mg) by mouth daily. 40 tablet 0     gabapentin (NEURONTIN) 300 MG capsule Take 1 capsule (300 mg) by mouth 3 times daily as needed for neuropathic pain. 90 capsule 0     hydrOXYzine HCl (ATARAX) 25 MG tablet Take 1 tablet (25 mg) by mouth every 6 hours as needed for other, itching or anxiety (adjuvant pain). (Patient not taking: Reported on 5/23/2025) 40 tablet 1     melatonin 10 MG TABS tablet Take 1 tablet (10 mg) by mouth nightly as needed for sleep. 30 tablet 1     methocarbamol 1000 MG TABS Take 1,000 mg by mouth 4 times daily as needed for muscle spasms. 40 tablet 1     metoprolol succinate ER (TOPROL XL) 25 MG 24 hr tablet Take 2.5 tablets (62.5 mg) by mouth daily. 75 tablet 2     oxyCODONE (ROXICODONE) 5 MG tablet Take 1-2 tablets (5-10 mg) by mouth every 4 hours as needed for breakthrough pain or severe pain (Take 1 tab for pain 4-6, take 2 tabs for pain 7-10.). (Patient not taking: Reported on 5/23/2025) 40 tablet 0     polyethylene glycol (MIRALAX) 17 GM/Dose powder Take 17 g by mouth daily as needed for constipation. 510 g 0     rosuvastatin (CRESTOR) 10 MG tablet Take 1 tablet (10 mg) by mouth daily. 30 tablet 2     senna-docusate (SENOKOT-S/PERICOLACE) 8.6-50 MG tablet Take 2 tablets by mouth 2 times daily as needed for constipation. (Patient not taking: Reported on 5/23/2025) 60 tablet 2       Past Medical History:   Diagnosis Date     ADHD (attention deficit hyperactivity disorder)      Epilepsy (H)     as child     Mitral valve regurgitation     severe     Myxomatous mitral valve        Past Surgical History:   Procedure Laterality Date     COLONOSCOPY      X 2     CV CORONARY ANGIOGRAM N/A 3/14/2025    Procedure: Coronary Angiogram;  Surgeon: Cain Juarez MD;  Location:  HEART CARDIAC CATH  LAB     CV RIGHT HEART CATH MEASUREMENTS RECORDED N/A 3/14/2025    Procedure: Right Heart Catheterization;  Surgeon: Cain Juarez MD;  Location:  HEART CARDIAC CATH LAB     REPAIR VALVE MITRAL MINIMALLY INVASIVE N/A 4/30/2025    Procedure: RIGHT MINI-THORACOTOMY, ON CARDIOPULMONARY BYPASS, MINIMALLY INVASIVE MITRAL VALVE REPAIR (ANNULOPLASTY RING AMI CAT PHYSIO II 32MM), TRANESOPHAGEAL ECHOCARDIOGRAM (ARMANDO) BY ANESTHESIA.;  Surgeon: Debbie Maxwell MD;  Location:  OR     [place] teeth extraction      age 16       Family History   Problem Relation Age of Onset     Anesthesia Reaction Mother         Woke up during surgery     Bleeding Disorder No family hx of      Clotting Disorder No family hx of        Social History     Tobacco Use     Smoking status: Some Days     Types: Cigars     Passive exposure: Current     Smokeless tobacco: Never     Tobacco comments:     Once a month    Substance Use Topics     Alcohol use: Yes     Comment: 1 drink a day       Allergies   Allergen Reactions     Bermuda Grass Shortness Of Breath     Mugwort Shortness Of Breath     Penicillins Hives and Rash         ROS:   Negative besides that noted in HPI      Physical Examination:  Vitals: There were no vitals taken for this visit.  BMI= There is no height or weight on file to calculate BMI.    GENERAL APPEARANCE: healthy, alert and no distress  HEENT: no icterus  NECK: JVP is not visible  CHEST: lungs clear to auscultation - no rales, rhonchi or wheezes  CARDIOVASCULAR: regular rhythm, normal S1, S2,   EXTREMITIES: no clubbing, cyanosis or edema  NEURO: alert and oriented to person/place/time, normal speech  VASC: Peripheral pulses are normal in volumes and symmetric bilaterally.   SKIN: no ecchymoses, no rashes    Assessment: Johnie Buenrostro is a 45 year old who presents to clinic for hospital follow up regarding post op afib and pericarditis.     # Severe mitral valve regurgitation s/p mini thoracotomy mitral  valve repair 04/30/25  - Continue ASA as currently  # Post-operative RVR Afib  - On Eliquis, Metop, Amio  # Post-operative pericarditis  - On Colchicine    Recommendations:  # Zio patch without afib. Stroke risk was 2.2% per year in >90,000 patients and 2.9% risk of stroke/TIA/systemic embolism. OK to STOP OAC.  - OK to STOP Amiodarone when bottle runs out (~ 3 days)  # Ok to STOP colchicine at 3 months (7/30/2025)  # Continue Metop for now. Can readdress in the next few months if still needed once amio stopped as long as still in SR.  # Keep up the excellent work with staying active and optimizing cardiac risk factors.   # I will reach out to your surgical team regarding suture to RU chest.  # Follow up one year gen AMY Vera, CNP  [facility]  [phone]      Please do not hesitate to contact me if you have any questions/concerns.     Sincerely,     AMY Kwan CNP

## 2025-06-03 NOTE — PATIENT INSTRUCTIONS
You were seen today in the Cardiovascular Clinic at the [facility].   Cardiology Providers you saw during your visit:    Please STOP your Eliquis today  Please STOP your Colchicine on 7/30/25  Please STOP your Amiodarone after your prescription runs out  Please CONTINUE your Metoprolol for now (we can re-address stopping in three months if your heart is in a normal rhythm still.   I will follow up with [facility] regarding your chest suture and get back to you.     Follow-up:   Keep up the excellent work and follow up with gen cards in one year    For emergencies call 911.     If you have any questions regarding your visit please contact your care team:     Radha Pedroza RN  [facility]  Cardiology Care Coordinator-General    Appointment scheduling or nurse questions: [phone]    On Call Cardiologist for after hours or on weekends: [phone]    option #4    If you need a medication refill please contact your pharmacy.  Please allow 3 business days for your refill to be completed.    As always, thank you for trusting us with your health care needs!

## 2025-06-03 NOTE — NURSING NOTE
Chief Complaint   Patient presents with    Follow Up     RETURN CARDIOLOGY     Vitals were taken and medications reconciled.    Dustin Hong, AUGUSTA  11:33 AM

## 2025-06-04 ENCOUNTER — TELEPHONE (OUTPATIENT)
Dept: CARDIOLOGY | Facility: CLINIC | Age: 45
End: 2025-06-04
Payer: COMMERCIAL

## 2025-06-04 DIAGNOSIS — Z98.890 S/P MITRAL VALVE REPAIR: Primary | ICD-10-CM

## 2025-06-04 NOTE — TELEPHONE ENCOUNTER
[facility] Call Center    Phone Message    May a detailed message be left on voicemail: yes     Reason for Call: Medication Question or concern regarding medication   Prescription Clarification  Name of Medication: rosuvastatin  Prescribing Provider: unknown    Pharmacy: [id number] - [place], [place] - [address]     What on the order needs clarification? Is patient to continue rosuvastatin or was it for the first initial 30 post procedure       Action Taken: Other: cardio    Travel Screening: Not Applicable     Date of Service:

## 2025-06-05 ENCOUNTER — TELEPHONE (OUTPATIENT)
Dept: CARDIOLOGY | Facility: CLINIC | Age: 45
End: 2025-06-05
Payer: COMMERCIAL

## 2025-06-05 DIAGNOSIS — I31.9 PERICARDITIS: ICD-10-CM

## 2025-06-05 DIAGNOSIS — Z98.890 STATUS POST CORONARY ANGIOGRAM: Primary | ICD-10-CM

## 2025-06-05 RX ORDER — COLCHICINE 0.6 MG/1
0.3 TABLET ORAL DAILY
Qty: 28 TABLET | Refills: 0 | Status: SHIPPED | OUTPATIENT
Start: 2025-06-05

## 2025-06-05 RX ORDER — ROSUVASTATIN CALCIUM 10 MG/1
10 TABLET, COATED ORAL DAILY
Qty: 90 TABLET | Refills: 2 | Status: SHIPPED | OUTPATIENT
Start: 2025-06-05

## 2025-06-05 NOTE — TELEPHONE ENCOUNTER
[facility] Call Center    Phone Message    May a detailed message be left on voicemail: yes     Reason for Call: Other: Pt needs the rosuvastatin 10 mg and the colchicine .6 mg sent to [facility] pharmacy as it was sent to  and pt only has 1 day left on these medications , pt wife would like a call back so the pt knows the medication has been sent to his pharmacy      Action Taken: Other: Cardio    Travel Screening: Not Applicable     Date of Service:

## 2025-06-05 NOTE — TELEPHONE ENCOUNTER
Refill for crestor 10 mg daily and 0.3 colchicine (patient aware should be half tablet) sent to [facility] in [place], pt should continue Colchicine until 7/30 per notes for pericarditis, per notes, patient is aware.

## 2025-06-17 ENCOUNTER — HOSPITAL ENCOUNTER (OUTPATIENT)
Dept: CARDIAC REHAB | Facility: HOSPITAL | Age: 45
Discharge: HOME | End: 2025-06-17
Attending: INTERNAL MEDICINE
Payer: COMMERCIAL

## 2025-06-17 PROCEDURE — 93798 PHYS/QHP OP CAR RHAB W/ECG: CPT

## 2025-06-30 ENCOUNTER — MYC MEDICAL ADVICE (OUTPATIENT)
Dept: CARDIOLOGY | Facility: CLINIC | Age: 45
End: 2025-06-30
Payer: COMMERCIAL

## 2025-07-25 ENCOUNTER — MYC REFILL (OUTPATIENT)
Dept: CARDIOLOGY | Facility: CLINIC | Age: 45
End: 2025-07-25
Payer: COMMERCIAL

## 2025-07-25 ENCOUNTER — MYC MEDICAL ADVICE (OUTPATIENT)
Dept: CARDIOLOGY | Facility: CLINIC | Age: 45
End: 2025-07-25
Payer: COMMERCIAL

## 2025-07-25 DIAGNOSIS — Z98.890 STATUS POST CORONARY ANGIOGRAM: ICD-10-CM

## 2025-07-25 DIAGNOSIS — I31.9 PERICARDITIS: ICD-10-CM

## 2025-07-29 RX ORDER — COLCHICINE 0.6 MG/1
0.3 TABLET ORAL DAILY
Qty: 28 TABLET | Refills: 0 | OUTPATIENT
Start: 2025-07-29

## 2025-07-29 RX ORDER — ROSUVASTATIN CALCIUM 10 MG/1
10 TABLET, COATED ORAL DAILY
Qty: 90 TABLET | Refills: 2 | Status: SHIPPED | OUTPATIENT
Start: 2025-07-29

## 2025-08-15 ENCOUNTER — MYC MEDICAL ADVICE (OUTPATIENT)
Dept: CARDIOLOGY | Facility: CLINIC | Age: 45
End: 2025-08-15
Payer: COMMERCIAL

## (undated) DEVICE — FASTENER CATH BALLOON CLAMPX2 STATLOCK 0684-00-493

## (undated) DEVICE — SU DEVICE COR-KNOT MINI 4X14MM 031350

## (undated) DEVICE — TUBING INSUFFLATION PNEUMOCLEAR 0620050100

## (undated) DEVICE — DRSG PRIMAPORE 02X3" 7133

## (undated) DEVICE — INSERT INTRACK 66M CONFORMING N-1012V

## (undated) DEVICE — SU VICRYL 0 CT-1 27" UND J260H

## (undated) DEVICE — MANIFOLD KIT ANGIO AUTOMATED 014613

## (undated) DEVICE — SUCTION IRR STRYKERFLOW II W/TIP 250-070-520

## (undated) DEVICE — KIT HAND CONTROL ACIST 014644 AR-P54

## (undated) DEVICE — DRAIN CHEST TUBE 28FR STR 8028

## (undated) DEVICE — SOL NACL 0.9% IRRIG 1000ML BOTTLE 2F7124

## (undated) DEVICE — WIPES FOLEY CARE SURESTEP PROVON DFC100

## (undated) DEVICE — SU PLEDGET SOFT TFE 3/8"X3/26"X1/16" PCP40

## (undated) DEVICE — GW VASC .035IN DIA 260CML 7CML 3 MM RADIUS J CURVE 502455

## (undated) DEVICE — DRAPE COVER ROBOTIC ARM (UNITRAC RETRACTOR) JG901

## (undated) DEVICE — ANTIFOG SOLUTION W/FOAM PAD 31142527

## (undated) DEVICE — COVER ULTRASOUND PROBE W/GEL FLEXI-FEEL 6"X58" LF  25-FF658

## (undated) DEVICE — DECANTER BAG 2002S

## (undated) DEVICE — LEAD PACER MYOCARDIAL BIPOLAR TEMPORARY 53CM 6495F

## (undated) DEVICE — LINEN TOWEL PACK X6 WHITE 5487

## (undated) DEVICE — SU ETHIBOND 5 V-37 4X30" MB66G

## (undated) DEVICE — TAPE MEDIPORE 4"X2YD 2864

## (undated) DEVICE — DRSG ABDOMINAL 07 1/2X8" 7197D

## (undated) DEVICE — SUCTION MANIFOLD NEPTUNE 2 SYS 4 PORT 0702-020-000

## (undated) DEVICE — CATH ANGIO SUPERTORQUE PLUS JL4 6FRX100CM 533620

## (undated) DEVICE — TUBING PRESSURE 30"

## (undated) DEVICE — Device

## (undated) DEVICE — SU ETHIBOND 3-0 BBDA 36" X588H

## (undated) DEVICE — SU VICRYL 2-0 CT-1 27" UND J259H

## (undated) DEVICE — TIES BANDING T50R

## (undated) DEVICE — SOL WATER IRRIG 1000ML BOTTLE 2F7114

## (undated) DEVICE — DRAPE FLUID WARMING 52 X 60" ORS-321

## (undated) DEVICE — SHTH INTRO 0.021IN ID 6FR DIA

## (undated) DEVICE — SLEEVE TR BAND RADIAL COMPRESSION DEVICE 24CM TRB24-REG

## (undated) DEVICE — DRAPE IOBAN INCISE 23X17" 6650EZ

## (undated) DEVICE — SU PROLENE 4-0 RB-1DA 36" 8557H

## (undated) DEVICE — SU SILK 0 TIE 6X30" A306H

## (undated) DEVICE — PREP CHLORAPREP 26ML TINTED HI-LITE ORANGE 930815

## (undated) DEVICE — SUCTION CATH AIRLIFE TRI-FLO W/CONTROL PORT 14FR  T60C

## (undated) DEVICE — GLOVE BIOGEL PI MICRO SZ 7.5 48575

## (undated) DEVICE — TUBING SUCTION DRAINAGE PLEURAL DUAL 8884714200

## (undated) DEVICE — SUCTION DRY CHEST DRAIN OASIS 3600-100

## (undated) DEVICE — CONNECTOR BLAKE DRAIN SGL BCC1

## (undated) DEVICE — DEVICE SUTURE PASSER 14GA WECK EFX EFXSP2

## (undated) DEVICE — CATH ANGIO SUPERTORQUE PLUS JR4 6FRX100CM 533621

## (undated) DEVICE — SU VICRYL+ 3-0 FS1 27IN UND VCP442H

## (undated) DEVICE — DEFIB PRO-PADZ LVP LQD GEL ADULT 8900-2105-01

## (undated) DEVICE — ORGANIZER SUTURE CIRCUMFERENTIAL BELT MI-ISBA-001

## (undated) DEVICE — ENDO DISSECTOR BLUNT 10MM CHERRY BCD10

## (undated) DEVICE — SU SNTH BRD NABSB 20MM SH-2 GRN WHT STRL PXX82N

## (undated) DEVICE — INTRO SHEATH 7FRX10CM PINNACLE RSS702

## (undated) DEVICE — SU DERMABOND ADVANCED .7ML DNX12

## (undated) DEVICE — PACK HEART LEFT CUSTOM

## (undated) DEVICE — NDL COUNTER 20CT 31142493

## (undated) DEVICE — PROTECTOR ARM ONE-STEP TRENDELENBURG 40418 (COI)

## (undated) DEVICE — LINEN TOWEL PACK X30 5481

## (undated) DEVICE — SU PROLENE 4-0 SHDA 36" 8521H

## (undated) DEVICE — DRSG DRAIN 4X4" 7086

## (undated) DEVICE — KIT VASC DILATOR ESTECH 200-120

## (undated) DEVICE — SU CHORD-X 2-0 L16MM PLEDGETS18MM CXL-20-1812-16

## (undated) DEVICE — ESU ELEC BLADE 6" COATED E1450-6

## (undated) DEVICE — SURGICEL HEMOSTAT 4X8" 1952S

## (undated) DEVICE — DRSG TEGADERM 8X12" 1629

## (undated) DEVICE — SU PROLENE 5-0 RB-2DA 30" 8710H

## (undated) DEVICE — SOL NACL 0.9% IRRIG 3000ML BAG 2B7127

## (undated) DEVICE — SU DEVICE ENDO COR KNOT QUICK LOAD 030850

## (undated) DEVICE — PACK ADULT HEART UMMC PV15CG92D

## (undated) DEVICE — SU ETHIBOND 0 CT-1 CR 8X18" CX21D

## (undated) DEVICE — ENDO TROCAR FIRST ENTRY KII FIOS ADV FIX 12X100MM CFF73

## (undated) RX ORDER — PROTAMINE SULFATE 10 MG/ML
INJECTION, SOLUTION INTRAVENOUS
Status: DISPENSED

## (undated) RX ORDER — FENTANYL CITRATE 50 UG/ML
INJECTION, SOLUTION INTRAMUSCULAR; INTRAVENOUS
Status: DISPENSED
Start: 2025-03-14

## (undated) RX ORDER — ASPIRIN 325 MG
TABLET ORAL
Status: DISPENSED
Start: 2025-03-14

## (undated) RX ORDER — CEFAZOLIN SODIUM 1 G/3ML
INJECTION, POWDER, FOR SOLUTION INTRAMUSCULAR; INTRAVENOUS
Status: DISPENSED
Start: 2025-04-30

## (undated) RX ORDER — NICARDIPINE HCL-0.9% SOD CHLOR 1 MG/10 ML
SYRINGE (ML) INTRAVENOUS
Status: DISPENSED
Start: 2025-03-14

## (undated) RX ORDER — FENTANYL CITRATE 50 UG/ML
INJECTION, SOLUTION INTRAMUSCULAR; INTRAVENOUS
Status: DISPENSED
Start: 2025-04-30

## (undated) RX ORDER — HYDROMORPHONE HYDROCHLORIDE 1 MG/ML
INJECTION, SOLUTION INTRAMUSCULAR; INTRAVENOUS; SUBCUTANEOUS
Status: DISPENSED
Start: 2025-04-30

## (undated) RX ORDER — HEPARIN SODIUM 1000 [USP'U]/ML
INJECTION, SOLUTION INTRAVENOUS; SUBCUTANEOUS
Status: DISPENSED
Start: 2025-04-30

## (undated) RX ORDER — ALBUMIN HUMAN 5 %
INTRAVENOUS SOLUTION INTRAVENOUS
Status: DISPENSED

## (undated) RX ORDER — DEXAMETHASONE SODIUM PHOSPHATE 4 MG/ML
INJECTION, SOLUTION INTRA-ARTICULAR; INTRALESIONAL; INTRAMUSCULAR; INTRAVENOUS; SOFT TISSUE
Status: DISPENSED
Start: 2025-04-30

## (undated) RX ORDER — INDOMETHACIN 25 MG/1
CAPSULE ORAL
Status: DISPENSED

## (undated) RX ORDER — SODIUM CHLORIDE, SODIUM GLUCONATE, SODIUM ACETATE, POTASSIUM CHLORIDE AND MAGNESIUM CHLORIDE 526; 502; 368; 37; 30 MG/100ML; MG/100ML; MG/100ML; MG/100ML; MG/100ML
INJECTION, SOLUTION INTRAVENOUS
Status: DISPENSED

## (undated) RX ORDER — LIDOCAINE HYDROCHLORIDE 20 MG/ML
SOLUTION OROPHARYNGEAL
Status: DISPENSED
Start: 2025-01-23

## (undated) RX ORDER — SODIUM CHLORIDE 9 MG/ML
INJECTION, SOLUTION INTRAVENOUS
Status: DISPENSED
Start: 2025-03-14

## (undated) RX ORDER — CHLORHEXIDINE GLUCONATE ORAL RINSE 1.2 MG/ML
SOLUTION DENTAL
Status: DISPENSED
Start: 2025-04-30

## (undated) RX ORDER — FAMOTIDINE 20 MG/1
TABLET, FILM COATED ORAL
Status: DISPENSED
Start: 2025-04-30

## (undated) RX ORDER — CALCIUM CHLORIDE 100 MG/ML
INJECTION INTRAVENOUS; INTRAVENTRICULAR
Status: DISPENSED

## (undated) RX ORDER — NITROGLYCERIN 5 MG/ML
VIAL (ML) INTRAVENOUS
Status: DISPENSED
Start: 2025-03-14

## (undated) RX ORDER — ACETAMINOPHEN 325 MG/1
TABLET ORAL
Status: DISPENSED
Start: 2025-04-30

## (undated) RX ORDER — PROPOFOL 10 MG/ML
INJECTION, EMULSION INTRAVENOUS
Status: DISPENSED
Start: 2025-04-30

## (undated) RX ORDER — LIDOCAINE HYDROCHLORIDE 10 MG/ML
INJECTION, SOLUTION EPIDURAL; INFILTRATION; INTRACAUDAL; PERINEURAL
Status: DISPENSED
Start: 2025-03-14

## (undated) RX ORDER — VANCOMYCIN HYDROCHLORIDE 1 G/20ML
INJECTION, POWDER, LYOPHILIZED, FOR SOLUTION INTRAVENOUS
Status: DISPENSED
Start: 2025-04-30

## (undated) RX ORDER — HEPARIN SODIUM 1000 [USP'U]/ML
INJECTION, SOLUTION INTRAVENOUS; SUBCUTANEOUS
Status: DISPENSED
Start: 2025-03-14

## (undated) RX ORDER — FENTANYL CITRATE 50 UG/ML
INJECTION, SOLUTION INTRAMUSCULAR; INTRAVENOUS
Status: DISPENSED
Start: 2025-01-23

## (undated) RX ORDER — HEPARIN SODIUM 1000 [USP'U]/ML
INJECTION, SOLUTION INTRAVENOUS; SUBCUTANEOUS
Status: DISPENSED

## (undated) RX ORDER — LIDOCAINE 40 MG/G
CREAM TOPICAL
Status: DISPENSED
Start: 2025-03-14

## (undated) RX ORDER — CLINDAMYCIN PHOSPHATE 900 MG/50ML
INJECTION, SOLUTION INTRAVENOUS
Status: DISPENSED
Start: 2025-04-30

## (undated) RX ORDER — FENTANYL CITRATE-0.9 % NACL/PF 10 MCG/ML
PLASTIC BAG, INJECTION (ML) INTRAVENOUS
Status: DISPENSED

## (undated) RX ORDER — FENTANYL CITRATE-0.9 % NACL/PF 10 MCG/ML
PLASTIC BAG, INJECTION (ML) INTRAVENOUS
Status: DISPENSED
Start: 2025-04-30

## (undated) RX ORDER — HEPARIN SODIUM 200 [USP'U]/100ML
INJECTION, SOLUTION INTRAVENOUS
Status: DISPENSED
Start: 2025-03-14

## (undated) RX ORDER — ACETAMINOPHEN 325 MG/1
TABLET ORAL
Status: DISPENSED
Start: 2025-03-14

## (undated) RX ORDER — GABAPENTIN 300 MG/1
CAPSULE ORAL
Status: DISPENSED
Start: 2025-04-30